# Patient Record
Sex: FEMALE | Race: WHITE | URBAN - METROPOLITAN AREA
[De-identification: names, ages, dates, MRNs, and addresses within clinical notes are randomized per-mention and may not be internally consistent; named-entity substitution may affect disease eponyms.]

---

## 2017-04-19 ENCOUNTER — OUTPATIENT (OUTPATIENT)
Dept: OUTPATIENT SERVICES | Facility: HOSPITAL | Age: 67
LOS: 1 days | Discharge: HOME | End: 2017-04-19

## 2017-06-27 DIAGNOSIS — Z12.31 ENCOUNTER FOR SCREENING MAMMOGRAM FOR MALIGNANT NEOPLASM OF BREAST: ICD-10-CM

## 2017-07-19 ENCOUNTER — OUTPATIENT (OUTPATIENT)
Dept: OUTPATIENT SERVICES | Facility: HOSPITAL | Age: 67
LOS: 1 days | Discharge: HOME | End: 2017-07-19

## 2017-07-19 DIAGNOSIS — R92.2 INCONCLUSIVE MAMMOGRAM: ICD-10-CM

## 2017-07-19 DIAGNOSIS — I48.91 UNSPECIFIED ATRIAL FIBRILLATION: ICD-10-CM

## 2017-07-20 ENCOUNTER — EMERGENCY (EMERGENCY)
Facility: HOSPITAL | Age: 67
LOS: 0 days | Discharge: HOME | End: 2017-07-20

## 2017-07-20 ENCOUNTER — INPATIENT (INPATIENT)
Facility: HOSPITAL | Age: 67
LOS: 0 days | Discharge: HOME | End: 2017-07-21
Attending: INTERNAL MEDICINE

## 2017-07-20 DIAGNOSIS — I48.91 UNSPECIFIED ATRIAL FIBRILLATION: ICD-10-CM

## 2017-07-20 DIAGNOSIS — E87.6 HYPOKALEMIA: ICD-10-CM

## 2017-07-20 DIAGNOSIS — R07.89 OTHER CHEST PAIN: ICD-10-CM

## 2017-07-20 DIAGNOSIS — K21.9 GASTRO-ESOPHAGEAL REFLUX DISEASE WITHOUT ESOPHAGITIS: ICD-10-CM

## 2017-07-20 DIAGNOSIS — I48.92 UNSPECIFIED ATRIAL FLUTTER: ICD-10-CM

## 2017-07-21 DIAGNOSIS — Z02.9 ENCOUNTER FOR ADMINISTRATIVE EXAMINATIONS, UNSPECIFIED: ICD-10-CM

## 2017-07-21 PROBLEM — Z00.00 ENCOUNTER FOR PREVENTIVE HEALTH EXAMINATION: Status: ACTIVE | Noted: 2017-07-21

## 2017-08-21 ENCOUNTER — APPOINTMENT (OUTPATIENT)
Dept: CARDIOLOGY | Facility: CLINIC | Age: 67
End: 2017-08-21

## 2017-09-01 ENCOUNTER — APPOINTMENT (OUTPATIENT)
Dept: CARDIOLOGY | Facility: CLINIC | Age: 67
End: 2017-09-01

## 2018-02-08 ENCOUNTER — APPOINTMENT (OUTPATIENT)
Dept: OBGYN | Facility: CLINIC | Age: 68
End: 2018-02-08
Payer: MEDICARE

## 2018-02-08 PROCEDURE — G0439: CPT

## 2018-02-08 PROCEDURE — G0101: CPT

## 2018-02-14 ENCOUNTER — APPOINTMENT (OUTPATIENT)
Dept: OBGYN | Facility: CLINIC | Age: 68
End: 2018-02-14
Payer: MEDICARE

## 2018-02-14 PROCEDURE — 76830 TRANSVAGINAL US NON-OB: CPT

## 2018-04-25 ENCOUNTER — OUTPATIENT (OUTPATIENT)
Dept: OUTPATIENT SERVICES | Facility: HOSPITAL | Age: 68
LOS: 1 days | Discharge: HOME | End: 2018-04-25

## 2018-04-25 DIAGNOSIS — R92.2 INCONCLUSIVE MAMMOGRAM: ICD-10-CM

## 2018-04-25 DIAGNOSIS — Z12.31 ENCOUNTER FOR SCREENING MAMMOGRAM FOR MALIGNANT NEOPLASM OF BREAST: ICD-10-CM

## 2019-06-24 ENCOUNTER — EMERGENCY (EMERGENCY)
Facility: HOSPITAL | Age: 69
LOS: 0 days | Discharge: HOME | End: 2019-06-25
Admitting: EMERGENCY MEDICINE
Payer: MEDICARE

## 2019-06-24 VITALS
OXYGEN SATURATION: 98 % | SYSTOLIC BLOOD PRESSURE: 122 MMHG | RESPIRATION RATE: 18 BRPM | HEART RATE: 83 BPM | DIASTOLIC BLOOD PRESSURE: 56 MMHG | TEMPERATURE: 97 F
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
## 2019-06-24 DIAGNOSIS — S20.461A INSECT BITE (NONVENOMOUS) OF RIGHT BACK WALL OF THORAX, INITIAL ENCOUNTER: ICD-10-CM
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
## 2019-06-24 DIAGNOSIS — Z88.8 ALLERGY STATUS TO OTHER DRUGS, MEDICAMENTS AND BIOLOGICAL SUBSTANCES STATUS: ICD-10-CM
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
## 2019-06-24 PROCEDURE — 99283 EMERGENCY DEPT VISIT LOW MDM: CPT

## 2019-06-24 NOTE — ED PROVIDER NOTE - NS ED ROS FT
Constitutional: no fever, chills, no recent weight loss, change in appetite or malaise  Cardiac: No chest pain, SOB or edema.  Respiratory: No cough or respiratory distress  GI: No nausea, vomiting, diarrhea or abdominal pain.  : No dysuria, frequency, urgency or hematuria  MS: no pain to back or extremities, no loss of ROM, no weakness  Neuro: No headache or weakness. No LOC.  Skin: + tick bite to back  Except as documented in the HPI, all other systems are negative.

## 2019-06-24 NOTE — ED PROVIDER NOTE - PHYSICAL EXAMINATION
CONSTITUTIONAL: Well-appearing; well-nourished; in no apparent distress.   NECK: Supple; non-tender; no cervical lymphadenopathy.   CARDIOVASCULAR: Normal S1, S2; no murmurs, rubs, or gallops.   RESPIRATORY: Normal chest excursion with respiration; breath sounds clear and equal bilaterally; no wheezes, rhonchi, or rales.  GI/: Normal bowel sounds; non-distended; non-tender; no palpable organomegaly.   MS: No evidence of trauma or deformity. Normal ROM in all four extremities; non-tender to palpation;   SKIN: + small tick noted to mid upper back. No rashes noted  NEURO/PSYCH: A & O x 4; grossly unremarkable. mood and manner are appropriate.

## 2019-06-24 NOTE — ED PROVIDER NOTE - OBJECTIVE STATEMENT
69 year old F c/o tick bote to back. Pt sts she noticed small tick on back today and daughter tried to pull it out but couldn't. STs was outside all weekend. Denies any other symptoms. No rashes, fever/chills, joint pain, fatigue, weakness, chest pain, sob, headache.

## 2019-06-25 RX ADMIN — Medication 200 MILLIGRAM(S): at 00:13

## 2019-06-26 ENCOUNTER — OUTPATIENT (OUTPATIENT)
Dept: OUTPATIENT SERVICES | Facility: HOSPITAL | Age: 69
LOS: 1 days | Discharge: HOME | End: 2019-06-26
Payer: MEDICARE

## 2019-06-26 DIAGNOSIS — R92.2 INCONCLUSIVE MAMMOGRAM: ICD-10-CM

## 2019-06-26 DIAGNOSIS — Z12.31 ENCOUNTER FOR SCREENING MAMMOGRAM FOR MALIGNANT NEOPLASM OF BREAST: ICD-10-CM

## 2019-06-26 PROCEDURE — 77063 BREAST TOMOSYNTHESIS BI: CPT | Mod: 26

## 2019-06-26 PROCEDURE — 76641 ULTRASOUND BREAST COMPLETE: CPT | Mod: 26,50

## 2019-06-26 PROCEDURE — 77067 SCR MAMMO BI INCL CAD: CPT | Mod: 26

## 2020-02-18 ENCOUNTER — APPOINTMENT (OUTPATIENT)
Dept: OBGYN | Facility: CLINIC | Age: 70
End: 2020-02-18
Payer: MEDICARE

## 2020-02-18 PROCEDURE — 76830 TRANSVAGINAL US NON-OB: CPT

## 2020-02-18 PROCEDURE — G0439: CPT

## 2020-02-18 PROCEDURE — G0101: CPT

## 2020-03-22 ENCOUNTER — INPATIENT (INPATIENT)
Facility: HOSPITAL | Age: 70
LOS: 24 days | Discharge: NURSING, REHAB OR LONG-TERM CARE | End: 2020-04-16
Attending: INTERNAL MEDICINE | Admitting: INTERNAL MEDICINE
Payer: MEDICARE

## 2020-03-22 ENCOUNTER — TRANSCRIPTION ENCOUNTER (OUTPATIENT)
Age: 70
End: 2020-03-22

## 2020-03-22 VITALS
WEIGHT: 145.06 LBS | HEART RATE: 83 BPM | SYSTOLIC BLOOD PRESSURE: 88 MMHG | OXYGEN SATURATION: 97 % | RESPIRATION RATE: 22 BRPM | DIASTOLIC BLOOD PRESSURE: 52 MMHG | TEMPERATURE: 98 F

## 2020-03-22 LAB
ALBUMIN SERPL ELPH-MCNC: 3.1 G/DL — LOW (ref 3.5–5.2)
ALP SERPL-CCNC: 45 U/L — SIGNIFICANT CHANGE UP (ref 30–115)
ALT FLD-CCNC: 39 U/L — SIGNIFICANT CHANGE UP (ref 0–41)
ANION GAP SERPL CALC-SCNC: 14 MMOL/L — SIGNIFICANT CHANGE UP (ref 7–14)
ANION GAP SERPL CALC-SCNC: 15 MMOL/L — HIGH (ref 7–14)
APPEARANCE UR: CLEAR — SIGNIFICANT CHANGE UP
APTT BLD: 38.2 SEC — SIGNIFICANT CHANGE UP (ref 27–39.2)
AST SERPL-CCNC: 94 U/L — HIGH (ref 0–41)
BASOPHILS # BLD AUTO: 0.01 K/UL — SIGNIFICANT CHANGE UP (ref 0–0.2)
BASOPHILS NFR BLD AUTO: 0.3 % — SIGNIFICANT CHANGE UP (ref 0–1)
BILIRUB SERPL-MCNC: 0.5 MG/DL — SIGNIFICANT CHANGE UP (ref 0.2–1.2)
BILIRUB UR-MCNC: NEGATIVE — SIGNIFICANT CHANGE UP
BUN SERPL-MCNC: 16 MG/DL — SIGNIFICANT CHANGE UP (ref 10–20)
BUN SERPL-MCNC: 16 MG/DL — SIGNIFICANT CHANGE UP (ref 10–20)
CALCIUM SERPL-MCNC: 7.1 MG/DL — LOW (ref 8.5–10.1)
CALCIUM SERPL-MCNC: 8.1 MG/DL — LOW (ref 8.5–10.1)
CHLORIDE SERPL-SCNC: 106 MMOL/L — SIGNIFICANT CHANGE UP (ref 98–110)
CHLORIDE SERPL-SCNC: 95 MMOL/L — LOW (ref 98–110)
CO2 SERPL-SCNC: 17 MMOL/L — SIGNIFICANT CHANGE UP (ref 17–32)
CO2 SERPL-SCNC: 18 MMOL/L — SIGNIFICANT CHANGE UP (ref 17–32)
COLOR SPEC: SIGNIFICANT CHANGE UP
CREAT SERPL-MCNC: 0.8 MG/DL — SIGNIFICANT CHANGE UP (ref 0.7–1.5)
CREAT SERPL-MCNC: 1 MG/DL — SIGNIFICANT CHANGE UP (ref 0.7–1.5)
D DIMER BLD IA.RAPID-MCNC: 312 NG/ML DDU — HIGH (ref 0–230)
DIFF PNL FLD: NEGATIVE — SIGNIFICANT CHANGE UP
EOSINOPHIL # BLD AUTO: 0 K/UL — SIGNIFICANT CHANGE UP (ref 0–0.7)
EOSINOPHIL NFR BLD AUTO: 0 % — SIGNIFICANT CHANGE UP (ref 0–8)
ERYTHROCYTE [SEDIMENTATION RATE] IN BLOOD: 42 MM/HR — HIGH (ref 0–20)
FIBRINOGEN PPP-MCNC: >700 MG/DL — HIGH (ref 204.4–570.6)
FLU A RESULT: NEGATIVE — SIGNIFICANT CHANGE UP
FLU A RESULT: NEGATIVE — SIGNIFICANT CHANGE UP
FLUAV AG NPH QL: NEGATIVE — SIGNIFICANT CHANGE UP
FLUBV AG NPH QL: NEGATIVE — SIGNIFICANT CHANGE UP
GLUCOSE SERPL-MCNC: 116 MG/DL — HIGH (ref 70–99)
GLUCOSE SERPL-MCNC: 143 MG/DL — HIGH (ref 70–99)
GLUCOSE UR QL: NEGATIVE — SIGNIFICANT CHANGE UP
HCT VFR BLD CALC: 40.4 % — SIGNIFICANT CHANGE UP (ref 37–47)
HGB BLD-MCNC: 14.4 G/DL — SIGNIFICANT CHANGE UP (ref 12–16)
IMM GRANULOCYTES NFR BLD AUTO: 0.3 % — SIGNIFICANT CHANGE UP (ref 0.1–0.3)
INR BLD: 1.15 RATIO — SIGNIFICANT CHANGE UP (ref 0.65–1.3)
KETONES UR-MCNC: NEGATIVE — SIGNIFICANT CHANGE UP
LACTATE SERPL-SCNC: 1.8 MMOL/L — SIGNIFICANT CHANGE UP (ref 0.7–2)
LDH SERPL L TO P-CCNC: 562 — HIGH (ref 50–242)
LEUKOCYTE ESTERASE UR-ACNC: NEGATIVE — SIGNIFICANT CHANGE UP
LYMPHOCYTES # BLD AUTO: 0.38 K/UL — LOW (ref 1.2–3.4)
LYMPHOCYTES # BLD AUTO: 13.1 % — LOW (ref 20.5–51.1)
MCHC RBC-ENTMCNC: 30.4 PG — SIGNIFICANT CHANGE UP (ref 27–31)
MCHC RBC-ENTMCNC: 35.6 G/DL — SIGNIFICANT CHANGE UP (ref 32–37)
MCV RBC AUTO: 85.2 FL — SIGNIFICANT CHANGE UP (ref 81–99)
MONOCYTES # BLD AUTO: 0.29 K/UL — SIGNIFICANT CHANGE UP (ref 0.1–0.6)
MONOCYTES NFR BLD AUTO: 10 % — HIGH (ref 1.7–9.3)
NEUTROPHILS # BLD AUTO: 2.2 K/UL — SIGNIFICANT CHANGE UP (ref 1.4–6.5)
NEUTROPHILS NFR BLD AUTO: 76.3 % — HIGH (ref 42.2–75.2)
NITRITE UR-MCNC: NEGATIVE — SIGNIFICANT CHANGE UP
NRBC # BLD: 0 /100 WBCS — SIGNIFICANT CHANGE UP (ref 0–0)
PH UR: 6.5 — SIGNIFICANT CHANGE UP (ref 5–8)
PLATELET # BLD AUTO: 170 K/UL — SIGNIFICANT CHANGE UP (ref 130–400)
POTASSIUM SERPL-MCNC: 3.7 MMOL/L — SIGNIFICANT CHANGE UP (ref 3.5–5)
POTASSIUM SERPL-MCNC: 6.5 MMOL/L — CRITICAL HIGH (ref 3.5–5)
POTASSIUM SERPL-SCNC: 3.7 MMOL/L — SIGNIFICANT CHANGE UP (ref 3.5–5)
POTASSIUM SERPL-SCNC: 6.5 MMOL/L — CRITICAL HIGH (ref 3.5–5)
PROT SERPL-MCNC: 6.5 G/DL — SIGNIFICANT CHANGE UP (ref 6–8)
PROT UR-MCNC: SIGNIFICANT CHANGE UP
PROTHROM AB SERPL-ACNC: 13.2 SEC — HIGH (ref 9.95–12.87)
RBC # BLD: 4.74 M/UL — SIGNIFICANT CHANGE UP (ref 4.2–5.4)
RBC # FLD: 13.7 % — SIGNIFICANT CHANGE UP (ref 11.5–14.5)
RSV RESULT: NEGATIVE — SIGNIFICANT CHANGE UP
RSV RNA RESP QL NAA+PROBE: NEGATIVE — SIGNIFICANT CHANGE UP
SODIUM SERPL-SCNC: 128 MMOL/L — LOW (ref 135–146)
SODIUM SERPL-SCNC: 137 MMOL/L — SIGNIFICANT CHANGE UP (ref 135–146)
SP GR SPEC: 1.01 — LOW (ref 1.01–1.02)
UROBILINOGEN FLD QL: SIGNIFICANT CHANGE UP
WBC # BLD: 2.89 K/UL — LOW (ref 4.8–10.8)
WBC # FLD AUTO: 2.89 K/UL — LOW (ref 4.8–10.8)

## 2020-03-22 PROCEDURE — 93010 ELECTROCARDIOGRAM REPORT: CPT

## 2020-03-22 PROCEDURE — 99223 1ST HOSP IP/OBS HIGH 75: CPT | Mod: AI,GC

## 2020-03-22 PROCEDURE — 71045 X-RAY EXAM CHEST 1 VIEW: CPT | Mod: 26

## 2020-03-22 PROCEDURE — 99285 EMERGENCY DEPT VISIT HI MDM: CPT

## 2020-03-22 RX ORDER — ACETAMINOPHEN 500 MG
650 TABLET ORAL ONCE
Refills: 0 | Status: DISCONTINUED | OUTPATIENT
Start: 2020-03-22 | End: 2020-03-22

## 2020-03-22 RX ORDER — APIXABAN 2.5 MG/1
1 TABLET, FILM COATED ORAL
Qty: 0 | Refills: 0 | DISCHARGE

## 2020-03-22 RX ORDER — CHLORHEXIDINE GLUCONATE 213 G/1000ML
1 SOLUTION TOPICAL
Refills: 0 | Status: DISCONTINUED | OUTPATIENT
Start: 2020-03-22 | End: 2020-04-05

## 2020-03-22 RX ORDER — METOPROLOL TARTRATE 50 MG
25 TABLET ORAL ONCE
Refills: 0 | Status: COMPLETED | OUTPATIENT
Start: 2020-03-22 | End: 2020-03-22

## 2020-03-22 RX ORDER — APIXABAN 2.5 MG/1
2.5 TABLET, FILM COATED ORAL EVERY 12 HOURS
Refills: 0 | Status: DISCONTINUED | OUTPATIENT
Start: 2020-03-22 | End: 2020-03-24

## 2020-03-22 RX ORDER — METOPROLOL TARTRATE 50 MG
1 TABLET ORAL
Qty: 0 | Refills: 0 | DISCHARGE

## 2020-03-22 RX ORDER — METOPROLOL TARTRATE 50 MG
25 TABLET ORAL
Refills: 0 | Status: DISCONTINUED | OUTPATIENT
Start: 2020-03-22 | End: 2020-03-24

## 2020-03-22 RX ORDER — APIXABAN 2.5 MG/1
2.5 TABLET, FILM COATED ORAL ONCE
Refills: 0 | Status: COMPLETED | OUTPATIENT
Start: 2020-03-22 | End: 2020-03-22

## 2020-03-22 RX ORDER — METOPROLOL TARTRATE 50 MG
50 TABLET ORAL ONCE
Refills: 0 | Status: DISCONTINUED | OUTPATIENT
Start: 2020-03-22 | End: 2020-03-22

## 2020-03-22 RX ORDER — SODIUM CHLORIDE 9 MG/ML
2000 INJECTION, SOLUTION INTRAVENOUS ONCE
Refills: 0 | Status: COMPLETED | OUTPATIENT
Start: 2020-03-22 | End: 2020-03-22

## 2020-03-22 RX ADMIN — Medication 25 MILLIGRAM(S): at 16:29

## 2020-03-22 RX ADMIN — APIXABAN 2.5 MILLIGRAM(S): 2.5 TABLET, FILM COATED ORAL at 17:22

## 2020-03-22 RX ADMIN — SODIUM CHLORIDE 2000 MILLILITER(S): 9 INJECTION, SOLUTION INTRAVENOUS at 13:11

## 2020-03-22 NOTE — ED PROVIDER NOTE - CARE PLAN
Principal Discharge DX:	Sepsis  Secondary Diagnosis:	Viral pneumonia  Secondary Diagnosis:	Atrial fibrillation with rapid ventricular response

## 2020-03-22 NOTE — H&P ADULT - ATTENDING COMMENTS
I saw and evaluated the patient. I have reviewed and agree with the findings and plan of care as documented above in the resident’s note (unless indicated differently below). Any necessary changes were made in the body of the text.    71 yo F pt p/w weakness, anorexia, coughing (mostly non-productive), mild SOB, myalgias, fever (Tmax of 101 per pt), chills and intermittent nausea (but no vomiting). Symptoms began about one week ago. Pt can’t think of any sick contacts and denies any recent travel history. Symptoms progressive since onset w/ no alleviating factors. ROS negative for vomiting, diarrhea, dysuria, arthralgias and skin changes. Says that nothing like this has happened to her before.    ROS:  Constitutional: +fever; +chills; +generalized weakness  Eyes: no conjunctivitis or itching  ENT: no dysphagia or odynophagia  CVS: no LE swelling or PND  Resp: +SOB; +coughing   GI: +nausea; no vomiting; no diarrhea  : no dysuria or hematuria  MSK: +myalgias; no arthralgias  Skin: no skin rashes  All other systems reviewed and are negative    PMHx: A-fib (on metoprolol and apixaban)  SurHx: cholecystectomy  FHx: denies  SocHx: denies tobacco, alcohol and drug use    Exam:  Vitals: BP = 102/64; P = 99; T = 98.2; RR = 16  General: appears stated age; cooperative  Eyes: anicteric sclerae; moist conjunctiva; no lid lag; PERRLA  HENT: AT/NC; clear oropharynx w/ moist mucous membranes  Neck: supple w/ FROM; trachea midline; no thyromegaly  Lungs: lungs w/ crackles over the right middle and lower lung fields; non-labored breathing  CVS: tachycardic w/ irregular rhythm; S1 and S2 w/o MRG  Abd: BS+; soft; non tender to palpation; no masses or HSM  Extremities: LE’s non-edematous; peripheral pulses 2+ b/l  Skin: skin turgor, texture normal; no rashes, nodules or ulcers  Psych: anxious; alert; oriented to person, place, time and situation    Labs: ESR = 42; WBC = 2.89; fibrinogen > 700; d-dimer = 312; PT = 13.2; Ca = 7.1; alb = 3.1  CXR: reticular interstitial opacities: b/l lower and mid lung fields  EKG: A-fib RVR    Assessment:  (1) Weakness, fever, SOB and coughing - concerning for viral PNA (r/o covid-19 infection)  (2) A-fib w/ RVR (uncontrolled likely 2/2 ongoing infection - [id number] of 2 on apixaban)    Plan:  (1) Supportive care: anti-pyretics, anti-emetics, analgesics and anti-tussives to be given PRN  (2) Isolation: airborne/contact/droplet  (3) F/u CRP, CPK, procalcitonin, lipid panel and ferritin; trend CBC and LFT’s (monitor H score)  (4) Hold off on hydroxychloroquine – pt is not hypoxic  (5) Avoid high flow and NIPPV - aerosol generating   (6) Avoid NSAIDs and systemic glucocorticoids  (7) IVF’s – gentle hydration  (8) Advance diet as tolerated  (9) C/w meds as ordered    Code status: full code

## 2020-03-22 NOTE — H&P ADULT - HISTORY OF PRESENT ILLNESS
71 y/o female with pmh of a-fib, c/o fever, non-productive cough, body aches and decreased appetite x 7 days. No known sick contacts. No diarrhea. No ABD pain. No H/A, no neck pain. No dysuria/frequency/urgency. Today with dyspnea at rest. No hemoptysis. 71 y/o female with pmh of a-fib, c/o fever, non-productive cough, body aches and decreased appetite x 7 days. No known sick contacts. No diarrhea. No ABD pain. No H/A, no neck pain. No dysuria/frequency/urgency. Today with dyspnea at rest. No hemoptysis.    # Fever and non productive cough likely viral etiology at this time cannot exclude covid 19  - flu a/b, rsv negative  - covid 19 pending  - wbc low, will send ldh, d dimer, crp, esr, ferritin  - common labs sent  - cxr shows findings bilaterally    # Afib  - metoprolol 25 bid   - apixiban 2.5mg bid    Activity: as tolerated  diet: regular   dvt ppx: heparin 5k bid  gi ppx: not indicated  full code  dispo: from home

## 2020-03-22 NOTE — ED ADULT NURSE NOTE - OBJECTIVE STATEMENT
70 Year old female complaining of weakness, fatigue, and poor appetite. 70 Year old female complaining of weakness, fatigue, and poor appetite. Patient received tylenol 3 tablets 325mg for fever reduction P[place] at [facility] [place].

## 2020-03-22 NOTE — ED PROVIDER NOTE - OBJECTIVE STATEMENT
69 y/o female with hx of a-fib, c/o fever, non-productive cough, body aches and decreased appetite x 7 days. No known sick contacts. No diarrhea. No ABD pain. No H/A, no neck pain. No dysuria/frequency/urgency. Today with dyspnea at rest. No hemoptysis.

## 2020-03-22 NOTE — ED PROVIDER NOTE - PHYSICAL EXAMINATION
The patient was referred by the dermatology office in [place] to remove cyst from the forehead and from the left cheek.    I examined the patient.  The patient has a less than 2 mm sized very superficial epidermal cyst of the forehead.  I do not see any cyst of the left cheek.  The patient has multiple blackheads, multiple whiteheads, has some active acne.    I explained to the patient that I do not recommend removal of that small very superficial epidermal cyst.  Instead, I think the patient should go back to the general dermatology department and potentially have acne treatment instituted.    As the patient was expecting a procedure which is not currently indicated there will be no charge for today's office visit. Jeyson La MD    
O/E: Constitutional: Non-toxic in appearance. Eyes: Dry mucous membranes. No pallor, no jaundice. Neck: Neck supple, no meningeal signs. Respiratory: Lungs with coarse breath sounds b/l. No accessory muscle use. Cardio: S1 S2 tachycardic, irregular, no murmur. ABD: ABD non-distended, soft, no tenderness, no guarding, no rebound. Extremities: No pedal edema, no calf tenderness. Skin: No skin rash. Neuro: No neurologic deficits.   Imp: Sepsis, Ddx includes Covid, pneumonia, flu.  A/P: IV fluids, CXR, labs. will likely require admission.

## 2020-03-22 NOTE — ED ADULT TRIAGE NOTE - CHIEF COMPLAINT QUOTE
BIBA from urgent care for fever, cough, shortness of breath, body aches and diarrhea since Monday   denies sick contacts at home, denies travel  speaking in full sentences in triage

## 2020-03-22 NOTE — ED PROVIDER NOTE - CLINICAL SUMMARY MEDICAL DECISION MAKING FREE TEXT BOX
Labs reviewed. Leukopenia with lymphopenia. CXR with lower lobe opacities consistent with Covid. BP improved after IV fluids. 120/70. Remains tachycardic with a-fib. Will re-start metoprolol PO. Requires admission. Labs reviewed. Leukopenia with lymphopenia. CXR with lower lobe opacities consistent with Covid. BP improved after IV fluids. 120/70. Lactate 1.8. Remains tachycardic with a-fib. Will re-start metoprolol PO. Requires admission.

## 2020-03-23 ENCOUNTER — TRANSCRIPTION ENCOUNTER (OUTPATIENT)
Age: 70
End: 2020-03-23

## 2020-03-23 LAB
ALBUMIN SERPL ELPH-MCNC: 3 G/DL — LOW (ref 3.5–5.2)
ALP SERPL-CCNC: 57 U/L — SIGNIFICANT CHANGE UP (ref 30–115)
ALT FLD-CCNC: 35 U/L — SIGNIFICANT CHANGE UP (ref 0–41)
ANION GAP SERPL CALC-SCNC: 14 MMOL/L — SIGNIFICANT CHANGE UP (ref 7–14)
AST SERPL-CCNC: 51 U/L — HIGH (ref 0–41)
BASOPHILS # BLD AUTO: 0 K/UL — SIGNIFICANT CHANGE UP (ref 0–0.2)
BASOPHILS NFR BLD AUTO: 0 % — SIGNIFICANT CHANGE UP (ref 0–1)
BILIRUB SERPL-MCNC: 0.4 MG/DL — SIGNIFICANT CHANGE UP (ref 0.2–1.2)
BUN SERPL-MCNC: 14 MG/DL — SIGNIFICANT CHANGE UP (ref 10–20)
CALCIUM SERPL-MCNC: 7.7 MG/DL — LOW (ref 8.4–10.5)
CALCIUM SERPL-MCNC: 7.8 MG/DL — LOW (ref 8.5–10.1)
CHLORIDE SERPL-SCNC: 104 MMOL/L — SIGNIFICANT CHANGE UP (ref 98–110)
CO2 SERPL-SCNC: 22 MMOL/L — SIGNIFICANT CHANGE UP (ref 17–32)
CREAT SERPL-MCNC: 0.7 MG/DL — SIGNIFICANT CHANGE UP (ref 0.7–1.5)
CRP SERPL-MCNC: 3.3 MG/DL — HIGH (ref 0–0.4)
EOSINOPHIL # BLD AUTO: 0 K/UL — SIGNIFICANT CHANGE UP (ref 0–0.7)
EOSINOPHIL NFR BLD AUTO: 0 % — SIGNIFICANT CHANGE UP (ref 0–8)
FERRITIN SERPL-MCNC: 762 NG/ML — HIGH (ref 15–150)
GLUCOSE SERPL-MCNC: 100 MG/DL — HIGH (ref 70–99)
HCT VFR BLD CALC: 38.4 % — SIGNIFICANT CHANGE UP (ref 37–47)
HGB BLD-MCNC: 13.1 G/DL — SIGNIFICANT CHANGE UP (ref 12–16)
IMM GRANULOCYTES NFR BLD AUTO: 0.7 % — HIGH (ref 0.1–0.3)
LYMPHOCYTES # BLD AUTO: 0.45 K/UL — LOW (ref 1.2–3.4)
LYMPHOCYTES # BLD AUTO: 16.9 % — LOW (ref 20.5–51.1)
MAGNESIUM SERPL-MCNC: 2 MG/DL — SIGNIFICANT CHANGE UP (ref 1.8–2.4)
MCHC RBC-ENTMCNC: 29.8 PG — SIGNIFICANT CHANGE UP (ref 27–31)
MCHC RBC-ENTMCNC: 34.1 G/DL — SIGNIFICANT CHANGE UP (ref 32–37)
MCV RBC AUTO: 87.5 FL — SIGNIFICANT CHANGE UP (ref 81–99)
MONOCYTES # BLD AUTO: 0.18 K/UL — SIGNIFICANT CHANGE UP (ref 0.1–0.6)
MONOCYTES NFR BLD AUTO: 6.7 % — SIGNIFICANT CHANGE UP (ref 1.7–9.3)
NEUTROPHILS # BLD AUTO: 2.02 K/UL — SIGNIFICANT CHANGE UP (ref 1.4–6.5)
NEUTROPHILS NFR BLD AUTO: 75.7 % — HIGH (ref 42.2–75.2)
NRBC # BLD: 0 /100 WBCS — SIGNIFICANT CHANGE UP (ref 0–0)
PHOSPHATE SERPL-MCNC: 2.9 MG/DL — SIGNIFICANT CHANGE UP (ref 2.1–4.9)
PLATELET # BLD AUTO: 167 K/UL — SIGNIFICANT CHANGE UP (ref 130–400)
POTASSIUM SERPL-MCNC: 3.3 MMOL/L — LOW (ref 3.5–5)
POTASSIUM SERPL-SCNC: 3.3 MMOL/L — LOW (ref 3.5–5)
PROCALCITONIN SERPL-MCNC: 0.09 NG/ML — SIGNIFICANT CHANGE UP (ref 0.02–0.1)
PROT SERPL-MCNC: 5.2 G/DL — LOW (ref 6–8)
PTH-INTACT FLD-MCNC: 38 PG/ML — SIGNIFICANT CHANGE UP (ref 15–65)
RBC # BLD: 4.39 M/UL — SIGNIFICANT CHANGE UP (ref 4.2–5.4)
RBC # FLD: 14.1 % — SIGNIFICANT CHANGE UP (ref 11.5–14.5)
SARS-COV-2 RNA SPEC QL NAA+PROBE: DETECTED
SODIUM SERPL-SCNC: 140 MMOL/L — SIGNIFICANT CHANGE UP (ref 135–146)
WBC # BLD: 2.67 K/UL — LOW (ref 4.8–10.8)
WBC # FLD AUTO: 2.67 K/UL — LOW (ref 4.8–10.8)

## 2020-03-23 PROCEDURE — 99223 1ST HOSP IP/OBS HIGH 75: CPT

## 2020-03-23 PROCEDURE — 71045 X-RAY EXAM CHEST 1 VIEW: CPT | Mod: 26

## 2020-03-23 PROCEDURE — 93010 ELECTROCARDIOGRAM REPORT: CPT

## 2020-03-23 RX ORDER — HYDROXYCHLOROQUINE SULFATE 200 MG
400 TABLET ORAL EVERY 12 HOURS
Refills: 0 | Status: COMPLETED | OUTPATIENT
Start: 2020-03-23 | End: 2020-03-24

## 2020-03-23 RX ORDER — POTASSIUM CHLORIDE 20 MEQ
40 PACKET (EA) ORAL ONCE
Refills: 0 | Status: COMPLETED | OUTPATIENT
Start: 2020-03-23 | End: 2020-03-23

## 2020-03-23 RX ORDER — CEFEPIME 1 G/1
2000 INJECTION, POWDER, FOR SOLUTION INTRAMUSCULAR; INTRAVENOUS ONCE
Refills: 0 | Status: COMPLETED | OUTPATIENT
Start: 2020-03-23 | End: 2020-03-23

## 2020-03-23 RX ORDER — ACETAMINOPHEN 500 MG
2 TABLET ORAL
Qty: 30 | Refills: 0
Start: 2020-03-23

## 2020-03-23 RX ORDER — SODIUM CHLORIDE 9 MG/ML
1000 INJECTION INTRAMUSCULAR; INTRAVENOUS; SUBCUTANEOUS
Refills: 0 | Status: DISCONTINUED | OUTPATIENT
Start: 2020-03-23 | End: 2020-03-23

## 2020-03-23 RX ORDER — HYDROXYCHLOROQUINE SULFATE 200 MG
200 TABLET ORAL EVERY 12 HOURS
Refills: 0 | Status: COMPLETED | OUTPATIENT
Start: 2020-03-24 | End: 2020-03-28

## 2020-03-23 RX ORDER — CEFEPIME 1 G/1
2000 INJECTION, POWDER, FOR SOLUTION INTRAMUSCULAR; INTRAVENOUS ONCE
Refills: 0 | Status: DISCONTINUED | OUTPATIENT
Start: 2020-03-23 | End: 2020-03-23

## 2020-03-23 RX ORDER — ONDANSETRON 8 MG/1
4 TABLET, FILM COATED ORAL ONCE
Refills: 0 | Status: COMPLETED | OUTPATIENT
Start: 2020-03-23 | End: 2020-03-23

## 2020-03-23 RX ORDER — ACETAMINOPHEN 500 MG
650 TABLET ORAL EVERY 6 HOURS
Refills: 0 | Status: DISCONTINUED | OUTPATIENT
Start: 2020-03-23 | End: 2020-03-24

## 2020-03-23 RX ORDER — HYDROXYCHLOROQUINE SULFATE 200 MG
TABLET ORAL
Refills: 0 | Status: COMPLETED | OUTPATIENT
Start: 2020-03-23 | End: 2020-03-28

## 2020-03-23 RX ADMIN — Medication 650 MILLIGRAM(S): at 07:43

## 2020-03-23 RX ADMIN — APIXABAN 2.5 MILLIGRAM(S): 2.5 TABLET, FILM COATED ORAL at 17:55

## 2020-03-23 RX ADMIN — Medication 40 MILLIEQUIVALENT(S): at 17:56

## 2020-03-23 RX ADMIN — Medication 650 MILLIGRAM(S): at 17:55

## 2020-03-23 RX ADMIN — ONDANSETRON 4 MILLIGRAM(S): 8 TABLET, FILM COATED ORAL at 17:55

## 2020-03-23 RX ADMIN — APIXABAN 2.5 MILLIGRAM(S): 2.5 TABLET, FILM COATED ORAL at 05:39

## 2020-03-23 RX ADMIN — CEFEPIME 100 MILLIGRAM(S): 1 INJECTION, POWDER, FOR SOLUTION INTRAMUSCULAR; INTRAVENOUS at 22:24

## 2020-03-23 RX ADMIN — Medication 400 MILLIGRAM(S): at 22:22

## 2020-03-23 RX ADMIN — Medication 25 MILLIGRAM(S): at 05:39

## 2020-03-23 RX ADMIN — Medication 25 MILLIGRAM(S): at 17:55

## 2020-03-23 RX ADMIN — SODIUM CHLORIDE 50 MILLILITER(S): 9 INJECTION INTRAMUSCULAR; INTRAVENOUS; SUBCUTANEOUS at 06:10

## 2020-03-23 RX ADMIN — Medication 650 MILLIGRAM(S): at 06:10

## 2020-03-23 NOTE — DISCHARGE NOTE PROVIDER - HOSPITAL COURSE
69 y/o female with pmh of a-fib, c/o fever, non-productive cough, body aches and decreased appetite x 7 days. No known sick contacts. No diarrhea. No ABD pain. +Dyspnea at rest         # Fever and non productive cough likely viral etiology at this time cannot exclude COVID-19    - Flu A/B/RSV negative, COVID-19 pending     - Lymphopenic and Leukopenic, mild elevation in AST     - CXR showed bilateral reticular infiltrates, read as viral PNA    - Treated with NS at 75mL/hr     - Inflammatory panel noted    - Given instruction to self-quarantine for 14 days or until ED calls patient back with negative result         # Chronic Afib with RVR     - Now rate controlled     - metoprolol 25 bid     - apixaban 2.5mg bid            DVT ppx: Heparin 5000U SubQ twice daily     Activity: as tolerated    Diet: Regular     full code    dispo: from home 69 y/o female with pmh of a-fib, c/o fever, non-productive cough, body aches and decreased appetite x 7 days. No known sick contacts. No diarrhea. No ABD pain. Patient was found to be COVID +. She required intubated on 3/24/2020 for hypoxic respiratory failure. Extubated 4/5.         # Acute hypoxic respiratory failure due to COVID-19     - required intubation on 3/24, extubated on 4/5. Currently on 2L NC satting 97%.    - Completed courses of Unasyn for possible aspiration PNA, Plaquenil, solumedrol, and tocilizumab    - Dispo to [facility], pt requires intensive rehabilitation      - Inflammatory markers have been stable        # Chronic A.fib with high rate A.fib, controlled     - Oral amiodarone and metoprolol 25 BID     - Eliquis 2.5 twice daily         # HTN     - continue metoprolol, Norvasc         Diet: dysphagia diet     DVT prophylaxis: Eliquis     Ambulation: bed to chair 71 y/o female with pmh of a-fib, c/o fever, non-productive cough, body aches and decreased appetite x 7 days. No known sick contacts. No diarrhea. No ABD pain. Patient was found to be COVID +. She required intubated on 3/24/2020 for hypoxic respiratory failure. Extubated 4/5.         # Acute hypoxic respiratory failure due to COVID-19     - required intubation on 3/24, extubated on 4/5. Currently on 2L NC satting 94%. Comfortable, but very anxious     - Completed courses of Unasyn for possible aspiration PNA, Plaquenil, solumedrol, and tocilizumab    - Dispo to [facility], pt requires intensive rehabilitation      - Inflammatory markers have been stable        # Chronic A.fib with high rate A.fib, controlled     - Oral amiodarone and metoprolol 25 BID     - Eliquis 2.5 twice daily         # HTN     - continue metoprolol, Norvasc         # Anxiety    - Xanax 0.25mg twice daily standing         Diet: dysphagia diet     DVT prophylaxis: Eliquis     Ambulation: bed to chair Patient is a 70y old  Female who presents with a chief complaint of covid r/o given fevers, non productive cough (15 Apr 2020 10:08)    69 y/o female with hx of a-fib, presented to the ED on 3/22 with complaints of fever, non-productive cough, body aches and decreased appetite. No known sick contacts. No diarrhea. No ABD pain. No H/A, no neck pain. No dysuria/frequency/urgency.         Patient was found to be COVID +. She required intubation on 3/24/2020 for hypoxic respiratory failure. Extubated 4/5.     Completed courses of Unasyn for possible aspiration PNA, Plaquenil, solumedrol, and tocilizumab.        Patient examined bedside and reports doing okay this morning. She was saturating at 93% on 1 NC. However, upon moving OOB to chair she desaturated to 80%. Oxygen was increased to 5L and patient was clinically stable SPO2 91%. Patient becomes anxious easily regarding her clinical state. Patient is hospital Day 25. Stable on 2.5 L SPO2 90%            # Acute hypoxic respiratory failure due to COVID-19     - required intubation on 3/24, extubated on 4/5. Comfortable, but very anxious     - Completed courses of Unasyn for possible aspiration PNA, Plaquenil, solumedrol, and tocilizumab    - Dispo to [facility], pt requires intensive rehabilitation      - Inflammatory markers have been stable

## 2020-03-23 NOTE — PROGRESS NOTE ADULT - SUBJECTIVE AND OBJECTIVE BOX
LEAH CLIFTON 70y Female      Patient is a 70y old  Female who presents with a chief complaint of covid r/o given fevers, non productive cough (22 Mar 2020 17:47)        INTERVAL HPI/OVERNIGHT EVENTS: No acute events overnight. Patient was seen and evaluated at the bedside. The patient denies pain. 2L NC oxygen, spiked temp to 101.4 overnight. Patient denies fever/chills, chest pain, shortness of breath, abdominal pain, headaches, nausea/vomiting, and diarrhea/constipation.      PHYSICAL EXAM:  GENERAL: NAD  HEAD:  Normocephalic  EYES:  conjunctiva and sclera clear  ENMT: Moist mucous membranes  NECK: Supple  NERVOUS SYSTEM:  Alert, awake  CHEST/LUNG: Good air exchange bilaterally, no wheeze  HEART: Regular rate and rhythm        Vital Signs Last 24 Hrs  T(C): 37.1 (23 Mar 2020 06:48), Max: 38.6 (23 Mar 2020 05:41)  T(F): 98.8 (23 Mar 2020 06:48), Max: 101.4 (23 Mar 2020 05:41)  HR: 86 (23 Mar 2020 05:41) (83 - 161)  BP: 109/54 (23 Mar 2020 05:41) (88/52 - 127/64)  BP(mean): --  RR: 18 (23 Mar 2020 05:41) (16 - 22)  SpO2: 95% (22 Mar 2020 19:01) (93% - 97%)      Consultant(s) Notes Reviewed:  [X] YES  [ ] NO  Care Discussed with Consultants/Other Providers [X] YES  [ ] NO  Imaging Personally Reviewed:  [X] YES  [ ] NO      LABS:                        13.1   2.67  )-----------( 167      ( 23 Mar 2020 08:12 )             38.4     03-23    140  |  104  |  14  ----------------------------<  100<H>  3.3<L>   |  22  |  0.7    Ca    7.8<L>      23 Mar 2020 08:12  Phos  2.9     03-  Mg     2.0     03-23    TPro  5.2<L>  /  Alb  3.0<L>  /  TBili  0.4  /  DBili  x   /  AST  51<H>  /  ALT  35  /  AlkPhos  57  03-23    PT/INR - ( 22 Mar 2020 13:15 )   PT: 13.20 sec;   INR: 1.15 ratio         PTT - ( 22 Mar 2020 13:15 )  PTT:38.2 sec  Urinalysis Basic - ( 22 Mar 2020 17:29 )    Color: Light Yellow / Appearance: Clear / S.007 / pH: x  Gluc: x / Ketone: Negative  / Bili: Negative / Urobili: <2 mg/dL   Blood: x / Protein: Trace / Nitrite: Negative   Leuk Esterase: Negative / RBC: x / WBC x   Sq Epi: x / Non Sq Epi: x / Bacteria: x        CAPILLARY BLOOD GLUCOSE

## 2020-03-23 NOTE — PROGRESS NOTE ADULT - ASSESSMENT
69 y/o female with pmh of a-fib, c/o fever, non-productive cough, body aches and decreased appetite x 7 days. No known sick contacts. No diarrhea. No ABD pain. +Dyspnea at rest     # Fever and non productive cough likely viral etiology at this time cannot exclude COVID-19  - Flu A/B/RSV negative, COVID-19 pending   - Lymphopenic and Leukopenic, mild elevation in AST   - CXR showed bilateral reticular infiltrates, read as viral PNA in proper setting  - May need HCQ, get daily EKGs if on this medication and check baseline EKG QTc (453 ms)  - Continue NS at 75mL/hr     # Chronic Afib with RVR   - Now rate controlled   - metoprolol 25 bid   - apixaban 2.5mg bid      DVT ppx: Heparin 5000U SubQ twice daily   Activity: as tolerated  Diet: Regular   full code  dispo: from home

## 2020-03-23 NOTE — DISCHARGE NOTE PROVIDER - NSDCCPCAREPLAN_GEN_ALL_CORE_FT
PRINCIPAL DISCHARGE DIAGNOSIS  Diagnosis: Sepsis  Assessment and Plan of Treatment: You were admitted due to fever and cough. COVID-19 was suspected, so you were swabbed and your results are PENDING. Please self-quarantine at home for 14 days or until the ER/Department of Health contacts you with further instruction. You may use ice packs and Tylenol for fevers. If your symptoms worsen or persist, please return to the ED. Follow-up with your PCP in 2-3 weeks.      SECONDARY DISCHARGE DIAGNOSES  Diagnosis: Atrial fibrillation with rapid ventricular response  Assessment and Plan of Treatment: Now rate controlled. Continue to take your Lopressor twice daily. See your cardiologist as an outpatient in 2-3 weeks. PRINCIPAL DISCHARGE DIAGNOSIS  Diagnosis: COVID-19  Assessment and Plan of Treatment: You were admitted for acute hypoxic respiratory failure secondary to infection with SARS-CoV-2 causing severe COVID-19. You were intubated and received therapy for this virus including tocilizumab. You recovered and were extubated (tube was removed) and eventually titrated to room air. Please follow-up with your PCP in 2-3 weeks.      SECONDARY DISCHARGE DIAGNOSES  Diagnosis: Atrial fibrillation with rapid ventricular response  Assessment and Plan of Treatment: Now rate controlled. Continue to take your Lopressor twice daily. See your cardiologist as an outpatient in 2-3 weeks.

## 2020-03-23 NOTE — DISCHARGE NOTE PROVIDER - CARE PROVIDER_API CALL
Steven Berry)  Internal Medicine  [address], [address]  [place] [address]  Phone: [phone]  Fax: [phone]  Follow Up Time: 2 weeks

## 2020-03-23 NOTE — DISCHARGE NOTE PROVIDER - NSDCMRMEDTOKEN_GEN_ALL_CORE_FT
acetaminophen 325 mg oral tablet: 2 tab(s) orally every 6 hours, As needed, Temp greater or equal to 38C (100.4F)  apixaban 2.5 mg oral tablet: 1 tab(s) orally 2 times a day  metoprolol tartrate 25 mg oral tablet: 1 tab(s) orally 2 times a day acetaminophen 325 mg oral tablet: 2 tab(s) orally every 6 hours, As needed, Temp greater or equal to 38C (100.4F)  amiodarone 200 mg oral tablet: 0.5 tab(s) orally once a day  amLODIPine 5 mg oral tablet: 1 tab(s) orally once a day  apixaban 2.5 mg oral tablet: 1 tab(s) orally 2 times a day  metoprolol tartrate 25 mg oral tablet: 1 tab(s) orally 2 times a day  polyethylene glycol 3350 oral powder for reconstitution: 17 gram(s) orally once a day  senna oral tablet: 2 tab(s) orally once a day (at bedtime) acetaminophen 325 mg oral tablet: 2 tab(s) orally every 6 hours, As needed, Temp greater or equal to 38C (100.4F)  ALPRAZolam 0.25 mg oral tablet: 1 tab(s) orally 2 times a day  amiodarone 200 mg oral tablet: 0.5 tab(s) orally once a day  amLODIPine 5 mg oral tablet: 1 tab(s) orally once a day  apixaban 2.5 mg oral tablet: 1 tab(s) orally 2 times a day  melatonin 5 mg oral tablet: 1 tab(s) orally once a day (at bedtime)  metoprolol tartrate 25 mg oral tablet: 1 tab(s) orally 2 times a day  polyethylene glycol 3350 oral powder for reconstitution: 17 gram(s) orally once a day  senna oral tablet: 2 tab(s) orally once a day (at bedtime)

## 2020-03-23 NOTE — CHART NOTE - NSCHARTNOTEFT_GEN_A_CORE
patient was desating to 80s on 6L, was put on nonrebreather sat 98%, she was anxious and short of breath. asked the nurse to wean down oxygen if remain stable on non rebreather after 1-2 hours.  started hydroxychloroquine for now pending ID recommendation (),  1 dose of cefepime till ID recommendation , reevaluate tomorrow

## 2020-03-23 NOTE — DISCHARGE NOTE PROVIDER - NSDCFUADDINST_GEN_ALL_CORE_FT
You have been tested for the novel coronavirus (COVID-19).  Your results are PENDING. Upon discharge, you must self-quarantine for 14 days, or until the Department of Health contacts you. Please wear a face mask if you are around other individuals. Try to avoid contact with house members, family, and friends for the duration of this quarantine. Please follow up with your primary care physician within 2-3 weeks of your discharge from [facility]. Please take all medications as prescribed. If you experience any worsening or recurrence of your symptoms, particularly worsening or high fever, shortness of breathe, extreme fatigue, or bloody cough please call [phone] immediately or report to the nearest Emergency Department. If you have any questions or concerns, please do not hesitate to call the hospital at [phone].

## 2020-03-24 LAB
ALBUMIN SERPL ELPH-MCNC: 3 G/DL — LOW (ref 3.5–5.2)
ALP SERPL-CCNC: 78 U/L — SIGNIFICANT CHANGE UP (ref 30–115)
ALT FLD-CCNC: 42 U/L — HIGH (ref 0–41)
ANION GAP SERPL CALC-SCNC: 12 MMOL/L — SIGNIFICANT CHANGE UP (ref 7–14)
APTT BLD: 35.7 SEC — SIGNIFICANT CHANGE UP (ref 27–39.2)
AST SERPL-CCNC: 78 U/L — HIGH (ref 0–41)
BASOPHILS # BLD AUTO: 0 K/UL — SIGNIFICANT CHANGE UP (ref 0–0.2)
BASOPHILS NFR BLD AUTO: 0 % — SIGNIFICANT CHANGE UP (ref 0–1)
BILIRUB SERPL-MCNC: 0.4 MG/DL — SIGNIFICANT CHANGE UP (ref 0.2–1.2)
BUN SERPL-MCNC: 8 MG/DL — LOW (ref 10–20)
CALCIUM SERPL-MCNC: 7.9 MG/DL — LOW (ref 8.5–10.1)
CHLORIDE SERPL-SCNC: 105 MMOL/L — SIGNIFICANT CHANGE UP (ref 98–110)
CK SERPL-CCNC: 146 U/L — SIGNIFICANT CHANGE UP (ref 0–225)
CO2 SERPL-SCNC: 20 MMOL/L — SIGNIFICANT CHANGE UP (ref 17–32)
CREAT SERPL-MCNC: 0.6 MG/DL — LOW (ref 0.7–1.5)
CRP SERPL-MCNC: 5.84 MG/DL — HIGH (ref 0–0.4)
D DIMER BLD IA.RAPID-MCNC: 444 NG/ML DDU — HIGH (ref 0–230)
EOSINOPHIL # BLD AUTO: 0 K/UL — SIGNIFICANT CHANGE UP (ref 0–0.7)
EOSINOPHIL NFR BLD AUTO: 0 % — SIGNIFICANT CHANGE UP (ref 0–8)
FERRITIN SERPL-MCNC: 1047 NG/ML — HIGH (ref 15–150)
GLUCOSE SERPL-MCNC: 110 MG/DL — HIGH (ref 70–99)
HCT VFR BLD CALC: 38.2 % — SIGNIFICANT CHANGE UP (ref 37–47)
HCV AB S/CO SERPL IA: 0.18 S/CO — SIGNIFICANT CHANGE UP (ref 0–0.99)
HCV AB SERPL-IMP: SIGNIFICANT CHANGE UP
HGB BLD-MCNC: 12.3 G/DL — SIGNIFICANT CHANGE UP (ref 12–16)
IMM GRANULOCYTES NFR BLD AUTO: 0.8 % — HIGH (ref 0.1–0.3)
INR BLD: 1.31 RATIO — HIGH (ref 0.65–1.3)
LDH SERPL L TO P-CCNC: 792 — HIGH (ref 50–242)
LYMPHOCYTES # BLD AUTO: 0.36 K/UL — LOW (ref 1.2–3.4)
LYMPHOCYTES # BLD AUTO: 9.4 % — LOW (ref 20.5–51.1)
MAGNESIUM SERPL-MCNC: 2 MG/DL — SIGNIFICANT CHANGE UP (ref 1.8–2.4)
MCHC RBC-ENTMCNC: 28.1 PG — SIGNIFICANT CHANGE UP (ref 27–31)
MCHC RBC-ENTMCNC: 32.2 G/DL — SIGNIFICANT CHANGE UP (ref 32–37)
MCV RBC AUTO: 87.2 FL — SIGNIFICANT CHANGE UP (ref 81–99)
MONOCYTES # BLD AUTO: 0.12 K/UL — SIGNIFICANT CHANGE UP (ref 0.1–0.6)
MONOCYTES NFR BLD AUTO: 3.1 % — SIGNIFICANT CHANGE UP (ref 1.7–9.3)
NEUTROPHILS # BLD AUTO: 3.34 K/UL — SIGNIFICANT CHANGE UP (ref 1.4–6.5)
NEUTROPHILS NFR BLD AUTO: 86.7 % — HIGH (ref 42.2–75.2)
NRBC # BLD: 0 /100 WBCS — SIGNIFICANT CHANGE UP (ref 0–0)
PLATELET # BLD AUTO: 183 K/UL — SIGNIFICANT CHANGE UP (ref 130–400)
POTASSIUM SERPL-MCNC: 4.5 MMOL/L — SIGNIFICANT CHANGE UP (ref 3.5–5)
POTASSIUM SERPL-SCNC: 4.5 MMOL/L — SIGNIFICANT CHANGE UP (ref 3.5–5)
PROCALCITONIN SERPL-MCNC: 0.06 NG/ML — SIGNIFICANT CHANGE UP (ref 0.02–0.1)
PROT SERPL-MCNC: 5.4 G/DL — LOW (ref 6–8)
PROTHROM AB SERPL-ACNC: 15.1 SEC — HIGH (ref 9.95–12.87)
RBC # BLD: 4.38 M/UL — SIGNIFICANT CHANGE UP (ref 4.2–5.4)
RBC # FLD: 14 % — SIGNIFICANT CHANGE UP (ref 11.5–14.5)
SODIUM SERPL-SCNC: 137 MMOL/L — SIGNIFICANT CHANGE UP (ref 135–146)
VIT D25+D1,25 OH+D1,25 PNL SERPL-MCNC: 91 PG/ML — HIGH (ref 19.9–79.3)
WBC # BLD: 3.85 K/UL — LOW (ref 4.8–10.8)
WBC # FLD AUTO: 3.85 K/UL — LOW (ref 4.8–10.8)

## 2020-03-24 PROCEDURE — 71045 X-RAY EXAM CHEST 1 VIEW: CPT | Mod: 26

## 2020-03-24 PROCEDURE — 71045 X-RAY EXAM CHEST 1 VIEW: CPT | Mod: 26,77

## 2020-03-24 RX ORDER — FENTANYL CITRATE 50 UG/ML
50 INJECTION INTRAVENOUS ONCE
Refills: 0 | Status: DISCONTINUED | OUTPATIENT
Start: 2020-03-24 | End: 2020-03-24

## 2020-03-24 RX ORDER — TOCILIZUMAB 20 MG/ML
400 INJECTION, SOLUTION, CONCENTRATE INTRAVENOUS ONCE
Refills: 0 | Status: COMPLETED | OUTPATIENT
Start: 2020-03-24 | End: 2020-03-24

## 2020-03-24 RX ORDER — CEFTRIAXONE 500 MG/1
1000 INJECTION, POWDER, FOR SOLUTION INTRAMUSCULAR; INTRAVENOUS ONCE
Refills: 0 | Status: DISCONTINUED | OUTPATIENT
Start: 2020-03-24 | End: 2020-03-24

## 2020-03-24 RX ORDER — AMPICILLIN SODIUM AND SULBACTAM SODIUM 250; 125 MG/ML; MG/ML
3 INJECTION, POWDER, FOR SUSPENSION INTRAMUSCULAR; INTRAVENOUS EVERY 6 HOURS
Refills: 0 | Status: DISCONTINUED | OUTPATIENT
Start: 2020-03-24 | End: 2020-03-28

## 2020-03-24 RX ORDER — ACETAMINOPHEN 500 MG
650 TABLET ORAL EVERY 6 HOURS
Refills: 0 | Status: DISCONTINUED | OUTPATIENT
Start: 2020-03-24 | End: 2020-03-24

## 2020-03-24 RX ORDER — ACETAMINOPHEN 500 MG
650 TABLET ORAL EVERY 6 HOURS
Refills: 0 | Status: DISCONTINUED | OUTPATIENT
Start: 2020-03-24 | End: 2020-03-28

## 2020-03-24 RX ORDER — FENTANYL CITRATE 50 UG/ML
1 INJECTION INTRAVENOUS
Qty: 2500 | Refills: 0 | Status: DISCONTINUED | OUTPATIENT
Start: 2020-03-24 | End: 2020-03-25

## 2020-03-24 RX ORDER — ENOXAPARIN SODIUM 100 MG/ML
65 INJECTION SUBCUTANEOUS EVERY 12 HOURS
Refills: 0 | Status: DISCONTINUED | OUTPATIENT
Start: 2020-03-24 | End: 2020-04-10

## 2020-03-24 RX ORDER — NOREPINEPHRINE BITARTRATE/D5W 8 MG/250ML
0.05 PLASTIC BAG, INJECTION (ML) INTRAVENOUS
Qty: 32 | Refills: 0 | Status: DISCONTINUED | OUTPATIENT
Start: 2020-03-24 | End: 2020-03-24

## 2020-03-24 RX ORDER — PANTOPRAZOLE SODIUM 20 MG/1
40 TABLET, DELAYED RELEASE ORAL DAILY
Refills: 0 | Status: DISCONTINUED | OUTPATIENT
Start: 2020-03-24 | End: 2020-04-16

## 2020-03-24 RX ORDER — NOREPINEPHRINE BITARTRATE/D5W 8 MG/250ML
0.05 PLASTIC BAG, INJECTION (ML) INTRAVENOUS
Qty: 8 | Refills: 0 | Status: DISCONTINUED | OUTPATIENT
Start: 2020-03-24 | End: 2020-03-31

## 2020-03-24 RX ORDER — ONDANSETRON 8 MG/1
4 TABLET, FILM COATED ORAL ONCE
Refills: 0 | Status: COMPLETED | OUTPATIENT
Start: 2020-03-24 | End: 2020-03-24

## 2020-03-24 RX ORDER — CHLORHEXIDINE GLUCONATE 213 G/1000ML
15 SOLUTION TOPICAL
Refills: 0 | Status: DISCONTINUED | OUTPATIENT
Start: 2020-03-24 | End: 2020-04-05

## 2020-03-24 RX ORDER — SODIUM CHLORIDE 9 MG/ML
1000 INJECTION, SOLUTION INTRAVENOUS
Refills: 0 | Status: DISCONTINUED | OUTPATIENT
Start: 2020-03-24 | End: 2020-03-25

## 2020-03-24 RX ORDER — MIDAZOLAM HYDROCHLORIDE 1 MG/ML
0.08 INJECTION, SOLUTION INTRAMUSCULAR; INTRAVENOUS
Qty: 100 | Refills: 0 | Status: DISCONTINUED | OUTPATIENT
Start: 2020-03-24 | End: 2020-03-29

## 2020-03-24 RX ORDER — CEFTRIAXONE 500 MG/1
INJECTION, POWDER, FOR SOLUTION INTRAMUSCULAR; INTRAVENOUS
Refills: 0 | Status: DISCONTINUED | OUTPATIENT
Start: 2020-03-24 | End: 2020-03-24

## 2020-03-24 RX ORDER — MIDAZOLAM HYDROCHLORIDE 1 MG/ML
5 INJECTION, SOLUTION INTRAMUSCULAR; INTRAVENOUS ONCE
Refills: 0 | Status: DISCONTINUED | OUTPATIENT
Start: 2020-03-24 | End: 2020-03-24

## 2020-03-24 RX ORDER — SODIUM CHLORIDE 9 MG/ML
1000 INJECTION, SOLUTION INTRAVENOUS
Refills: 0 | Status: DISCONTINUED | OUTPATIENT
Start: 2020-03-24 | End: 2020-03-24

## 2020-03-24 RX ADMIN — AMPICILLIN SODIUM AND SULBACTAM SODIUM 200 GRAM(S): 250; 125 INJECTION, POWDER, FOR SUSPENSION INTRAMUSCULAR; INTRAVENOUS at 17:41

## 2020-03-24 RX ADMIN — Medication 650 MILLIGRAM(S): at 14:13

## 2020-03-24 RX ADMIN — MIDAZOLAM HYDROCHLORIDE 5 MILLIGRAM(S): 1 INJECTION, SOLUTION INTRAMUSCULAR; INTRAVENOUS at 08:42

## 2020-03-24 RX ADMIN — ONDANSETRON 4 MILLIGRAM(S): 8 TABLET, FILM COATED ORAL at 04:17

## 2020-03-24 RX ADMIN — FENTANYL CITRATE 3.29 MICROGRAM(S)/KG/HR: 50 INJECTION INTRAVENOUS at 07:01

## 2020-03-24 RX ADMIN — Medication 25 MILLIGRAM(S): at 05:52

## 2020-03-24 RX ADMIN — AMPICILLIN SODIUM AND SULBACTAM SODIUM 200 GRAM(S): 250; 125 INJECTION, POWDER, FOR SUSPENSION INTRAMUSCULAR; INTRAVENOUS at 21:06

## 2020-03-24 RX ADMIN — CHLORHEXIDINE GLUCONATE 15 MILLILITER(S): 213 SOLUTION TOPICAL at 17:13

## 2020-03-24 RX ADMIN — Medication 650 MILLIGRAM(S): at 20:27

## 2020-03-24 RX ADMIN — Medication 650 MILLIGRAM(S): at 21:07

## 2020-03-24 RX ADMIN — Medication 200 MILLIGRAM(S): at 17:12

## 2020-03-24 RX ADMIN — MIDAZOLAM HYDROCHLORIDE 1.32 MG/KG/HR: 1 INJECTION, SOLUTION INTRAMUSCULAR; INTRAVENOUS at 07:01

## 2020-03-24 RX ADMIN — PANTOPRAZOLE SODIUM 40 MILLIGRAM(S): 20 TABLET, DELAYED RELEASE ORAL at 17:13

## 2020-03-24 RX ADMIN — TOCILIZUMAB 100 MILLIGRAM(S): 20 INJECTION, SOLUTION, CONCENTRATE INTRAVENOUS at 17:42

## 2020-03-24 RX ADMIN — APIXABAN 2.5 MILLIGRAM(S): 2.5 TABLET, FILM COATED ORAL at 05:52

## 2020-03-24 RX ADMIN — Medication 400 MILLIGRAM(S): at 05:52

## 2020-03-24 RX ADMIN — ENOXAPARIN SODIUM 65 MILLIGRAM(S): 100 INJECTION SUBCUTANEOUS at 17:11

## 2020-03-24 NOTE — CONSULT NOTE ADULT - SUBJECTIVE AND OBJECTIVE BOX
Patient is a 70y old  Female who presents with a chief complaint of covid r/o given fevers, non productive cough (23 Mar 2020 13:16)      HPI:  69 y/o female with pmh of a-fib on Eliquis , c/o fever, non-productive cough, body aches and decreased appetite x 7 days. No known sick contacts. No diarrhea. No ABD pain. No H/A, no neck pain. No dysuria/frequency/urgency. Presented with sob  No hemoptysis.  Patient admitted to internal medicine service for acute hypoxemic respiratory failure 2/2 covid19 . MICU has been consulted for worsening hypoxia .Patient intubated in CEU       PAST MEDICAL & SURGICAL HISTORY:  Afib      SOCIAL HX:   Smoking         denies                ETOH    denies                        Other    FAMILY HISTORY:  :  No known cardiovacular family hisotry     Review Of Systems:     CONSTITUTIONAL:   + fever   no chills.  no weight gain   no weight loss    EYES:   no discharge,   no pain  no redness,   no visual changes.    ENT:   Ears: no ear pain and no hearing problems.  Nose: no nasal congestion and no nasal drainage.  Mouth/Throat: no dysphagia,  no hoarseness and no throat pain.  Neck: no lumps, no pain, no stiffness and no swollen glands.     CARDIOVASCULAR:   as per HPI    RESPIRATORY:  as per HPI     GASTROINTESTINAL:   no abdominal pain,   no constipation,   no diarrhea,   no vomiting.    GENITOURINARY:  no dysuria,   no frequency,   no urgency  no hematuria.    SKIN:   no jaundice,   no lesions,   no pruritis,   no rashes.    NEURO:   as per HPI    PSYCHIATRIC:   no known mental health issues  no anxiety  no depression    ALLERGIC/IMMUNOLOGIC:   No active allergic or immunologic issues      Allergies    Originally Entered as [Unknown] reaction to [green pepper] (Unknown)  Originally Entered as [Unknown] reaction to [pepper] (Unknown)  sulfa drugs (Unknown)    Intolerances          PHYSICAL EXAM    ICU Vital Signs Last 24 Hrs  T(C): 39.2 (24 Mar 2020 12:00), Max: 39.2 (24 Mar 2020 12:00)  T(F): 102.6 (24 Mar 2020 12:00), Max: 102.6 (24 Mar 2020 12:00)  HR: 80 (24 Mar 2020 13:00) (72 - 88)  BP: 83/42 (24 Mar 2020 13:00) (83/42 - 123/56)  BP(mean): 54 (24 Mar 2020 13:00) (54 - 61)  ABP: --  ABP(mean): --  RR: 40 (24 Mar 2020 13:00) (14 - 40)  SpO2: 100% (24 Mar 2020 13:00) (84% - 100%)      CONSTITUTIONAL:   Well nourished.  NAD    ENT:   + ET   Airway patent,   Mouth with normal mucosa.   No thrush    EYES:   pupils equal,   round and reactive to light.    CARDIAC:   Normal rate,   irregular rhythm.    Heart sounds S1, S2.   No edema      Vascular:   normal systolic impulse  no bruits    RESPIRATORY:   No wheezing   b/l crackles+  Normal chest expansion  No use of accessory muscles    GASTROINTESTINAL:  Abdomen soft   Non-tender,   No guarding,   + BS    GENITOURINARY  normal genitalia for sex  no edema    MUSCULOSKELETAL:   Range of motion is not limited,  no clubbing, cyanosis    NEUROLOGICAL:   sedated   Non focal      SKIN:   Skin normal color for race,   Warm and dry  No evidence of rash.    PSYCHIATRIC:   Normal mood and affect.   No apparent risk to self or others.    HEME LYMPH:   No cervical  lymphadenopathy.  No inguinal lymphadenopathy            20 @ 07:01  -  20 @ 13:33  --------------------------------------------------------  IN:    fentaNYL Infusion.: 4 mL    midazolam Infusion: 4 mL  Total IN: 8 mL    OUT:  Total OUT: 0 mL    Total NET: 8 mL          LABS:                          12.3   3.85  )-----------( 183      ( 24 Mar 2020 07:47 )             38.2                                                              12.3   3.85  )-----------( 183      (  @ 07:47 )             38.2                13.1   2.67  )-----------( 167      (  @ 08:12 )             38.4                14.4   2.89  )-----------( 170      (  @ 13:15 )             40.4         137  |  105  |  8<L>  ----------------------------<  110<H>  4.5   |  20  |  0.6<L>    24 Mar 2020 07:47    137    |  105    |  8<L>   ----------------------------<  110<H>  4.5     |  20     |  0.6<L>  23 Mar 2020 08:12    140    |  104    |  14     ----------------------------<  100<H>  3.3<L>   |  22     |  0.7      Ca    7.9<L>      24 Mar 2020 07:47  Ca    7.8<L>      23 Mar 2020 08:12  Phos  2.9       23 Mar 2020 08:12  Mg     2.0       24 Mar 2020 07:47  Mg     2.0       23 Mar 2020 08:12    TPro  5.4<L>  /  Alb  3.0<L>  /  TBili  0.4    /  DBili  x      /  AST  78<H>  /  ALT  42<H>  /  AlkPhos  78     24 Mar 2020 07:47  TPro  5.2<L>  /  Alb  3.0<L>  /  TBili  0.4    /  DBili  x      /  AST  51<H>  /  ALT  35     /  AlkPhos  57     23 Mar 2020 08:12    Ca    7.9<L>      24 Mar 2020 07:47  Phos  2.9     03-23  Mg     2.0     03-24    TPro  5.4<L>  /  Alb  3.0<L>  /  TBili  0.4  /  DBili  x   /  AST  78<H>  /  ALT  42<H>  /  AlkPhos  78  03-24      PT/INR - ( 24 Mar 2020 07:47 )   PT: 15.10 sec;   INR: 1.31 ratio         PTT - ( 24 Mar 2020 07:47 )  PTT:35.7 sec                                       Urinalysis Basic - ( 22 Mar 2020 17:29 )    Color: Light Yellow / Appearance: Clear / S.007 / pH: x  Gluc: x / Ketone: Negative  / Bili: Negative / Urobili: <2 mg/dL   Blood: x / Protein: Trace / Nitrite: Negative   Leuk Esterase: Negative / RBC: x / WBC x   Sq Epi: x / Non Sq Epi: x / Bacteria: x        CARDIAC MARKERS ( 24 Mar 2020 07:47 )  x     / x     / 146 U/L / x     / x                                                LIVER FUNCTIONS - ( 24 Mar 2020 07:47 )  Alb: 3.0 g/dL / Pro: 5.4 g/dL / ALK PHOS: 78 U/L / ALT: 42 U/L / AST: 78 U/L / GGT: x                                                  Culture - Blood (collected 22 Mar 2020 13:15)  Source: .Blood Blood-Peripheral  Preliminary Report (23 Mar 2020 20:01):    No growth to date.    Culture - Blood (collected 22 Mar 2020 13:15)  Source: .Blood Blood-Peripheral  Preliminary Report (23 Mar 2020 20:01):    No growth to date.                                                   Mode: AC/ CMV (Assist Control/ Continuous Mandatory Ventilation)  RR (machine): 20  TV (machine): 400  FiO2: 100  PEEP: 10  MAP: 14  PIP: 30                                      ABG - ( 24 Mar 2020 11:18 )  pH, Arterial: 7.41  pH, Blood: x     /  pCO2: 37    /  pO2: 146   / HCO3: 23    / Base Excess: -1.2  /  SaO2: 99                  X-Rays reviewed:        < from: Xray Chest 1 View- PORTABLE-Urgent (20 @ 11:07) >  Stable focal right upper lung opacity and bilateral lower lobe predominant interstitial and airspace opacities. No pneumothorax.ET tube high     < end of copied text >                                                                              ECHO not done      MEDICATIONS  (STANDING):  apixaban 2.5 milliGRAM(s) Oral every 12 hours  chlorhexidine 0.12% Liquid 15 milliLiter(s) Oral Mucosa two times a day  chlorhexidine 4% Liquid 1 Application(s) Topical <User Schedule>  fentaNYL   Infusion. 1 MICROgram(s)/kG/Hr (6.58 mL/Hr) IV Continuous <Continuous>  hydroxychloroquine 200 milliGRAM(s) Oral every 12 hours  hydroxychloroquine   Oral   metoprolol tartrate 25 milliGRAM(s) Oral two times a day  midazolam Infusion 0.08 mG/kG/Hr (5.26 mL/Hr) IV Continuous <Continuous>  pantoprazole   Suspension 40 milliGRAM(s) Oral daily    MEDICATIONS  (PRN):  acetaminophen   Tablet .. 650 milliGRAM(s) Oral every 6 hours PRN Temp greater or equal to 38C (100.4F)  acetaminophen  Suppository .. 650 milliGRAM(s) Rectal every 6 hours PRN Temp greater or equal to 38C (100.4F)

## 2020-03-24 NOTE — PROCEDURE NOTE - ADDITIONAL PROCEDURE DETAILS
Patient:   ZOLTAN HOWARD            MRN: [id number]            FIN: [id number]               Age:   89 years     Sex:  FEMALE     :  19   Associated Diagnoses:   None   Author:   AMBER PEREZ      Chief complaint:  Hypotension, minimally responsive      History of present illness:  Patient is a 89-year-old female who presents to ED via EMS for evaluation of hypotension.  Patient seen and evaluated this evening in ED.  Caregiver, Mahi, at bedside.  Patient drowsy and minimally responsive, also with history of advanced dementia.  Unable to provide any meaningful history.  EMS and ED documentation reviewed.  Per EMS report, 911 called for hypotension.  BP 86/60 upon EMS arrival and patient essentially unresponsive.  Per EMS, patient enrolled in hospice and presumably a DNR/DNI until earlier today.  Unclear if patient DNR revoked or on enrolled in hospice.  Regardless, at time of call to 911 patient CODE STATUS determined to be full code.  Had received morphine and Ativan just prior to EMS arrival.  In ED, patient received 1 L IV fluid.  After discussion between ED attending and clinical ethicist Beka Miramontes followed discussion between myself and ED attending, decision was made to proceed with unilateral DNR/full LET orders.  Patient to be admitted with plan of care entirely comfort focused.      REVIEW OF SYSTEMS:  Review of systems unable to obtain secondary to: Clinical condition, comatose mental status, advanced dementia    Past medical history  CVA (cerebral vascular accident)  Dementia  HTN (hypertension)  Osteoarthritis  Chronic dysphasia    Past surgical history  Unable to obtain    Family history  Unknown    Social history   Alcohol  Details: Use: unable to obtain. Pt is nonverbal. NH paperwork has no hx. Also CG unsure.  Exercise  Details: Exercise: Never.  Substance Abuse  Details: Use: unable to obtain. No hx per NH paperwork. Caregiver unsure.  Tobacco  Details: Smoker in [place]: No.   Smoked/Smokeless Tobacco Last 30 Days: No.  Smoking Tobacco Use: Unknown if ever smoked.  Smokeless Tobacco Use Not obtained due to cognitive impairment.      Home Medications (8) Active  atropine oral 1% solution 2 drop, PRN, SL, Q4H  betamethasone-clotrimazole topical 0.05%-1% cream (Lotrisone) 1 application, Topical, BID  bisacodyl rectal 10 mg suppository 10 mg = 1 suppository, PRN, Rectal, Daily  DuoNeb inhalation 2.5-0.5 mg/3 mL solution 3 mL, PRN, Inhaled, Q6H  LORazepam oral 2 mg/mL concentrate (Ativan) 0.5 mg = 0.25 mL, PRN, Oral, Q2H  LORazepam oral 2 mg/mL concentrate (Ativan) 1 mg = 0.5 mL, Oral, Q Bedtime  morphine oral 100 mg/5 mL CONCENTRATE (Roxanol) 5 mg = 0.25 mL, PRN, Oral, Q4H  Nystop 100,000 units/g topical powder 1 application, PRN, Topical, BID    Allergies (2) Active Reaction  NKA None Documented  No Known Medication Allergies None Documented    Immunizations  Pneumovax up to date  Flu shot indicated      Objective:      Vitals between:   23-APR-2018 18:12:36   TO   24-APR-2018 18:12:36                   LAST RESULT MINIMUM MAXIMUM  Temperature 36.6 36.6 36.6  Heart Rate 71 69 79  Respiratory Rate 20 20 20  NISBP           106 64 106  NIDBP           52 52 56  NIMBP           70 56 70  SpO2                    96 95 98      PHYSICAL EXAM:  Constitutional:  Obtunded, minimally arousable, chronically ill-appearing, no acute distress  Eyes:  No conjuctivitis, no scleral injection, no ocular discharge  Ears, nose, mouth, throat:  Oral mucosa dry, no oral plaques or erythema  Cardiovascular:  Regular rate and rhythm, + systolic murmurs, no peripheral edema  Respiratory:  Non labored breathing on room air, poor inspiratory effort, clear to ascultation bilaterally, no wheezing, no rales, no ronchi  Gastrointestinal:  Flat, normal bowel sounds, soft  Skin:  Normal for ethnicity, no rashes  Neurologic:  Intermittently opens eyes to voice, does not follow commands, no verbal response, withdrawals from  pain       Labs between:  23-APR-2018 18:12 to 24-APR-2018 18:12    CBC:                 WBC  HgB  Hct  Plt  MCV  RDW   24-APR-2018 (H) 14.4  12.6  43.2  (L) 110  (H) 111.1  (H) 19.5     DIFF:                 Seg  Neutroph//ABS  Lymph//ABS  Mono//ABS  EOS/ABS   24-APR-2018 79  // (H) 11.5  // 1.9 // 0.3 // (H) 0.7     BMP:                 Na  Cl  BUN  Glu   24-APR-2018 (!) 178  (H) 145  (H) 109  (H) 128                              K  CO2  Cr  Ca                              4.8  24  (H) 2.65  9.4     CMP:                 AST  ALT  AlkPhos  Bili  Albumin   24-APR-2018 25  21  (H) 128  0.8  (L) 3.0     Other Chem:             Mg  Phos  Triglycerides  GGTP  DirectBili                           (H) 4.0                            Cardiovascular Labs    Troponin 0.63 ng/mL (04/24/18 16:00)     Lactic acid 2.6    Radiology  Result title:  XR CHEST PORTABLE 1V  Result status:  Final  Verified by:  SUNNY SARMIENTO on 04/24/2018 6:53  FINDINGS: Heart size is normal.  Superior mediastinum is not widened.  No densities in the left base noted.  Lungs are otherwise clear  IMPRESSION: New left basilar density suggesting infiltrates or atelectasis.       Medications (6) Active  Scheduled: (1)  Scopolamine 1.5 mg (delivers 1 mg) 3-day ER patch  1 patch, Topical, Q72H  Continuous: (0)  PRN: (5)  Acetaminophen 650 mg suppos  650 mg 1 suppository, Rectal, Q4H  Albuterol-ipratropium 2.5-0.5 mg/3 mL nebulizer soln  3 mL, Nebulizer, Q4H  Haloperidol 5 mg/1 mL inj SDV  1 mg 0.2 mL, IM, Q6H  LORazepam 2 mg/1 mL inj SDV  0.5 mg 0.25 mL, Slow IV Push, Q4H  Morphine PF 4 mg/1 mL inj SDV  2 mg 0.5 mL, IV Push, Q1H      Based on the patient's presentation on admission, I expect the patient to require at least 2 midnights of medically necessary Hospital services for the following reasons: Hypernatremia, metabolic encephalopathy, minimally responsive      Assessment and Plan:  #1.  Minimally responsive mental status secondary to metabolic  encephalopathy from severe hypernatremia -> comfort focused plan of care, no IV fluids, no repeat labs, IV hydromorphone for pain or discomfort, IV lorazepam for anxiety or agitation, IM Haldol for agitation, place scopolamine patch to decrease secretions  #2.  Hypotension likely hypovolemic plus medication/opiate related -> no further workup or medical intervention, no IV fluids  #3.  Advanced multi-infarct dementia -> comfort focused goals of care as outlined above  DVT prophylaxis: No SCDs or chemoprophylaxis given comfort focused goals of care    Code status: Full LET.  Decision for unilateral DNR/full LET discussed over telephone with Guardian on call, Elena.  Both myself and ED attending Dr. JORDON Lund in in agreement with regards to plan for full LET code status and overal goals of care.  Pateint dismal clinical condition and futility of any intervention apart from purely comfort focused approach discussed in detail.  Documentation of palliative care, clinincal ethics and medicine service also reviewed and explained pateint code status was DNR/full LET during previus recent hospitalization.  State Guardian expresses objection to full LET orders.  Policy regarding unilateral DNR explained and all questions answered.  Patient CODE STATUS remains full LET.  Guardian:  Bhupendra Vazquez, office of public guardianship ([employer]), [phone]  Primary care physician, Dr. Rhina Rose    Case amd plan for comfort focused goals of care discussed with ED attending Dr. JORDON Lnud, both Dr. Lund and myself are in full agreement with futility of either aggressive or selective interventions given clinical contidion and unilateral LET orders placed  Disposition: Short-term prognosis poor, explained to on call state Guardian Elena who understands    Emma Pedro DO  [facility] [place] [facility]            Electronically Signed On 04/25/2018 00:53  __________________________________________________   EMMA PEREZ     pre induction VS HR 80s RR 30 o2 sat 84 on NRBM /64 etomidate 10 mg propofol 50mg succinylcholine 100 mg IV given glidescope mac 3 used view grade 1 easy quick intubation. post VS HR 88 o2 sat 96 bp 90/55 on vent.

## 2020-03-24 NOTE — CONSULT NOTE ADULT - ASSESSMENT
IMPRESSION:    Acute Hypoxemic Respiratory Failure 2/2 COVID 19  ARDS  H/O Afib on Eliquis    PLAN:    CNS: Sedation with fentanyl and versed    HEENT: ET care ,oral care    PULMONARY:  HOB @ 45 degrees. Aspiration precautions . Advance ET 2cm . Vent changes as follows: ARDS network protocol. Dec fio2     CARDIOVASCULAR: Avoid volume overload , ECHO . d/c metoprolol.     GI: GI prophylaxis. f/u LFTs Feeding : OG feeding     RENAL:  Follow up lytes.  Correct as needed    INFECTIOUS DISEASE: Follow up cultures; Hydroxychloroquine . ABX Rocephin and doxycyline . Nasal MRSA, Procalcitonin    HEMATOLOGICAL:  DVT prophylaxis. d/c eliquis. Can start LMWH     ENDOCRINE:  Follow up FS.  Insulin protocol if needed.    MUSCULOSKELETAL: Bedrest    Lines: TLC     Disposition: MICU monitoring IMPRESSION:    Acute Hypoxemic Respiratory Failure 2/2 COVID 19  ARDS  H/O Afib on Eliquis    PLAN:    CNS: Sedation with fentanyl and versed    HEENT: ET care ,oral care    PULMONARY:  HOB @ 45 degrees. Aspiration precautions. Vent changes as follows: ARDS network protocol. Dec fio2.  PEEP 12.5     CARDIOVASCULAR: Avoid volume overload , ECHO. DC  metoprolol.     GI: GI prophylaxis. f/u LFTs Feeding : OG feeding     RENAL:  Follow up lytes.  Correct as needed    INFECTIOUS DISEASE: Follow up cultures; Hydroxychloroquine.  Unasyn for now.  Nasal MRSA, Procalcitonin. DW ID approved for Tocilizumab        HEMATOLOGICAL:  DVT prophylaxis. d/c eliquis. Can start LMWH     ENDOCRINE:  Follow up FS.  Insulin protocol if needed.    MUSCULOSKELETAL: Bedrest    Lines: TLC     Disposition: MICU monitoring

## 2020-03-24 NOTE — CHART NOTE - NSCHARTNOTEFT_GEN_A_CORE
I was called to bedside by resident and RN. Pt noted to be tachypneic and hypoxic (high 80s) on non-rebreather. Using accessory muscles. She is alert but confused. Pt is full code. Decision made to intubate pt for acute hypoxic respiratory failure and airway protection. Anesthesia called. Pt intubated. B/L chest rise and air entry. Post-procedure Chest X-Ray and ABG ordered. Started pt on Fentanyl and Versed. MICU accepted pt for transfer.     Family updated     CCT < 1 hour

## 2020-03-24 NOTE — CONSULT NOTE ADULT - COMMENTS
intubated, FiO2 60%/PEEP 12  on levo  sedated, non responsive  left subclavian catheter with no erythema

## 2020-03-24 NOTE — CONSULT NOTE ADULT - ASSESSMENT
69 y/o female with pmh of a-fib on Eliquis , c/o fever, non-productive cough, body aches and decreased appetite x 7 days. No known sick contacts. No diarrhea. No ABD pain. No H/A, no neck pain. No dysuria/frequency/urgency. Presented with sob  No hemoptysis.  Patient admitted to internal medicine service for acute hypoxemic respiratory failure 2/2 covid19 .Patient intubated in CEU     IMPRESSION:  #Severe septic shock ( ( T 102.6, WBC 3.8, on levo, BP 89/45 ) secondary to COVID19 with no end organ damage  -CXR progressively worse since admission with bibasilar opacities  -no ARF  -ALT mild elevation    RECOMMENDATIONS:  -Tocilizumab as per protocol  -inflammatory markers ( along with trops )  -cytokine markers IL 6 prior to  Toci  -BCX  -HIV test  -prognosis is extremely poor given the imminent possibility of CRS

## 2020-03-24 NOTE — CHART NOTE - NSCHARTNOTEFT_GEN_A_CORE
71 y/o female with pmh of a-fib, c/o fever, non-productive cough, body aches and decreased appetite x 7 days. No known sick contacts.  COVID positive.  During her time here she was steadily needing higher O2 requirements.  She began desaturating on nonrebreather to mid 80s to low 90s and tachypneic with AMS and necessitated intubation. 71 y/o female with pmh of a-fib, c/o fever, non-productive cough, body aches and decreased appetite x 7 days. No known sick contacts.  COVID positive.  During her time here she was steadily needing higher O2 requirements.  She began desaturating on nonrebreather to mid 80s to low 90s, tachypneic with AMS, and necessitated intubation.    Vital Signs Last 24 Hrs  T(C): 37.5 (03-23-20 @ 21:32)  T(F): 99.5 (03-23-20 @ 21:32), Max: 100.9 (03-23-20 @ 18:00)  HR: 86 (03-23-20 @ 21:32) (74 - 86)  BP: 116/57 (03-23-20 @ 21:32)  BP(mean): 68 (03-23-20 @ 12:30) (68 - 68)  RR: 19 (03-23-20 @ 21:32) (16 - 22)  SpO2: 87% (03-24-20 @ 06:14) (84% - 98%)  Wt(kg): --      # Acute hypoxemic respiratory failure secondary to COVID 19 infection  - confirmed COVID 19 positive  - continue with plaquenil  - f/u daily EKGs  - f/u chest xray post intubation and ABGs    # Chronic afib with rvr  - lopressor 25 bid / apixaban 2.5 bid 71 y/o female with pmh of a-fib, c/o fever, non-productive cough, body aches and decreased appetite x 7 days. No known sick contacts.  COVID positive.  During her time here she was steadily needing higher O2 requirements.  She began desaturating on nonrebreather to mid 80s to low 90s, tachypneic with AMS, and necessitated intubation.    Vital Signs Last 24 Hrs  T(C): 37.5 (03-23-20 @ 21:32)  T(F): 99.5 (03-23-20 @ 21:32), Max: 100.9 (03-23-20 @ 18:00)  HR: 86 (03-23-20 @ 21:32) (74 - 86)  BP: 116/57 (03-23-20 @ 21:32)  BP(mean): 68 (03-23-20 @ 12:30) (68 - 68)  RR: 19 (03-23-20 @ 21:32) (16 - 22)  SpO2: 87% (03-24-20 @ 06:14) (84% - 98%)  Wt(kg): --      # Acute hypoxemic respiratory failure secondary to COVID 19 infection  - confirmed COVID 19 positive  - continue with plaquenil  - f/u daily EKGs  - f/u chest xray post intubation and ABGs    # Chronic afib with rvr  - lopressor 25 bid / apixaban 2.5 bid    # DVT PPX: eliquis 2.5 bid  # GI PPX: protonix once intubated  FULL CODE

## 2020-03-24 NOTE — CONSULT NOTE ADULT - SUBJECTIVE AND OBJECTIVE BOX
LEAH CLIFTON  70y, Female  Allergy: Originally Entered as [Unknown] reaction to [green pepper] (Unknown)  Originally Entered as [Unknown] reaction to [pepper] (Unknown)  sulfa drugs (Unknown)      All historical available data reviewed.    HPI:  71 y/o female with pmh of a-fib on Eliquis , c/o fever, non-productive cough, body aches and decreased appetite x 7 days. No known sick contacts. No diarrhea. No ABD pain. No H/A, no neck pain. No dysuria/frequency/urgency. Presented with sob  No hemoptysis.  Patient admitted to internal medicine service for acute hypoxemic respiratory failure  covid19  .Patient intubated in CEU         FAMILY HISTORY:    PAST MEDICAL & SURGICAL HISTORY:  Afib        VITALS:  T(F): 101, Max: 102.6 (-20 @ 12:00)  HR: 74  BP: 89/45  RR: 22Vital Signs Last 24 Hrs  T(C): 38.3 (24 Mar 2020 14:00), Max: 39.2 (24 Mar 2020 12:00)  T(F): 101 (24 Mar 2020 14:00), Max: 102.6 (24 Mar 2020 12:00)  HR: 74 (24 Mar 2020 15:00) (72 - 88)  BP: 89/45 (24 Mar 2020 14:30) (80/43 - 123/56)  BP(mean): 56 (24 Mar 2020 14:30) (54 - 66)  RR: 22 (24 Mar 2020 15:00) (14 - 40)  SpO2: 99% (24 Mar 2020 15:00) (84% - 100%)    TESTS & MEASUREMENTS:                        12.3   3.85  )-----------( 183      ( 24 Mar 2020 07:47 )             38.2     03-24    137  |  105  |  8<L>  ----------------------------<  110<H>  4.5   |  20  |  0.6<L>    Ca    7.9<L>      24 Mar 2020 07:47  Phos  2.9     03-23  Mg     2.0     -24    TPro  5.4<L>  /  Alb  3.0<L>  /  TBili  0.4  /  DBili  x   /  AST  78<H>  /  ALT  42<H>  /  AlkPhos  78  0324    LIVER FUNCTIONS - ( 24 Mar 2020 07:47 )  Alb: 3.0 g/dL / Pro: 5.4 g/dL / ALK PHOS: 78 U/L / ALT: 42 U/L / AST: 78 U/L / GGT: x             Culture - Blood (collected 20 @ 13:15)  Source: .Blood Blood-Peripheral  Preliminary Report (20 @ 20:01):    No growth to date.    Culture - Blood (collected 20 @ 13:15)  Source: .Blood Blood-Peripheral  Preliminary Report (20 @ 20:01):    No growth to date.      Urinalysis Basic - ( 22 Mar 2020 17:29 )    Color: Light Yellow / Appearance: Clear / S.007 / pH: x  Gluc: x / Ketone: Negative  / Bili: Negative / Urobili: <2 mg/dL   Blood: x / Protein: Trace / Nitrite: Negative   Leuk Esterase: Negative / RBC: x / WBC x   Sq Epi: x / Non Sq Epi: x / Bacteria: x          RADIOLOGY & ADDITIONAL TESTS:  Personal review of radiological diagnostics performed  Echo and EKG results noted when applicable.     MEDICATIONS:  acetaminophen  Suppository .. 650 milliGRAM(s) Rectal every 6 hours PRN  ampicillin/sulbactam  IVPB 3 Gram(s) IV Intermittent every 6 hours  chlorhexidine 0.12% Liquid 15 milliLiter(s) Oral Mucosa two times a day  chlorhexidine 4% Liquid 1 Application(s) Topical <User Schedule>  enoxaparin Injectable 65 milliGRAM(s) SubCutaneous every 12 hours  fentaNYL   Infusion. 1 MICROgram(s)/kG/Hr IV Continuous <Continuous>  hydroxychloroquine 200 milliGRAM(s) Oral every 12 hours  hydroxychloroquine   Oral   lactated ringers. 1000 milliLiter(s) IV Continuous <Continuous>  midazolam Infusion 0.08 mG/kG/Hr IV Continuous <Continuous>  norepinephrine Infusion 0.05 MICROgram(s)/kG/Min IV Continuous <Continuous>  pantoprazole   Suspension 40 milliGRAM(s) Oral daily  tocilizumab IVPB 400 milliGRAM(s) IV Intermittent once      ANTIBIOTICS:  ampicillin/sulbactam  IVPB 3 Gram(s) IV Intermittent every 6 hours  hydroxychloroquine 200 milliGRAM(s) Oral every 12 hours  hydroxychloroquine   Oral

## 2020-03-25 LAB
ALBUMIN SERPL ELPH-MCNC: 2.5 G/DL — LOW (ref 3.5–5.2)
ALP SERPL-CCNC: 82 U/L — SIGNIFICANT CHANGE UP (ref 30–115)
ALT FLD-CCNC: 36 U/L — SIGNIFICANT CHANGE UP (ref 0–41)
ANION GAP SERPL CALC-SCNC: 13 MMOL/L — SIGNIFICANT CHANGE UP (ref 7–14)
APTT BLD: 38.2 SEC — SIGNIFICANT CHANGE UP (ref 27–39.2)
AST SERPL-CCNC: 61 U/L — HIGH (ref 0–41)
BASE EXCESS BLDA CALC-SCNC: -3.4 MMOL/L — LOW (ref -2–2)
BILIRUB SERPL-MCNC: 0.3 MG/DL — SIGNIFICANT CHANGE UP (ref 0.2–1.2)
BLD GP AB SCN SERPL QL: SIGNIFICANT CHANGE UP
BUN SERPL-MCNC: 6 MG/DL — LOW (ref 10–20)
CALCIUM SERPL-MCNC: 7.7 MG/DL — LOW (ref 8.5–10.1)
CHLORIDE SERPL-SCNC: 108 MMOL/L — SIGNIFICANT CHANGE UP (ref 98–110)
CO2 SERPL-SCNC: 21 MMOL/L — SIGNIFICANT CHANGE UP (ref 17–32)
CREAT SERPL-MCNC: 0.5 MG/DL — LOW (ref 0.7–1.5)
CRP SERPL-MCNC: 9.13 MG/DL — HIGH (ref 0–0.4)
D DIMER BLD IA.RAPID-MCNC: 701 NG/ML DDU — HIGH (ref 0–230)
ERYTHROCYTE [SEDIMENTATION RATE] IN BLOOD: 60 MM/HR — HIGH (ref 0–20)
FIBRINOGEN PPP-MCNC: >700 MG/DL — HIGH (ref 204.4–570.6)
FSP PPP-MCNC: < 5 UG/ML — SIGNIFICANT CHANGE UP
GLUCOSE SERPL-MCNC: 73 MG/DL — SIGNIFICANT CHANGE UP (ref 70–99)
HCO3 BLDA-SCNC: 23 MMOL/L — SIGNIFICANT CHANGE UP (ref 21–29)
HCT VFR BLD CALC: 37.9 % — SIGNIFICANT CHANGE UP (ref 37–47)
HGB BLD-MCNC: 12.8 G/DL — SIGNIFICANT CHANGE UP (ref 12–16)
INR BLD: 1.23 RATIO — SIGNIFICANT CHANGE UP (ref 0.65–1.3)
MCHC RBC-ENTMCNC: 29.8 PG — SIGNIFICANT CHANGE UP (ref 27–31)
MCHC RBC-ENTMCNC: 33.8 G/DL — SIGNIFICANT CHANGE UP (ref 32–37)
MCV RBC AUTO: 88.3 FL — SIGNIFICANT CHANGE UP (ref 81–99)
MRSA PCR RESULT.: NEGATIVE — SIGNIFICANT CHANGE UP
NRBC # BLD: 0 /100 WBCS — SIGNIFICANT CHANGE UP (ref 0–0)
PCO2 BLDA: 44 MMHG — HIGH (ref 38–42)
PH BLDA: 7.32 — LOW (ref 7.38–7.42)
PLATELET # BLD AUTO: 200 K/UL — SIGNIFICANT CHANGE UP (ref 130–400)
PO2 BLDA: 75 MMHG — LOW (ref 78–95)
POTASSIUM SERPL-MCNC: 3.5 MMOL/L — SIGNIFICANT CHANGE UP (ref 3.5–5)
POTASSIUM SERPL-SCNC: 3.5 MMOL/L — SIGNIFICANT CHANGE UP (ref 3.5–5)
PROT SERPL-MCNC: 4.6 G/DL — LOW (ref 6–8)
PROTHROM AB SERPL-ACNC: 14.2 SEC — HIGH (ref 9.95–12.87)
RBC # BLD: 4.29 M/UL — SIGNIFICANT CHANGE UP (ref 4.2–5.4)
RBC # FLD: 14.1 % — SIGNIFICANT CHANGE UP (ref 11.5–14.5)
SAO2 % BLDA: 95 % — SIGNIFICANT CHANGE UP (ref 92–96)
SODIUM SERPL-SCNC: 142 MMOL/L — SIGNIFICANT CHANGE UP (ref 135–146)
WBC # BLD: 3.15 K/UL — LOW (ref 4.8–10.8)
WBC # FLD AUTO: 3.15 K/UL — LOW (ref 4.8–10.8)

## 2020-03-25 PROCEDURE — 71045 X-RAY EXAM CHEST 1 VIEW: CPT | Mod: 26

## 2020-03-25 PROCEDURE — 99291 CRITICAL CARE FIRST HOUR: CPT

## 2020-03-25 PROCEDURE — 93010 ELECTROCARDIOGRAM REPORT: CPT

## 2020-03-25 RX ORDER — POTASSIUM CHLORIDE 20 MEQ
40 PACKET (EA) ORAL ONCE
Refills: 0 | Status: DISCONTINUED | OUTPATIENT
Start: 2020-03-25 | End: 2020-03-25

## 2020-03-25 RX ORDER — MORPHINE SULFATE 50 MG/1
2 CAPSULE, EXTENDED RELEASE ORAL
Qty: 100 | Refills: 0 | Status: DISCONTINUED | OUTPATIENT
Start: 2020-03-25 | End: 2020-03-25

## 2020-03-25 RX ORDER — FENTANYL CITRATE 50 UG/ML
1 INJECTION INTRAVENOUS
Qty: 2500 | Refills: 0 | Status: DISCONTINUED | OUTPATIENT
Start: 2020-03-25 | End: 2020-03-26

## 2020-03-25 RX ORDER — POTASSIUM CHLORIDE 20 MEQ
40 PACKET (EA) ORAL ONCE
Refills: 0 | Status: COMPLETED | OUTPATIENT
Start: 2020-03-25 | End: 2020-03-25

## 2020-03-25 RX ADMIN — Medication 200 MILLIGRAM(S): at 17:39

## 2020-03-25 RX ADMIN — Medication 650 MILLIGRAM(S): at 15:20

## 2020-03-25 RX ADMIN — CHLORHEXIDINE GLUCONATE 1 APPLICATION(S): 213 SOLUTION TOPICAL at 04:46

## 2020-03-25 RX ADMIN — ENOXAPARIN SODIUM 65 MILLIGRAM(S): 100 INJECTION SUBCUTANEOUS at 17:38

## 2020-03-25 RX ADMIN — AMPICILLIN SODIUM AND SULBACTAM SODIUM 200 GRAM(S): 250; 125 INJECTION, POWDER, FOR SUSPENSION INTRAMUSCULAR; INTRAVENOUS at 13:19

## 2020-03-25 RX ADMIN — CHLORHEXIDINE GLUCONATE 15 MILLILITER(S): 213 SOLUTION TOPICAL at 17:39

## 2020-03-25 RX ADMIN — AMPICILLIN SODIUM AND SULBACTAM SODIUM 200 GRAM(S): 250; 125 INJECTION, POWDER, FOR SUSPENSION INTRAMUSCULAR; INTRAVENOUS at 17:38

## 2020-03-25 RX ADMIN — Medication 40 MILLIEQUIVALENT(S): at 13:18

## 2020-03-25 RX ADMIN — AMPICILLIN SODIUM AND SULBACTAM SODIUM 200 GRAM(S): 250; 125 INJECTION, POWDER, FOR SUSPENSION INTRAMUSCULAR; INTRAVENOUS at 04:45

## 2020-03-25 RX ADMIN — Medication 200 MILLIGRAM(S): at 04:46

## 2020-03-25 RX ADMIN — ENOXAPARIN SODIUM 65 MILLIGRAM(S): 100 INJECTION SUBCUTANEOUS at 04:45

## 2020-03-25 RX ADMIN — PANTOPRAZOLE SODIUM 40 MILLIGRAM(S): 20 TABLET, DELAYED RELEASE ORAL at 13:18

## 2020-03-25 RX ADMIN — CHLORHEXIDINE GLUCONATE 15 MILLILITER(S): 213 SOLUTION TOPICAL at 04:45

## 2020-03-25 NOTE — PROGRESS NOTE ADULT - ASSESSMENT
· Assessment		  71 y/o female with pmh of a-fib on Eliquis , c/o fever, non-productive cough, body aches and decreased appetite x 7 days. No known sick contacts. No diarrhea. No ABD pain. No H/A, no neck pain. No dysuria/frequency/urgency. Presented with sob  No hemoptysis.  Patient admitted to internal medicine service for acute hypoxemic respiratory failure 2/2 covid19 .Patient intubated in CEU     IMPRESSION:  #Severe septic shock ( ( T 102.6, WBC 3.1, on levo ) secondary to COVID19 with no end organ damage  -s/p Toci 3/24  -CXR progressively worse since admission with bibasilar opacities  -procal 0.06 which goes against a bacterial process  -no ARF  -ALT mild elevation  -BC 3/22 NG    RECOMMENDATIONS:  -inflammatory markers ( along with trops )  -HIV test  -HCQ for 5 days. monitor QTc  -d/c unasyn  -prognosis is extremely poor given the imminent possibility of CRS

## 2020-03-25 NOTE — PROGRESS NOTE ADULT - SUBJECTIVE AND OBJECTIVE BOX
CLIFTON LYNN 70y Female  MRN#: [id number]       SUBJECTIVE  Patient is a 70y old Female who presents with a chief complaint of covid r/o given fevers, non productive cough (25 Mar 2020 08:58)  Currently admitted to medicine with the primary diagnosis of Sepsis  Today is hospital day 3d, and this morning she is intubated, sedated, withdraws from pain   OBJECTIVE  PAST MEDICAL & SURGICAL HISTORY  Afib    ALLERGIES:  Originally Entered as [Unknown] reaction to [green pepper] (Unknown)  Originally Entered as [Unknown] reaction to [pepper] (Unknown)  sulfa drugs (Unknown)    MEDICATIONS:  STANDING MEDICATIONS  ampicillin/sulbactam  IVPB 3 Gram(s) IV Intermittent every 6 hours  chlorhexidine 0.12% Liquid 15 milliLiter(s) Oral Mucosa two times a day  chlorhexidine 4% Liquid 1 Application(s) Topical <User Schedule>  enoxaparin Injectable 65 milliGRAM(s) SubCutaneous every 12 hours  fentaNYL   Infusion. 1 MICROgram(s)/kG/Hr IV Continuous <Continuous>  hydroxychloroquine 200 milliGRAM(s) Oral every 12 hours  hydroxychloroquine   Oral   midazolam Infusion 0.08 mG/kG/Hr IV Continuous <Continuous>  norepinephrine Infusion 0.05 MICROgram(s)/kG/Min IV Continuous <Continuous>  pantoprazole   Suspension 40 milliGRAM(s) Oral daily    PRN MEDICATIONS  acetaminophen   Tablet .. 650 milliGRAM(s) Oral every 6 hours PRN      VITAL SIGNS: Last 24 Hours  T(C): 36.8 (25 Mar 2020 08:00), Max: 39.2 (24 Mar 2020 12:00)  T(F): 98.2 (25 Mar 2020 08:00), Max: 102.6 (24 Mar 2020 12:00)  HR: 78 (25 Mar 2020 10:15) (56 - 98)  BP: 103/47 (25 Mar 2020 10:00) (80/43 - 136/53)  BP(mean): 66 (25 Mar 2020 10:00) (53 - 88)  RR: 24 (25 Mar 2020 10:15) (19 - 46)  SpO2: 97% (25 Mar 2020 10:15) (93% - 100%)    LABS:                        12.8   3.15  )-----------( 200      ( 25 Mar 2020 04:45 )             37.9     03-25    142  |  108  |  6<L>  ----------------------------<  73  3.5   |  21  |  0.5<L>    Ca    7.7<L>      25 Mar 2020 04:45  Mg     2.0     03-24    TPro  4.6<L>  /  Alb  2.5<L>  /  TBili  0.3  /  DBili  x   /  AST  61<H>  /  ALT  36  /  AlkPhos  82  03-25    PT/INR - ( 25 Mar 2020 04:45 )   PT: 14.20 sec;   INR: 1.23 ratio         PTT - ( 25 Mar 2020 04:45 )  PTT:38.2 sec    ABG - ( 25 Mar 2020 04:11 )  pH, Arterial: 7.32  pH, Blood: x     /  pCO2: 44    /  pO2: 75    / HCO3: 23    / Base Excess: -3.4  /  SaO2: 95        Sedimentation Rate, Erythrocyte: 60 mm/Hr <H> (03-25-20 @ 04:45)  Culture - Blood (collected 22 Mar 2020 13:15)  Source: .Blood Blood-Peripheral  Preliminary Report (23 Mar 2020 20:01):    No growth to date.    Culture - Blood (collected 22 Mar 2020 13:15)  Source: .Blood Blood-Peripheral  Preliminary Report (23 Mar 2020 20:01):    No growth to date.      CARDIAC MARKERS ( 24 Mar 2020 07:47 )  x     / x     / 146 U/L / x     / x          RADIOLOGY:    PHYSICAL EXAM:    GENERAL: intubated/sedated  HEENT:  Atraumatic, Normocephalic. EOMI, PERRLA, conjunctiva and sclera clear, No JVD  PULMONARY: Clear to auscultation bilaterally; No wheeze  CARDIOVASCULAR: Regular rate and rhythm; No murmurs, rubs, or gallops  GASTROINTESTINAL: Soft, Nontender, Nondistended; Bowel sounds present  MUSCULOSKELETAL:  2+ Peripheral Pulses, No clubbing, cyanosis, or edema  NEUROLOGY: non-focal  SKIN: No rash    ASSESSMENT & PLAN  71 y/o female with pmh of a-fib, c/o fever, non-productive cough, body aches and decreased appetite x 7 days. No known sick contacts. No diarrhea. No ABD pain.   Patient was found to be covid +  She required intubated on 3/24/2020 for hypoxic respiratory failure,     # hypoxic respiratory failure due to COVID -19 +, requiring intubation  CXR: bilateral reticular infiltrates, read as viral PNA in proper setting  Flu A/B/RSV negative,  - on Unasyn for possible aspiration PNA  - s/p 1 dose of toculizumab  - on plaqunil (start 3/23)  - monitor QTs. Qt [id number]. Keep Mg>2 and k>4  - on low dose Levophed    # Chronic Afib  - now in NSR  - BB stopped as patient is requiring pressors   - rate is still well controlled  - on Lovenox therapeutic dose    Diet: tube feeds  Lines: left IJ  Garcia present  DVT prophylaxis: Lovenox theraputic  Ambulation: bedrest

## 2020-03-25 NOTE — PROGRESS NOTE ADULT - SUBJECTIVE AND OBJECTIVE BOX
Patient is a 70y old  Female who presents with a chief complaint of covid r/o given fevers, non productive cough (24 Mar 2020 16:13)        Over Night Events:  Remains critically ill on MV.  ON Levophed.          ROS:     CONSTITUTIONAL:   fever   no chills.  no weight gain   no weight loss    EYES:   no discharge,   no pain  no redness,   no visual changes.    ENT:   Ears: no ear pain and no hearing problems.  Nose: no nasal congestion and no nasal drainage.  Mouth/Throat: no dysphagia,  no hoarseness and no throat pain.  Neck: no lumps, no pain, no stiffness and no swollen glands.     CARDIOVASCULAR:   Per HPI    RESPIRATORY:  Per HPI    GASTROINTESTINAL:   no abdominal pain,   no constipation,   no diarrhea,   no vomiting.    GENITOURINARY:  no dysuria,   no frequency,   no urgency  no hematuria.    MUSCULOSKELETAL:   no back pain,   no musculoskeletal pain,  no weakness.    SKIN:   no jaundice,   no lesions,   no pruritis,   no rashes.    NEURO:   no loss of consciousness,   no gait abnormality,   no headache,   no sensory deficits,   no weakness.    PSYCHIATRIC:   no known mental health issues  no anxiety  no depression    ALLERGIC/IMMUNOLOGIC:   No active allergic or immunologic issues        PHYSICAL EXAM    ICU Vital Signs Last 24 Hrs  T(C): 36.8 (25 Mar 2020 08:00), Max: 39.2 (24 Mar 2020 12:00)  T(F): 98.2 (25 Mar 2020 08:00), Max: 102.6 (24 Mar 2020 12:00)  HR: 70 (25 Mar 2020 08:00) (56 - 98)  BP: 124/56 (25 Mar 2020 08:00) (80/43 - 136/53)  BP(mean): 75 (25 Mar 2020 08:00) (53 - 88)  ABP: --  ABP(mean): --  RR: 19 (25 Mar 2020 08:00) (14 - 46)  SpO2: 100% (25 Mar 2020 08:00) (93% - 100%)      CONSTITUTIONAL:   Ill appearing.  Well nourished.  NAD    ENT:   Airway patent,   Mouth with normal mucosa.   No thrush    EYES:   Pupils equal,   Round and reactive to light.    CARDIAC:   Normal rate,   Regular rhythm.    No edema      Vascular:  Normal systolic impulse  No Carotid bruits    RESPIRATORY:   No wheezing  Bilateral BS  Normal chest expansion  Not tachypneic,  No use of accessory muscles    GASTROINTESTINAL:  Abdomen soft,   Non-tender,   No guarding,   + BS    GENITOURINARY  normal genitalia for sex  no edema    MUSCULOSKELETAL:   Range of motion is not limited,  No muscle or joint tenderness  No clubbing, cyanosis    NEUROLOGICAL:   Sedated.  Follows commands   No motor  deficits.      SKIN:   Skin normal color for race,   Warm and dry and intact.   No evidence of rash.    PSYCHIATRIC:   Normal mood and affect.   No apparent risk to self or others.    HEME LYMPH:   No cervical  lymphadenopathy.  no inguinal lymphadenopathy      03-24-20 @ 07:01  -  03-25-20 @ 07:00  --------------------------------------------------------  IN:    fentaNYL Infusion.: 153.6 mL    IV PiggyBack: 300 mL    lactated ringers.: 1000 mL    midazolam Infusion: 58.2 mL    norepinephrine Infusion: 15 mL    norepinephrine Infusion: 112.7 mL  Total IN: 1639.5 mL    OUT:    Indwelling Catheter - Urethral: 2200 mL  Total OUT: 2200 mL    Total NET: -560.5 mL      03-25-20 @ 07:01  -  03-25-20 @ 08:54  --------------------------------------------------------  IN:    fentaNYL Infusion.: 9.8 mL    IV PiggyBack: 3 mL    midazolam Infusion: 3.2 mL    norepinephrine Infusion: 9.8 mL  Total IN: 25.8 mL    OUT:    Indwelling Catheter - Urethral: 50 mL  Total OUT: 50 mL    Total NET: -24.2 mL          LABS:                            12.8   3.15  )-----------( 200      ( 25 Mar 2020 04:45 )             37.9                                               03-25    142  |  108  |  6<L>  ----------------------------<  73  3.5   |  21  |  0.5<L>    Ca    7.7<L>      25 Mar 2020 04:45  Mg     2.0     03-24    TPro  4.6<L>  /  Alb  2.5<L>  /  TBili  0.3  /  DBili  x   /  AST  61<H>  /  ALT  36  /  AlkPhos  82  03-25      PT/INR - ( 25 Mar 2020 04:45 )   PT: 14.20 sec;   INR: 1.23 ratio         PTT - ( 25 Mar 2020 04:45 )  PTT:38.2 sec                                           CARDIAC MARKERS ( 24 Mar 2020 07:47 )  x     / x     / 146 U/L / x     / x                                                LIVER FUNCTIONS - ( 25 Mar 2020 04:45 )  Alb: 2.5 g/dL / Pro: 4.6 g/dL / ALK PHOS: 82 U/L / ALT: 36 U/L / AST: 61 U/L / GGT: x                                                  Culture - Blood (collected 22 Mar 2020 13:15)  Source: .Blood Blood-Peripheral  Preliminary Report (23 Mar 2020 20:01):    No growth to date.    Culture - Blood (collected 22 Mar 2020 13:15)  Source: .Blood Blood-Peripheral  Preliminary Report (23 Mar 2020 20:01):    No growth to date.                                                   Mode: AC/ CMV (Assist Control/ Continuous Mandatory Ventilation)  RR (machine): 20  TV (machine): 400  FiO2: 60  PEEP: 12.5  ITime: 1  MAP: 14  PIP: 24                                      ABG - ( 25 Mar 2020 04:11 )  pH, Arterial: 7.32  pH, Blood: x     /  pCO2: 44    /  pO2: 75    / HCO3: 23    / Base Excess: -3.4  /  SaO2: 95                  MEDICATIONS  (STANDING):  ampicillin/sulbactam  IVPB 3 Gram(s) IV Intermittent every 6 hours  chlorhexidine 0.12% Liquid 15 milliLiter(s) Oral Mucosa two times a day  chlorhexidine 4% Liquid 1 Application(s) Topical <User Schedule>  enoxaparin Injectable 65 milliGRAM(s) SubCutaneous every 12 hours  fentaNYL   Infusion. 1 MICROgram(s)/kG/Hr (6.58 mL/Hr) IV Continuous <Continuous>  hydroxychloroquine 200 milliGRAM(s) Oral every 12 hours  hydroxychloroquine   Oral   lactated ringers. 1000 milliLiter(s) (1000 mL/Hr) IV Continuous <Continuous>  midazolam Infusion 0.08 mG/kG/Hr (5.26 mL/Hr) IV Continuous <Continuous>  norepinephrine Infusion 0.05 MICROgram(s)/kG/Min (6.14 mL/Hr) IV Continuous <Continuous>  pantoprazole   Suspension 40 milliGRAM(s) Oral daily    MEDICATIONS  (PRN):  acetaminophen   Tablet .. 650 milliGRAM(s) Oral every 6 hours PRN Temp greater or equal to 38C (100.4F)      New X-rays reviewed:                                                                                  ECHO    CXR interpreted by me:  ET OG OK>  Bibasilar infiltrates

## 2020-03-25 NOTE — PROGRESS NOTE ADULT - SUBJECTIVE AND OBJECTIVE BOX
CLIFTON LYNN  70y, Female    All available historical data reviewed    OVERNIGHT EVENTS:    none  ROS:  fevers    VITALS:  FiO2 60%  sedated  no diarrhea  no pressors    TESTS & MEASUREMENTS:                        12.8   3.15  )-----------( 200      ( 25 Mar 2020 04:45 )             37.9     03-25    142  |  108  |  6<L>  ----------------------------<  73  3.5   |  21  |  0.5<L>    Ca    7.7<L>      25 Mar 2020 04:45  Mg     2.0     03-24    TPro  4.6<L>  /  Alb  2.5<L>  /  TBili  0.3  /  DBili  x   /  AST  61<H>  /  ALT  36  /  AlkPhos  82  03-25    LIVER FUNCTIONS - ( 25 Mar 2020 04:45 )  Alb: 2.5 g/dL / Pro: 4.6 g/dL / ALK PHOS: 82 U/L / ALT: 36 U/L / AST: 61 U/L / GGT: x             Culture - Blood (collected 03-22-20 @ 13:15)  Source: .Blood Blood-Peripheral  Preliminary Report (03-23-20 @ 20:01):    No growth to date.    Culture - Blood (collected 03-22-20 @ 13:15)  Source: .Blood Blood-Peripheral  Preliminary Report (03-23-20 @ 20:01):    No growth to date.            RADIOLOGY & ADDITIONAL TESTS:  Personal review of radiological diagnostics performed  Echo and EKG results noted when applicable.     MEDICATIONS:  acetaminophen   Tablet .. 650 milliGRAM(s) Oral every 6 hours PRN  ampicillin/sulbactam  IVPB 3 Gram(s) IV Intermittent every 6 hours  chlorhexidine 0.12% Liquid 15 milliLiter(s) Oral Mucosa two times a day  chlorhexidine 4% Liquid 1 Application(s) Topical <User Schedule>  enoxaparin Injectable 65 milliGRAM(s) SubCutaneous every 12 hours  fentaNYL   Infusion. 1 MICROgram(s)/kG/Hr IV Continuous <Continuous>  hydroxychloroquine 200 milliGRAM(s) Oral every 12 hours  hydroxychloroquine   Oral   midazolam Infusion 0.08 mG/kG/Hr IV Continuous <Continuous>  norepinephrine Infusion 0.05 MICROgram(s)/kG/Min IV Continuous <Continuous>  pantoprazole   Suspension 40 milliGRAM(s) Oral daily      ANTIBIOTICS:  ampicillin/sulbactam  IVPB 3 Gram(s) IV Intermittent every 6 hours  hydroxychloroquine 200 milliGRAM(s) Oral every 12 hours  hydroxychloroquine   Oral

## 2020-03-25 NOTE — PROGRESS NOTE ADULT - ASSESSMENT
IMPRESSION:    Acute Hypoxemic Respiratory Failure 2/2 COVID 19  ARDS  H/O Afib on Eliquis    PLAN:    CNS: SAT.      HEENT: ET care ,oral care    PULMONARY:  HOB @ 45 degrees. Aspiration precautions. Vent changes as follows: ARDS network protocol. Dec fio2 50%.  RR 24.  PEEP 12.5     CARDIOVASCULAR: Avoid volume overload , ECHO. DC Wean Levophed     GI: GI prophylaxis. f/u LFTs Feeding : OG feeding     RENAL:  Follow up lytes.  Correct as needed    INFECTIOUS DISEASE: Follow up cultures; Hydroxychloroquine.  Unasyn for now.  Nasal MRSA, Procalcitonin.       HEMATOLOGICAL:  DVT prophylaxis. Can start LMWH     ENDOCRINE:  Follow up FS.  Insulin protocol if needed.    MUSCULOSKELETAL: Bedrest    Lines: TLC     Disposition: MICU monitoring

## 2020-03-26 LAB
ANION GAP SERPL CALC-SCNC: 15 MMOL/L — HIGH (ref 7–14)
ANISOCYTOSIS BLD QL: SLIGHT — SIGNIFICANT CHANGE UP
BASE EXCESS BLDA CALC-SCNC: -6.1 MMOL/L — LOW (ref -2–2)
BASOPHILS # BLD AUTO: 0 K/UL — SIGNIFICANT CHANGE UP (ref 0–0.2)
BASOPHILS NFR BLD AUTO: 0 % — SIGNIFICANT CHANGE UP (ref 0–1)
BUN SERPL-MCNC: 10 MG/DL — SIGNIFICANT CHANGE UP (ref 10–20)
CALCIUM SERPL-MCNC: 8 MG/DL — LOW (ref 8.5–10.1)
CHLORIDE SERPL-SCNC: 111 MMOL/L — HIGH (ref 98–110)
CO2 SERPL-SCNC: 19 MMOL/L — SIGNIFICANT CHANGE UP (ref 17–32)
CREAT SERPL-MCNC: 0.8 MG/DL — SIGNIFICANT CHANGE UP (ref 0.7–1.5)
EOSINOPHIL # BLD AUTO: 0 K/UL — SIGNIFICANT CHANGE UP (ref 0–0.7)
EOSINOPHIL NFR BLD AUTO: 0 % — SIGNIFICANT CHANGE UP (ref 0–8)
GIANT PLATELETS BLD QL SMEAR: PRESENT — SIGNIFICANT CHANGE UP
GLUCOSE SERPL-MCNC: 192 MG/DL — HIGH (ref 70–99)
HCO3 BLDA-SCNC: 20 MMOL/L — LOW (ref 21–29)
HCT VFR BLD CALC: 39.8 % — SIGNIFICANT CHANGE UP (ref 37–47)
HGB BLD-MCNC: 12.8 G/DL — SIGNIFICANT CHANGE UP (ref 12–16)
HIV 1+2 AB+HIV1 P24 AG SERPL QL IA: SIGNIFICANT CHANGE UP
LYMPHOCYTES # BLD AUTO: 0.15 K/UL — LOW (ref 1.2–3.4)
LYMPHOCYTES # BLD AUTO: 2.6 % — LOW (ref 20.5–51.1)
MAGNESIUM SERPL-MCNC: 2.1 MG/DL — SIGNIFICANT CHANGE UP (ref 1.8–2.4)
MANUAL SMEAR VERIFICATION: SIGNIFICANT CHANGE UP
MCHC RBC-ENTMCNC: 29 PG — SIGNIFICANT CHANGE UP (ref 27–31)
MCHC RBC-ENTMCNC: 32.2 G/DL — SIGNIFICANT CHANGE UP (ref 32–37)
MCV RBC AUTO: 90.2 FL — SIGNIFICANT CHANGE UP (ref 81–99)
MICROCYTES BLD QL: SLIGHT — SIGNIFICANT CHANGE UP
MONOCYTES # BLD AUTO: 0.15 K/UL — SIGNIFICANT CHANGE UP (ref 0.1–0.6)
MONOCYTES NFR BLD AUTO: 2.6 % — SIGNIFICANT CHANGE UP (ref 1.7–9.3)
NEUTROPHILS # BLD AUTO: 5.61 K/UL — SIGNIFICANT CHANGE UP (ref 1.4–6.5)
NEUTROPHILS NFR BLD AUTO: 89.5 % — HIGH (ref 42.2–75.2)
NEUTS BAND # BLD: 5.3 % — SIGNIFICANT CHANGE UP (ref 0–6)
PCO2 BLDA: 43 MMHG — HIGH (ref 38–42)
PH BLDA: 7.28 — LOW (ref 7.38–7.42)
PLAT MORPH BLD: NORMAL — SIGNIFICANT CHANGE UP
PLATELET # BLD AUTO: 246 K/UL — SIGNIFICANT CHANGE UP (ref 130–400)
PO2 BLDA: 68 MMHG — LOW (ref 78–95)
POIKILOCYTOSIS BLD QL AUTO: SIGNIFICANT CHANGE UP
POLYCHROMASIA BLD QL SMEAR: SIGNIFICANT CHANGE UP
POTASSIUM SERPL-MCNC: 4.3 MMOL/L — SIGNIFICANT CHANGE UP (ref 3.5–5)
POTASSIUM SERPL-SCNC: 4.3 MMOL/L — SIGNIFICANT CHANGE UP (ref 3.5–5)
PROCALCITONIN SERPL-MCNC: 0.11 NG/ML — HIGH (ref 0.02–0.1)
RBC # BLD: 4.41 M/UL — SIGNIFICANT CHANGE UP (ref 4.2–5.4)
RBC # FLD: 14.5 % — SIGNIFICANT CHANGE UP (ref 11.5–14.5)
RBC BLD AUTO: ABNORMAL
SAO2 % BLDA: 93 % — SIGNIFICANT CHANGE UP (ref 92–96)
SMUDGE CELLS # BLD: PRESENT — SIGNIFICANT CHANGE UP
SODIUM SERPL-SCNC: 145 MMOL/L — SIGNIFICANT CHANGE UP (ref 135–146)
WBC # BLD: 5.92 K/UL — SIGNIFICANT CHANGE UP (ref 4.8–10.8)
WBC # FLD AUTO: 5.92 K/UL — SIGNIFICANT CHANGE UP (ref 4.8–10.8)

## 2020-03-26 PROCEDURE — 93010 ELECTROCARDIOGRAM REPORT: CPT

## 2020-03-26 PROCEDURE — 71045 X-RAY EXAM CHEST 1 VIEW: CPT | Mod: 26

## 2020-03-26 RX ORDER — MORPHINE SULFATE 50 MG/1
2 CAPSULE, EXTENDED RELEASE ORAL
Qty: 100 | Refills: 0 | Status: DISCONTINUED | OUTPATIENT
Start: 2020-03-26 | End: 2020-03-26

## 2020-03-26 RX ORDER — MORPHINE SULFATE 50 MG/1
2 CAPSULE, EXTENDED RELEASE ORAL
Qty: 100 | Refills: 0 | Status: DISCONTINUED | OUTPATIENT
Start: 2020-03-26 | End: 2020-03-29

## 2020-03-26 RX ADMIN — PANTOPRAZOLE SODIUM 40 MILLIGRAM(S): 20 TABLET, DELAYED RELEASE ORAL at 11:23

## 2020-03-26 RX ADMIN — ENOXAPARIN SODIUM 65 MILLIGRAM(S): 100 INJECTION SUBCUTANEOUS at 19:19

## 2020-03-26 RX ADMIN — AMPICILLIN SODIUM AND SULBACTAM SODIUM 200 GRAM(S): 250; 125 INJECTION, POWDER, FOR SUSPENSION INTRAMUSCULAR; INTRAVENOUS at 05:33

## 2020-03-26 RX ADMIN — CHLORHEXIDINE GLUCONATE 15 MILLILITER(S): 213 SOLUTION TOPICAL at 18:55

## 2020-03-26 RX ADMIN — CHLORHEXIDINE GLUCONATE 15 MILLILITER(S): 213 SOLUTION TOPICAL at 05:34

## 2020-03-26 RX ADMIN — Medication 200 MILLIGRAM(S): at 05:36

## 2020-03-26 RX ADMIN — AMPICILLIN SODIUM AND SULBACTAM SODIUM 200 GRAM(S): 250; 125 INJECTION, POWDER, FOR SUSPENSION INTRAMUSCULAR; INTRAVENOUS at 00:30

## 2020-03-26 RX ADMIN — Medication 650 MILLIGRAM(S): at 07:13

## 2020-03-26 RX ADMIN — AMPICILLIN SODIUM AND SULBACTAM SODIUM 200 GRAM(S): 250; 125 INJECTION, POWDER, FOR SUSPENSION INTRAMUSCULAR; INTRAVENOUS at 19:19

## 2020-03-26 RX ADMIN — Medication 650 MILLIGRAM(S): at 12:40

## 2020-03-26 RX ADMIN — CHLORHEXIDINE GLUCONATE 1 APPLICATION(S): 213 SOLUTION TOPICAL at 05:33

## 2020-03-26 RX ADMIN — ENOXAPARIN SODIUM 65 MILLIGRAM(S): 100 INJECTION SUBCUTANEOUS at 05:34

## 2020-03-26 RX ADMIN — Medication 200 MILLIGRAM(S): at 19:00

## 2020-03-26 RX ADMIN — Medication 650 MILLIGRAM(S): at 05:34

## 2020-03-26 RX ADMIN — MORPHINE SULFATE 2 MG/HR: 50 CAPSULE, EXTENDED RELEASE ORAL at 16:28

## 2020-03-26 RX ADMIN — AMPICILLIN SODIUM AND SULBACTAM SODIUM 200 GRAM(S): 250; 125 INJECTION, POWDER, FOR SUSPENSION INTRAMUSCULAR; INTRAVENOUS at 11:23

## 2020-03-26 RX ADMIN — Medication 6.14 MICROGRAM(S)/KG/MIN: at 20:26

## 2020-03-26 RX ADMIN — Medication 650 MILLIGRAM(S): at 12:58

## 2020-03-26 NOTE — PROGRESS NOTE ADULT - SUBJECTIVE AND OBJECTIVE BOX
CLIFTON LYNN  70y, Female    All available historical data reviewed    OVERNIGHT EVENTS:    none  ROS:  on vent, FiO2 60%  sedated    VITALS:  T(F): 101, Max: 101.4 (03-26-20 @ 04:00)  HR: 98  BP: 116/57  RR: 23Vital Signs Last 24 Hrs  T(C): 38.3 (26 Mar 2020 08:00), Max: 38.6 (26 Mar 2020 04:00)  T(F): 101 (26 Mar 2020 08:00), Max: 101.4 (26 Mar 2020 04:00)  HR: 98 (26 Mar 2020 10:30) (76 - 116)  BP: 116/57 (26 Mar 2020 10:30) (72/49 - 166/75)  BP(mean): 80 (26 Mar 2020 10:30) (47 - 107)  RR: 23 (26 Mar 2020 10:30) (23 - 65)  SpO2: 99% (26 Mar 2020 10:30) (82% - 100%)    TESTS & MEASUREMENTS:                        12.8   5.92  )-----------( 246      ( 26 Mar 2020 05:00 )             39.8     03-26    145  |  111<H>  |  10  ----------------------------<  192<H>  4.3   |  19  |  0.8    Ca    8.0<L>      26 Mar 2020 05:00  Mg     2.1     03-26    TPro  4.6<L>  /  Alb  2.5<L>  /  TBili  0.3  /  DBili  x   /  AST  61<H>  /  ALT  36  /  AlkPhos  82  03-25    LIVER FUNCTIONS - ( 25 Mar 2020 04:45 )  Alb: 2.5 g/dL / Pro: 4.6 g/dL / ALK PHOS: 82 U/L / ALT: 36 U/L / AST: 61 U/L / GGT: x             Culture - Blood (collected 03-22-20 @ 13:15)  Source: .Blood Blood-Peripheral  Preliminary Report (03-23-20 @ 20:01):    No growth to date.    Culture - Blood (collected 03-22-20 @ 13:15)  Source: .Blood Blood-Peripheral  Preliminary Report (03-23-20 @ 20:01):    No growth to date.            RADIOLOGY & ADDITIONAL TESTS:  Personal review of radiological diagnostics performed  Echo and EKG results noted when applicable.     MEDICATIONS:  acetaminophen   Tablet .. 650 milliGRAM(s) Oral every 6 hours PRN  ampicillin/sulbactam  IVPB 3 Gram(s) IV Intermittent every 6 hours  chlorhexidine 0.12% Liquid 15 milliLiter(s) Oral Mucosa two times a day  chlorhexidine 4% Liquid 1 Application(s) Topical <User Schedule>  enoxaparin Injectable 65 milliGRAM(s) SubCutaneous every 12 hours  fentaNYL   Infusion. 1 MICROgram(s)/kG/Hr IV Continuous <Continuous>  hydroxychloroquine 200 milliGRAM(s) Oral every 12 hours  hydroxychloroquine   Oral   midazolam Infusion 0.08 mG/kG/Hr IV Continuous <Continuous>  norepinephrine Infusion 0.05 MICROgram(s)/kG/Min IV Continuous <Continuous>  pantoprazole   Suspension 40 milliGRAM(s) Oral daily      ANTIBIOTICS:  ampicillin/sulbactam  IVPB 3 Gram(s) IV Intermittent every 6 hours  hydroxychloroquine 200 milliGRAM(s) Oral every 12 hours  hydroxychloroquine   Oral

## 2020-03-26 NOTE — PROGRESS NOTE ADULT - SUBJECTIVE AND OBJECTIVE BOX
CLIFTON LYNN 70y Female  MRN#: [id number]       SUBJECTIVE  Patient is a 70y old Female who presents with a chief complaint of covid r/o given fevers, non productive cough (26 Mar 2020 10:51)  Currently admitted to medicine with the primary diagnosis of Sepsis  Today is hospital day 4d, and this morning she is intubated, sedated.   No events over night    OBJECTIVE  PAST MEDICAL & SURGICAL HISTORY  Afib    ALLERGIES:  Originally Entered as [Unknown] reaction to [green pepper] (Unknown)  Originally Entered as [Unknown] reaction to [pepper] (Unknown)  sulfa drugs (Unknown)    MEDICATIONS:  STANDING MEDICATIONS  ampicillin/sulbactam  IVPB 3 Gram(s) IV Intermittent every 6 hours  chlorhexidine 0.12% Liquid 15 milliLiter(s) Oral Mucosa two times a day  chlorhexidine 4% Liquid 1 Application(s) Topical <User Schedule>  enoxaparin Injectable 65 milliGRAM(s) SubCutaneous every 12 hours  fentaNYL   Infusion. 1 MICROgram(s)/kG/Hr IV Continuous <Continuous>  hydroxychloroquine 200 milliGRAM(s) Oral every 12 hours  hydroxychloroquine   Oral   midazolam Infusion 0.08 mG/kG/Hr IV Continuous <Continuous>  morphine  Infusion 2 mG/Hr IV Continuous <Continuous>  norepinephrine Infusion 0.05 MICROgram(s)/kG/Min IV Continuous <Continuous>  pantoprazole   Suspension 40 milliGRAM(s) Oral daily    PRN MEDICATIONS  acetaminophen   Tablet .. 650 milliGRAM(s) Oral every 6 hours PRN      VITAL SIGNS: Last 24 Hours  T(C): 38.3 (26 Mar 2020 08:00), Max: 38.6 (26 Mar 2020 04:00)  T(F): 101 (26 Mar 2020 08:00), Max: 101.4 (26 Mar 2020 04:00)  HR: 98 (26 Mar 2020 10:30) (80 - 116)  BP: 116/57 (26 Mar 2020 10:30) (72/49 - 166/75)  BP(mean): 80 (26 Mar 2020 10:30) (47 - 107)  RR: 23 (26 Mar 2020 10:30) (23 - 65)  SpO2: 99% (26 Mar 2020 10:30) (82% - 100%)    LABS:                        12.8   5.92  )-----------( 246      ( 26 Mar 2020 05:00 )             39.8     03-26    145  |  111<H>  |  10  ----------------------------<  192<H>  4.3   |  19  |  0.8    Ca    8.0<L>      26 Mar 2020 05:00  Mg     2.1     03-26    TPro  4.6<L>  /  Alb  2.5<L>  /  TBili  0.3  /  DBili  x   /  AST  61<H>  /  ALT  36  /  AlkPhos  82  03-25    PT/INR - ( 25 Mar 2020 04:45 )   PT: 14.20 sec;   INR: 1.23 ratio         PTT - ( 25 Mar 2020 04:45 )  PTT:38.2 sec    ABG - ( 26 Mar 2020 03:43 )  pH, Arterial: 7.28  pH, Blood: x     /  pCO2: 43    /  pO2: 68    / HCO3: 20    / Base Excess: -6.1  /  SaO2: 93        PHYSICAL EXAM:    GENERAL: intubated/sedated  HEENT:  Atraumatic, Normocephalic. EOMI, PERRLA, conjunctiva and sclera clear, No JVD  PULMONARY: Clear to auscultation bilaterally; No wheeze  CARDIOVASCULAR: Regular rate and rhythm; No murmurs, rubs, or gallops  GASTROINTESTINAL: Soft, Nontender, Nondistended; Bowel sounds present  MUSCULOSKELETAL:  2+ Peripheral Pulses, No clubbing, cyanosis, or edema  NEUROLOGY: non-focal  SKIN: No rash      ASSESSMENT & PLAN    71 y/o female with pmh of a-fib, c/o fever, non-productive cough, body aches and decreased appetite x 7 days. No known sick contacts. No diarrhea. No ABD pain.   Patient was found to be covid +  She required intubated on 3/24/2020 for hypoxic respiratory failure.     # hypoxic respiratory failure due to COVID -19 +, requiring intubation  CXR: bilateral reticular infiltrates, read as viral PNA in proper setting  Flu A/B/RSV negative,  - on Unasyn for possible aspiration PNA (start on 3/24) for 5 days  - s/p 1 dose of toculizumab  - on plaqunil (start 3/23)  - monitor QTs. Qt [id number]. Keep Mg>2 and k>4  - on low dose Levophed    # Chronic Afib  - now in NSR  - BB stopped as patient is requiring pressors   - rate is still well controlled  - on Lovenox therapeutic dose    # mild hypernatremia  - will start free water 300mg q 6hrs     Diet: tube feeds  Lines: left IJ  Garcia present  DVT prophylaxis: Lovenox theraputic  Ambulation: bedrest

## 2020-03-26 NOTE — PROGRESS NOTE ADULT - ASSESSMENT
69 y/o female with pmh of a-fib on Eliquis , c/o fever, non-productive cough, body aches and decreased appetite x 7 days. No known sick contacts. No diarrhea. No ABD pain. No H/A, no neck pain. No dysuria/frequency/urgency. Presented with sob  No hemoptysis.  Patient admitted to internal medicine service for acute hypoxemic respiratory failure 2/2 covid19 .Patient intubated in CEU     IMPRESSION:  #Severe septic shock ( ( T 101.9 , on levo ) secondary to COVID19 with no end organ damage  -s/p Toci 3/24  -CXR progressively worse since admission with bibasilar opacities  -procal 0.06 which goes against a bacterial process  -no ARF  -ALT mild elevation  -BC 3/22 NG  -inflammatory markers increasing    RECOMMENDATIONS:  -f/u inflammatory markers ( along with trops )  -HIV test  -HCQ for 5 days. monitor QTc  -prognosis is extremely poor given the imminent possibility of CRS

## 2020-03-26 NOTE — DIETITIAN INITIAL EVALUATION ADULT. - ENERGY INTAKE
currently patient is exceeding estimated kcal but meeting estimated protein at this time Fair (>50%)

## 2020-03-26 NOTE — PROGRESS NOTE ADULT - ASSESSMENT
IMPRESSION:    Acute Hypoxemic Respiratory Failure 2/2 COVID 19  ARDS  H/O Afib on Eliquis    PLAN:    CNS: SAT.      HEENT: ET care ,oral care    PULMONARY:  HOB @ 45 degrees. Aspiration precautions. Vent changes as follows: ARDS network protocol. Dec fio2 50%.  RR 24.  PEEP 15     CARDIOVASCULAR: Avoid volume overload , ECHO. DC Wean Levophed    GI: GI prophylaxis.  OG feeding,  Free H2O    RENAL:  Follow up lytes.  Correct as needed    INFECTIOUS DISEASE: Follow up cultures; Finish Hydroxychloroquine.  Finish Unasyn.  Nasal MRSA,     HEMATOLOGICAL:  DVT prophylaxis. Can start LMWH     ENDOCRINE:  Follow up FS.  Insulin protocol if needed.    MUSCULOSKELETAL: Bedrest    Lines: TLC     Disposition: MICU monitoring

## 2020-03-26 NOTE — DIETITIAN INITIAL EVALUATION ADULT. - OTHER INFO
p/w COVID r/o given fever, non productive cough, body ache, and decreased po for 7 days. Found with COVID+. Hypoxia resp failure requiring intubation. CXR b/l reticular infiltration. on abx. chronic a fib. TF.

## 2020-03-26 NOTE — PROGRESS NOTE ADULT - SUBJECTIVE AND OBJECTIVE BOX
Patient is a 70y old  Female who presents with a chief complaint of covid r/o given fevers, non productive cough (25 Mar 2020 11:46)        Over Night Events:  Remains critically ill on MV.  Sedated.  Remains on Levophed.          ROS:     CONSTITUTIONAL:   fever   no chills.  no weight gain   no weight loss    EYES:   no discharge,   no pain  no redness,   no visual changes.    ENT:   Ears: no ear pain and no hearing problems.  Nose: no nasal congestion and no nasal drainage.  Mouth/Throat: no dysphagia,  no hoarseness and no throat pain.  Neck: no lumps, no pain, no stiffness and no swollen glands.     CARDIOVASCULAR:   no chest pain,   no swelling  no palpitaions  no syncope    RESPIRATORY:  Per HPI     GASTROINTESTINAL:   no abdominal pain,   no constipation,   no diarrhea,   no vomiting.    GENITOURINARY:  no dysuria,   no frequency,   no urgency  no hematuria.    MUSCULOSKELETAL:   no back pain,   no musculoskeletal pain,  no weakness.    SKIN:   no jaundice,   no lesions,   no pruritis,   no rashes.    NEURO:   Sedated    PSYCHIATRIC:   Sedated     ALLERGIC/IMMUNOLOGIC:   No active allergic or immunologic issues        PHYSICAL EXAM    ICU Vital Signs Last 24 Hrs  T(C): 38.3 (26 Mar 2020 08:00), Max: 38.6 (26 Mar 2020 04:00)  T(F): 101 (26 Mar 2020 08:00), Max: 101.4 (26 Mar 2020 04:00)  HR: 102 (26 Mar 2020 08:00) (72 - 116)  BP: 102/53 (26 Mar 2020 08:00) (72/49 - 166/75)  BP(mean): 73 (26 Mar 2020 08:00) (47 - 107)  ABP: --  ABP(mean): --  RR: 23 (26 Mar 2020 08:00) (19 - 65)  SpO2: 96% (26 Mar 2020 08:00) (82% - 100%)      CONSTITUTIONAL:   Ill appearing.  Well nourished.  NAD    ENT:   Airway patent,   Mouth with normal mucosa.   No thrush    EYES:   Pupils equal,   Round and reactive to light.    CARDIAC:   Tachycardic  Regular rhythm.    No edema      Vascular:  Normal systolic impulse  No Carotid bruits    RESPIRATORY:   No wheezing  Bilateral BS  Normal chest expansion  Not tachypneic,  No use of accessory muscles    GASTROINTESTINAL:  Abdomen soft,   Non-tender,   No guarding,   + BS    GENITOURINARY  normal genitalia for sex  no edema    MUSCULOSKELETAL:   Range of motion is not limited,  No muscle or joint tenderness  No clubbing, cyanosis    NEUROLOGICAL:   Sedated    SKIN:   Skin normal color for race,   Warm and dry and intact.   No evidence of rash.    PSYCHIATRIC:   Sedated     HEME LYMPH:   No cervical  lymphadenopathy.  no inguinal lymphadenopathy      03-25-20 @ 07:01  -  03-26-20 @ 07:00  --------------------------------------------------------  IN:    fentaNYL Infusion.: 65.5 mL    fentaNYL Infusion.: 117.5 mL    IV PiggyBack: 203 mL    midazolam Infusion: 69.4 mL    norepinephrine Infusion: 294.3 mL    Osmolite: 450 mL  Total IN: 1199.7 mL    OUT:    Indwelling Catheter - Urethral: 1320 mL  Total OUT: 1320 mL    Total NET: -120.3 mL      03-26-20 @ 07:01  -  03-26-20 @ 08:13  --------------------------------------------------------  IN:    fentaNYL Infusion.: 6.6 mL    midazolam Infusion: 6 mL    norepinephrine Infusion: 36 mL  Total IN: 48.6 mL    OUT:    Indwelling Catheter - Urethral: 50 mL  Total OUT: 50 mL    Total NET: -1.4 mL          LABS:                            12.8   5.92  )-----------( 246      ( 26 Mar 2020 05:00 )             39.8                                               03-26    145  |  111<H>  |  10  ----------------------------<  192<H>  4.3   |  19  |  0.8    Ca    8.0<L>      26 Mar 2020 05:00  Mg     2.1     03-26    TPro  4.6<L>  /  Alb  2.5<L>  /  TBili  0.3  /  DBili  x   /  AST  61<H>  /  ALT  36  /  AlkPhos  82  03-25      PT/INR - ( 25 Mar 2020 04:45 )   PT: 14.20 sec;   INR: 1.23 ratio         PTT - ( 25 Mar 2020 04:45 )  PTT:38.2 sec                                                                                     LIVER FUNCTIONS - ( 25 Mar 2020 04:45 )  Alb: 2.5 g/dL / Pro: 4.6 g/dL / ALK PHOS: 82 U/L / ALT: 36 U/L / AST: 61 U/L / GGT: x                                                                                               Mode: AC/ CMV (Assist Control/ Continuous Mandatory Ventilation)  RR (machine): 24  TV (machine): 400  FiO2: 60  PEEP: 12.5  ITime: 1  MAP: 19  PIP: 30                                      ABG - ( 26 Mar 2020 03:43 )  pH, Arterial: 7.28  pH, Blood: x     /  pCO2: 43    /  pO2: 68    / HCO3: 20    / Base Excess: -6.1  /  SaO2: 93    PPL 25              MEDICATIONS  (STANDING):  ampicillin/sulbactam  IVPB 3 Gram(s) IV Intermittent every 6 hours  chlorhexidine 0.12% Liquid 15 milliLiter(s) Oral Mucosa two times a day  chlorhexidine 4% Liquid 1 Application(s) Topical <User Schedule>  enoxaparin Injectable 65 milliGRAM(s) SubCutaneous every 12 hours  fentaNYL   Infusion. 1 MICROgram(s)/kG/Hr (6.58 mL/Hr) IV Continuous <Continuous>  hydroxychloroquine 200 milliGRAM(s) Oral every 12 hours  hydroxychloroquine   Oral   midazolam Infusion 0.08 mG/kG/Hr (5.26 mL/Hr) IV Continuous <Continuous>  norepinephrine Infusion 0.05 MICROgram(s)/kG/Min (6.14 mL/Hr) IV Continuous <Continuous>  pantoprazole   Suspension 40 milliGRAM(s) Oral daily    MEDICATIONS  (PRN):  acetaminophen   Tablet .. 650 milliGRAM(s) Oral every 6 hours PRN Temp greater or equal to 38C (100.4F)      New X-rays reviewed:                                                                                  ECHO    CXR interpreted by me:  ET OG OK.  Bibasilar infiltrates

## 2020-03-26 NOTE — DIETITIAN INITIAL EVALUATION ADULT. - CONTINUE CURRENT NUTRITION CARE PLAN
pt to meet and tolerate % of estimated kcal/protein via TF upon f/u in 4 days. Enteral nutrition. RD to monitor diet order, energy intake, Body composition, NFPF (EN tolerance, glucose profile)

## 2020-03-26 NOTE — DIETITIAN INITIAL EVALUATION ADULT. - REASON INDICATOR FOR ASSESSMENT
OGT initiated, intubated to ventilator,  Tried contacting MARLEE at [phone] at 10:15am but no one picked up. Pt is currently with Ve 9.63, /58, MAP 81, on IV fentanyl, versed, pressor. Temp 37.1c. Per RN, pt received Osmolite 1.5 at 300cc q6hr (OGT) and tolerated it. OGT initiated, intubated to ventilator, COVID19+, Tried contacting MARLEE at [phone] at 10:15am but no one picked up. Pt is currently with Ve 9.63, /58, MAP 81, on IV fentanyl, versed, pressor. Temp 37.1c. Per RN, pt received Osmolite 1.5 at 300cc q6hr (OGT) and tolerated it.

## 2020-03-27 LAB
ALBUMIN SERPL ELPH-MCNC: 2.5 G/DL — LOW (ref 3.5–5.2)
ALBUMIN SERPL ELPH-MCNC: 2.5 G/DL — LOW (ref 3.5–5.2)
ALP SERPL-CCNC: 124 U/L — HIGH (ref 30–115)
ALP SERPL-CCNC: 142 U/L — HIGH (ref 30–115)
ALT FLD-CCNC: 115 U/L — HIGH (ref 0–41)
ALT FLD-CCNC: 63 U/L — HIGH (ref 0–41)
ANION GAP SERPL CALC-SCNC: 8 MMOL/L — SIGNIFICANT CHANGE UP (ref 7–14)
ANION GAP SERPL CALC-SCNC: 9 MMOL/L — SIGNIFICANT CHANGE UP (ref 7–14)
AST SERPL-CCNC: 146 U/L — HIGH (ref 0–41)
AST SERPL-CCNC: 64 U/L — HIGH (ref 0–41)
BASE EXCESS BLDA CALC-SCNC: 1.5 MMOL/L — SIGNIFICANT CHANGE UP (ref -2–2)
BILIRUB SERPL-MCNC: 0.2 MG/DL — SIGNIFICANT CHANGE UP (ref 0.2–1.2)
BILIRUB SERPL-MCNC: <0.2 MG/DL — SIGNIFICANT CHANGE UP (ref 0.2–1.2)
BUN SERPL-MCNC: 10 MG/DL — SIGNIFICANT CHANGE UP (ref 10–20)
BUN SERPL-MCNC: 11 MG/DL — SIGNIFICANT CHANGE UP (ref 10–20)
CALCIUM SERPL-MCNC: 7.4 MG/DL — LOW (ref 8.5–10.1)
CALCIUM SERPL-MCNC: 7.6 MG/DL — LOW (ref 8.5–10.1)
CHLORIDE SERPL-SCNC: 105 MMOL/L — SIGNIFICANT CHANGE UP (ref 98–110)
CHLORIDE SERPL-SCNC: 110 MMOL/L — SIGNIFICANT CHANGE UP (ref 98–110)
CO2 SERPL-SCNC: 28 MMOL/L — SIGNIFICANT CHANGE UP (ref 17–32)
CO2 SERPL-SCNC: 31 MMOL/L — SIGNIFICANT CHANGE UP (ref 17–32)
CREAT SERPL-MCNC: 0.6 MG/DL — LOW (ref 0.7–1.5)
CREAT SERPL-MCNC: 0.7 MG/DL — SIGNIFICANT CHANGE UP (ref 0.7–1.5)
CULTURE RESULTS: SIGNIFICANT CHANGE UP
CULTURE RESULTS: SIGNIFICANT CHANGE UP
GLUCOSE SERPL-MCNC: 183 MG/DL — HIGH (ref 70–99)
GLUCOSE SERPL-MCNC: 193 MG/DL — HIGH (ref 70–99)
HCO3 BLDA-SCNC: 29 MMOL/L — SIGNIFICANT CHANGE UP (ref 21–29)
HCT VFR BLD CALC: 39.7 % — SIGNIFICANT CHANGE UP (ref 37–47)
HGB BLD-MCNC: 12.8 G/DL — SIGNIFICANT CHANGE UP (ref 12–16)
MAGNESIUM SERPL-MCNC: 2.1 MG/DL — SIGNIFICANT CHANGE UP (ref 1.8–2.4)
MAGNESIUM SERPL-MCNC: 2.4 MG/DL — SIGNIFICANT CHANGE UP (ref 1.8–2.4)
MCHC RBC-ENTMCNC: 28.5 PG — SIGNIFICANT CHANGE UP (ref 27–31)
MCHC RBC-ENTMCNC: 32.2 G/DL — SIGNIFICANT CHANGE UP (ref 32–37)
MCV RBC AUTO: 88.4 FL — SIGNIFICANT CHANGE UP (ref 81–99)
NRBC # BLD: 0 /100 WBCS — SIGNIFICANT CHANGE UP (ref 0–0)
NT-PROBNP SERPL-SCNC: 3267 PG/ML — HIGH (ref 0–300)
PCO2 BLDA: 57 MMHG — HIGH (ref 38–42)
PH BLDA: 7.31 — LOW (ref 7.38–7.42)
PLATELET # BLD AUTO: 309 K/UL — SIGNIFICANT CHANGE UP (ref 130–400)
PO2 BLDA: 97 MMHG — HIGH (ref 78–95)
POTASSIUM SERPL-MCNC: 3.9 MMOL/L — SIGNIFICANT CHANGE UP (ref 3.5–5)
POTASSIUM SERPL-MCNC: 4.1 MMOL/L — SIGNIFICANT CHANGE UP (ref 3.5–5)
POTASSIUM SERPL-SCNC: 3.9 MMOL/L — SIGNIFICANT CHANGE UP (ref 3.5–5)
POTASSIUM SERPL-SCNC: 4.1 MMOL/L — SIGNIFICANT CHANGE UP (ref 3.5–5)
PROT SERPL-MCNC: 4.7 G/DL — LOW (ref 6–8)
PROT SERPL-MCNC: 4.7 G/DL — LOW (ref 6–8)
RBC # BLD: 4.49 M/UL — SIGNIFICANT CHANGE UP (ref 4.2–5.4)
RBC # FLD: 14.6 % — HIGH (ref 11.5–14.5)
SAO2 % BLDA: 98 % — HIGH (ref 92–96)
SODIUM SERPL-SCNC: 145 MMOL/L — SIGNIFICANT CHANGE UP (ref 135–146)
SODIUM SERPL-SCNC: 146 MMOL/L — SIGNIFICANT CHANGE UP (ref 135–146)
SPECIMEN SOURCE: SIGNIFICANT CHANGE UP
SPECIMEN SOURCE: SIGNIFICANT CHANGE UP
WBC # BLD: 6.02 K/UL — SIGNIFICANT CHANGE UP (ref 4.8–10.8)
WBC # FLD AUTO: 6.02 K/UL — SIGNIFICANT CHANGE UP (ref 4.8–10.8)

## 2020-03-27 PROCEDURE — 93010 ELECTROCARDIOGRAM REPORT: CPT

## 2020-03-27 PROCEDURE — 71045 X-RAY EXAM CHEST 1 VIEW: CPT | Mod: 26

## 2020-03-27 RX ORDER — INSULIN HUMAN 100 [IU]/ML
8 INJECTION, SOLUTION SUBCUTANEOUS ONCE
Refills: 0 | Status: DISCONTINUED | OUTPATIENT
Start: 2020-03-27 | End: 2020-03-27

## 2020-03-27 RX ORDER — VASOPRESSIN 20 [USP'U]/ML
0.04 INJECTION INTRAVENOUS
Qty: 50 | Refills: 0 | Status: DISCONTINUED | OUTPATIENT
Start: 2020-03-27 | End: 2020-03-31

## 2020-03-27 RX ORDER — SODIUM CHLORIDE 9 MG/ML
1000 INJECTION, SOLUTION INTRAVENOUS
Refills: 0 | Status: DISCONTINUED | OUTPATIENT
Start: 2020-03-27 | End: 2020-03-29

## 2020-03-27 RX ORDER — POTASSIUM CHLORIDE 20 MEQ
40 PACKET (EA) ORAL ONCE
Refills: 0 | Status: COMPLETED | OUTPATIENT
Start: 2020-03-27 | End: 2020-03-27

## 2020-03-27 RX ORDER — FUROSEMIDE 40 MG
60 TABLET ORAL ONCE
Refills: 0 | Status: COMPLETED | OUTPATIENT
Start: 2020-03-27 | End: 2020-03-27

## 2020-03-27 RX ORDER — METOPROLOL TARTRATE 50 MG
5 TABLET ORAL ONCE
Refills: 0 | Status: COMPLETED | OUTPATIENT
Start: 2020-03-27 | End: 2020-03-27

## 2020-03-27 RX ADMIN — Medication 650 MILLIGRAM(S): at 21:34

## 2020-03-27 RX ADMIN — Medication 5 MILLIGRAM(S): at 22:15

## 2020-03-27 RX ADMIN — Medication 650 MILLIGRAM(S): at 10:00

## 2020-03-27 RX ADMIN — AMPICILLIN SODIUM AND SULBACTAM SODIUM 200 GRAM(S): 250; 125 INJECTION, POWDER, FOR SUSPENSION INTRAMUSCULAR; INTRAVENOUS at 18:12

## 2020-03-27 RX ADMIN — SODIUM CHLORIDE 30 MILLILITER(S): 9 INJECTION, SOLUTION INTRAVENOUS at 11:01

## 2020-03-27 RX ADMIN — PANTOPRAZOLE SODIUM 40 MILLIGRAM(S): 20 TABLET, DELAYED RELEASE ORAL at 11:54

## 2020-03-27 RX ADMIN — Medication 200 MILLIGRAM(S): at 05:08

## 2020-03-27 RX ADMIN — MORPHINE SULFATE 2 MG/HR: 50 CAPSULE, EXTENDED RELEASE ORAL at 06:04

## 2020-03-27 RX ADMIN — CHLORHEXIDINE GLUCONATE 15 MILLILITER(S): 213 SOLUTION TOPICAL at 18:12

## 2020-03-27 RX ADMIN — ENOXAPARIN SODIUM 65 MILLIGRAM(S): 100 INJECTION SUBCUTANEOUS at 18:13

## 2020-03-27 RX ADMIN — AMPICILLIN SODIUM AND SULBACTAM SODIUM 200 GRAM(S): 250; 125 INJECTION, POWDER, FOR SUSPENSION INTRAMUSCULAR; INTRAVENOUS at 05:10

## 2020-03-27 RX ADMIN — Medication 60 MILLIGRAM(S): at 10:37

## 2020-03-27 RX ADMIN — ENOXAPARIN SODIUM 65 MILLIGRAM(S): 100 INJECTION SUBCUTANEOUS at 05:09

## 2020-03-27 RX ADMIN — CHLORHEXIDINE GLUCONATE 15 MILLILITER(S): 213 SOLUTION TOPICAL at 05:10

## 2020-03-27 RX ADMIN — AMPICILLIN SODIUM AND SULBACTAM SODIUM 200 GRAM(S): 250; 125 INJECTION, POWDER, FOR SUSPENSION INTRAMUSCULAR; INTRAVENOUS at 01:01

## 2020-03-27 RX ADMIN — AMPICILLIN SODIUM AND SULBACTAM SODIUM 200 GRAM(S): 250; 125 INJECTION, POWDER, FOR SUSPENSION INTRAMUSCULAR; INTRAVENOUS at 11:01

## 2020-03-27 RX ADMIN — Medication 200 MILLIGRAM(S): at 18:12

## 2020-03-27 RX ADMIN — Medication 650 MILLIGRAM(S): at 09:07

## 2020-03-27 RX ADMIN — CHLORHEXIDINE GLUCONATE 1 APPLICATION(S): 213 SOLUTION TOPICAL at 05:11

## 2020-03-27 NOTE — PROGRESS NOTE ADULT - SUBJECTIVE AND OBJECTIVE BOX
Patient is a 70y old  Female who presents with a chief complaint of covid r/o given fevers, non productive cough (26 Mar 2020 12:30)        Over Night Events: Remains critically ill and on MV, On levophed 0.07, sedated        ROS:     CONSTITUTIONAL:   fever   no chills.  no weight gain   no weight loss    EYES:   no discharge,   no pain  no redness,   no visual changes.    ENT:   Ears: no ear pain and no hearing problems.  Nose: no nasal congestion and no nasal drainage.  Mouth/Throat: no dysphagia,  no hoarseness and no throat pain.  Neck: no lumps, no pain, no stiffness and no swollen glands.     CARDIOVASCULAR:   as per HPI    RESPIRATORY:  as per HPI    GASTROINTESTINAL:   no abdominal pain,   no constipation,   no diarrhea,   no vomiting.    GENITOURINARY:  no dysuria,   no frequency,   no urgency  no hematuria.    MUSCULOSKELETAL:   no back pain,   no musculoskeletal pain,  no weakness.    SKIN:   no jaundice,   no lesions,   no pruritis,   no rashes.    NEURO:   sedated    PSYCHIATRIC:   no known mental health issues  no anxiety  no depression    ALLERGIC/IMMUNOLOGIC:   No active allergic or immunologic issues        PHYSICAL EXAM    ICU Vital Signs Last 24 Hrs  T(C): 38.5 (27 Mar 2020 08:00), Max: 38.5 (27 Mar 2020 08:00)  T(F): 101.3 (27 Mar 2020 08:00), Max: 101.3 (27 Mar 2020 08:00)  HR: 106 (27 Mar 2020 09:45) (86 - 118)  BP: 103/54 (27 Mar 2020 09:45) (73/42 - 138/67)  BP(mean): 67 (27 Mar 2020 09:45) (52 - 107)  ABP: --  ABP(mean): --  RR: 24 (27 Mar 2020 09:45) (23 - 54)  SpO2: 97% (27 Mar 2020 09:45) (92% - 100%)      CONSTITUTIONAL:   Ill appearing.  Well nourished.  NAD    ENT:   ET+  Airway patent,   Mouth with normal mucosa.   No thrush    EYES:   Pupils equal,   Round and reactive to light.    CARDIAC:   tahcycardic    Regular rhythm.    No edema      Vascular:  Normal systolic impulse  No Carotid bruits    RESPIRATORY:   No wheezing  Bilateral BS  Normal chest expansion  Not tachypneic,  No use of accessory muscles    GASTROINTESTINAL:  Abdomen soft,   Non-tender,   No guarding,   + BS    GENITOURINARY  normal genitalia for sex  no edema    MUSCULOSKELETAL:   Range of motion is not limited,  No muscle or joint tenderness  No clubbing, cyanosis    NEUROLOGICAL:   Sedated     SKIN:   Skin normal color for race,   Warm and dry and intact.   No evidence of rash.    PSYCHIATRIC:   Sedated     HEME LYMPH:   No cervical  lymphadenopathy.  no inguinal lymphadenopathy      03-26-20 @ 07:01  -  03-27-20 @ 07:00  --------------------------------------------------------  IN:    fentaNYL Infusion.: 19.8 mL    Free Water: 900 mL    IV PiggyBack: 300 mL    midazolam Infusion: 89.6 mL    morphine  Infusion: 93 mL    norepinephrine Infusion: 321.2 mL    Osmolite: 1200 mL  Total IN: 2923.6 mL    OUT:    Indwelling Catheter - Urethral: 830 mL  Total OUT: 830 mL    Total NET: 2093.6 mL      03-27-20 @ 07:01  -  03-27-20 @ 10:14  --------------------------------------------------------  IN:    midazolam Infusion: 9.9 mL    morphine  Infusion: 15 mL    norepinephrine Infusion: 26.8 mL  Total IN: 51.7 mL    OUT:    Indwelling Catheter - Urethral: 60 mL  Total OUT: 60 mL    Total NET: -8.3 mL          LABS:                            12.8   6.02  )-----------( 309      ( 27 Mar 2020 05:25 )             39.7                                               03-27    146  |  110  |  10  ----------------------------<  193<H>  4.1   |  28  |  0.7    Ca    7.6<L>      27 Mar 2020 05:25  Mg     2.4     03-27    TPro  4.7<L>  /  Alb  2.5<L>  /  TBili  <0.2  /  DBili  x   /  AST  64<H>  /  ALT  63<H>  /  AlkPhos  124<H>  03-27                                                                                           LIVER FUNCTIONS - ( 27 Mar 2020 05:25 )  Alb: 2.5 g/dL / Pro: 4.7 g/dL / ALK PHOS: 124 U/L / ALT: 63 U/L / AST: 64 U/L / GGT: x                                                                                               Mode: AC/ CMV (Assist Control/ Continuous Mandatory Ventilation)  RR (machine): 24  TV (machine): 400  FiO2: 60  PEEP: 15  ITime: 1  MAP: 21  PIP: 30                                      ABG - ( 27 Mar 2020 03:26 )  pH, Arterial: 7.31  pH, Blood: x     /  pCO2: 57    /  pO2: 97    / HCO3: 29    / Base Excess: 1.5   /  SaO2: 98    PPL 29              MEDICATIONS  (STANDING):  ampicillin/sulbactam  IVPB 3 Gram(s) IV Intermittent every 6 hours  chlorhexidine 0.12% Liquid 15 milliLiter(s) Oral Mucosa two times a day  chlorhexidine 4% Liquid 1 Application(s) Topical <User Schedule>  enoxaparin Injectable 65 milliGRAM(s) SubCutaneous every 12 hours  hydroxychloroquine 200 milliGRAM(s) Oral every 12 hours  hydroxychloroquine   Oral   midazolam Infusion 0.08 mG/kG/Hr (5.26 mL/Hr) IV Continuous <Continuous>  morphine  Infusion 2 mG/Hr (2 mL/Hr) IV Continuous <Continuous>  norepinephrine Infusion 0.05 MICROgram(s)/kG/Min (6.14 mL/Hr) IV Continuous <Continuous>  pantoprazole   Suspension 40 milliGRAM(s) Oral daily    MEDICATIONS  (PRN):  acetaminophen   Tablet .. 650 milliGRAM(s) Oral every 6 hours PRN Temp greater or equal to 38C (100.4F)      New X-rays reviewed:                                                                                  ECHO    CXR interpreted by me:  ET OG OK.  Improving right sided infiltrate.  Left side unchanged

## 2020-03-27 NOTE — PROGRESS NOTE ADULT - SUBJECTIVE AND OBJECTIVE BOX
CLIFTON LYNN 70y Female  MRN#: [id number]     SUBJECTIVE  Patient is a 70y old Female who presents with a chief complaint of covid r/o given fevers, non productive cough (27 Mar 2020 10:13)  Currently admitted to medicine with the primary diagnosis of Sepsis  Today is hospital day 5d, and this morning she is intubated and sedated.  NO events overnight     OBJECTIVE  PAST MEDICAL & SURGICAL HISTORY  Afib    ALLERGIES:  Originally Entered as [Unknown] reaction to [green pepper] (Unknown)  Originally Entered as [Unknown] reaction to [pepper] (Unknown)  sulfa drugs (Unknown)    MEDICATIONS:  STANDING MEDICATIONS  ampicillin/sulbactam  IVPB 3 Gram(s) IV Intermittent every 6 hours  chlorhexidine 0.12% Liquid 15 milliLiter(s) Oral Mucosa two times a day  chlorhexidine 4% Liquid 1 Application(s) Topical <User Schedule>  dextrose 5%. 1000 milliLiter(s) IV Continuous <Continuous>  enoxaparin Injectable 65 milliGRAM(s) SubCutaneous every 12 hours  furosemide   Injectable 60 milliGRAM(s) IV Push once  hydroxychloroquine 200 milliGRAM(s) Oral every 12 hours  hydroxychloroquine   Oral   midazolam Infusion 0.08 mG/kG/Hr IV Continuous <Continuous>  morphine  Infusion 2 mG/Hr IV Continuous <Continuous>  norepinephrine Infusion 0.05 MICROgram(s)/kG/Min IV Continuous <Continuous>  pantoprazole   Suspension 40 milliGRAM(s) Oral daily    PRN MEDICATIONS  acetaminophen   Tablet .. 650 milliGRAM(s) Oral every 6 hours PRN      VITAL SIGNS: Last 24 Hours  T(C): 38.5 (27 Mar 2020 08:00), Max: 38.5 (27 Mar 2020 08:00)  T(F): 101.3 (27 Mar 2020 08:00), Max: 101.3 (27 Mar 2020 08:00)  HR: 106 (27 Mar 2020 09:45) (86 - 118)  BP: 103/54 (27 Mar 2020 09:45) (73/42 - 138/67)  BP(mean): 67 (27 Mar 2020 09:45) (52 - 107)  RR: 24 (27 Mar 2020 09:45) (23 - 54)  SpO2: 97% (27 Mar 2020 09:45) (92% - 100%)    LABS:                        12.8   6.02  )-----------( 309      ( 27 Mar 2020 05:25 )             39.7     03-27    146  |  110  |  10  ----------------------------<  193<H>  4.1   |  28  |  0.7    Ca    7.6<L>      27 Mar 2020 05:25  Mg     2.4     03-27    TPro  4.7<L>  /  Alb  2.5<L>  /  TBili  <0.2  /  DBili  x   /  AST  64<H>  /  ALT  63<H>  /  AlkPhos  124<H>  03-27        ABG - ( 27 Mar 2020 03:26 )  pH, Arterial: 7.31  pH, Blood: x     /  pCO2: 57    /  pO2: 97    / HCO3: 29    / Base Excess: 1.5   /  SaO2: 98          PHYSICAL EXAM:  GENERAL: intubated/sedated, does not withdraw from pain  HEENT:  Atraumatic, Normocephalic. EOMI, PERRLA, conjunctiva and sclera clear, No JVD  PULMONARY: Clear to auscultation bilaterally; No wheeze  CARDIOVASCULAR: Regular rate and rhythm; No murmurs, rubs, or gallops  GASTROINTESTINAL: Soft, Nontender, Nondistended; Bowel sounds present  MUSCULOSKELETAL:  2+ Peripheral Pulses, No clubbing, cyanosis, or edema  NEUROLOGY: non-focal  SKIN: No rash      ASSESSMENT & PLAN    69 y/o female with pmh of a-fib, c/o fever, non-productive cough, body aches and decreased appetite x 7 days. No known sick contacts. No diarrhea. No ABD pain.   Patient was found to be covid +  She required intubated on 3/24/2020 for hypoxic respiratory failure.     # hypoxic respiratory failure due to COVID -19 +, requiring intubation  CXR: bilateral reticular infiltrates - improving on the right side  Still requiring same o2 on ventilator  On levophed  - on Unasyn for possible aspiration PNA (start on 3/24) for 5 days  - s/p 1 dose of toculizumab  - on plaqunil (start 3/23), for 1 more dose then stop  - monitor QTs. Qt ktdop=804. Keep Mg>2 and k>4  - on low dose Levophed, tapering down     # volume overload +2L  - will give 1 dose of lasix and determine if o2 requirements decrease    # Chronic Afib  - now in NSR  - BB stopped as patient is requiring pressors   - rate is still well controlled  - on Lovenox therapeutic dose    # mild hypernatremia  - free water 300mg q 6hrs   - will start d5w and montior     Diet: tube feeds  Lines: left IJ  Garcia present  DVT prophylaxis: Lovenox theraputic  Ambulation: bedrest

## 2020-03-27 NOTE — PROGRESS NOTE ADULT - ASSESSMENT
IMPRESSION:    Acute Hypoxemic Respiratory Failure 2/2 COVID 19  ARDS  H/O Afib on Eliquis    PLAN:    CNS: SAT.  ANd assure adequate sedation     HEENT: ET care ,oral care    PULMONARY:  HOB @ 45 degrees. Aspiration precautions. Vent changes as follows: ARDS network protocol.     CARDIOVASCULAR: Avoid volume overload.  Lasix.  D5W 30 cc per hour.  FU QTC     GI: GI prophylaxis.  OG feeding,  Free H2O    RENAL:  Follow up lytes.  Correct as needed    INFECTIOUS DISEASE: Follow up cultures; Finish Hydroxychloroquine.  Finish Unasyn.     HEMATOLOGICAL:  DVT prophylaxis. Can start LMWH     ENDOCRINE:  Follow up FS.  Insulin protocol if needed.    MUSCULOSKELETAL: Bedrest    Lines: TLC     Disposition: MICU monitoring

## 2020-03-27 NOTE — PROGRESS NOTE ADULT - SUBJECTIVE AND OBJECTIVE BOX
LEAHCLIFTON  70y, Female    All available historical data reviewed    OVERNIGHT EVENTS:  none    ROS:  Fio2 60%    VITALS:  T(F): 101.3, Max: 101.3 (03-27-20 @ 08:00)  HR: 106  BP: 103/54  RR: 24Vital Signs Last 24 Hrs  T(C): 38.5 (27 Mar 2020 08:00), Max: 38.5 (27 Mar 2020 08:00)  T(F): 101.3 (27 Mar 2020 08:00), Max: 101.3 (27 Mar 2020 08:00)  HR: 106 (27 Mar 2020 09:45) (86 - 118)  BP: 103/54 (27 Mar 2020 09:45) (73/42 - 138/67)  BP(mean): 67 (27 Mar 2020 09:45) (52 - 107)  RR: 24 (27 Mar 2020 09:45) (23 - 54)  SpO2: 97% (27 Mar 2020 09:45) (92% - 100%)    TESTS & MEASUREMENTS:                        12.8   6.02  )-----------( 309      ( 27 Mar 2020 05:25 )             39.7     03-27    146  |  110  |  10  ----------------------------<  193<H>  4.1   |  28  |  0.7    Ca    7.6<L>      27 Mar 2020 05:25  Mg     2.4     03-27    TPro  4.7<L>  /  Alb  2.5<L>  /  TBili  <0.2  /  DBili  x   /  AST  64<H>  /  ALT  63<H>  /  AlkPhos  124<H>  03-27    LIVER FUNCTIONS - ( 27 Mar 2020 05:25 )  Alb: 2.5 g/dL / Pro: 4.7 g/dL / ALK PHOS: 124 U/L / ALT: 63 U/L / AST: 64 U/L / GGT: x             Culture - Blood (collected 03-22-20 @ 13:15)  Source: .Blood Blood-Peripheral  Preliminary Report (03-23-20 @ 20:01):    No growth to date.    Culture - Blood (collected 03-22-20 @ 13:15)  Source: .Blood Blood-Peripheral  Preliminary Report (03-23-20 @ 20:01):    No growth to date.            RADIOLOGY & ADDITIONAL TESTS:  Personal review of radiological diagnostics performed  Echo and EKG results noted when applicable.     MEDICATIONS:  acetaminophen   Tablet .. 650 milliGRAM(s) Oral every 6 hours PRN  ampicillin/sulbactam  IVPB 3 Gram(s) IV Intermittent every 6 hours  chlorhexidine 0.12% Liquid 15 milliLiter(s) Oral Mucosa two times a day  chlorhexidine 4% Liquid 1 Application(s) Topical <User Schedule>  dextrose 5%. 1000 milliLiter(s) IV Continuous <Continuous>  enoxaparin Injectable 65 milliGRAM(s) SubCutaneous every 12 hours  hydroxychloroquine 200 milliGRAM(s) Oral every 12 hours  hydroxychloroquine   Oral   midazolam Infusion 0.08 mG/kG/Hr IV Continuous <Continuous>  morphine  Infusion 2 mG/Hr IV Continuous <Continuous>  norepinephrine Infusion 0.05 MICROgram(s)/kG/Min IV Continuous <Continuous>  pantoprazole   Suspension 40 milliGRAM(s) Oral daily      ANTIBIOTICS:  ampicillin/sulbactam  IVPB 3 Gram(s) IV Intermittent every 6 hours  hydroxychloroquine 200 milliGRAM(s) Oral every 12 hours  hydroxychloroquine   Oral

## 2020-03-27 NOTE — PROGRESS NOTE ADULT - ASSESSMENT
69 y/o female with pmh of a-fib on Eliquis , c/o fever, non-productive cough, body aches and decreased appetite x 7 days. No known sick contacts. No diarrhea. No ABD pain. No H/A, no neck pain. No dysuria/frequency/urgency. Presented with sob  No hemoptysis.  Patient admitted to internal medicine service for acute hypoxemic respiratory failure 2/2 covid19 .Patient intubated in CEU     IMPRESSION:  #Severe septic shock ( ( T 101.3 , /m  on levo with lactate acidemia 1.8  ) secondary to COVID19 with no end organ damage  -s/p Toci 3/24  -CXR progressively worse since admission with bibasilar opacities  -procal 0.06 > 0.11which goes against a bacterial process  -no ARF  -ALT mild elevation  -BC 3/22 NG  -inflammatory markers increasing. Ferritin 1047    RECOMMENDATIONS:  -track Ferritin  -HIV test  -HCQ for 5 days. monitor QTc  -prognosis is extremely poor given the imminent possibility of cytokine storm

## 2020-03-28 LAB
ALBUMIN SERPL ELPH-MCNC: 2.3 G/DL — LOW (ref 3.5–5.2)
ALP SERPL-CCNC: 148 U/L — HIGH (ref 30–115)
ALT FLD-CCNC: 150 U/L — HIGH (ref 0–41)
ANION GAP SERPL CALC-SCNC: 7 MMOL/L — SIGNIFICANT CHANGE UP (ref 7–14)
ANION GAP SERPL CALC-SCNC: 9 MMOL/L — SIGNIFICANT CHANGE UP (ref 7–14)
APPEARANCE UR: CLEAR — SIGNIFICANT CHANGE UP
AST SERPL-CCNC: 199 U/L — HIGH (ref 0–41)
BACTERIA # UR AUTO: NEGATIVE — SIGNIFICANT CHANGE UP
BASE EXCESS BLDA CALC-SCNC: 5.5 MMOL/L — HIGH (ref -2–2)
BILIRUB SERPL-MCNC: 0.2 MG/DL — SIGNIFICANT CHANGE UP (ref 0.2–1.2)
BILIRUB UR-MCNC: NEGATIVE — SIGNIFICANT CHANGE UP
BLD GP AB SCN SERPL QL: SIGNIFICANT CHANGE UP
BUN SERPL-MCNC: 13 MG/DL — SIGNIFICANT CHANGE UP (ref 10–20)
BUN SERPL-MCNC: 16 MG/DL — SIGNIFICANT CHANGE UP (ref 10–20)
CALCIUM SERPL-MCNC: 7.3 MG/DL — LOW (ref 8.5–10.1)
CALCIUM SERPL-MCNC: 7.4 MG/DL — LOW (ref 8.5–10.1)
CHLORIDE SERPL-SCNC: 102 MMOL/L — SIGNIFICANT CHANGE UP (ref 98–110)
CHLORIDE SERPL-SCNC: 105 MMOL/L — SIGNIFICANT CHANGE UP (ref 98–110)
CK SERPL-CCNC: 261 U/L — HIGH (ref 0–225)
CO2 SERPL-SCNC: 31 MMOL/L — SIGNIFICANT CHANGE UP (ref 17–32)
CO2 SERPL-SCNC: 32 MMOL/L — SIGNIFICANT CHANGE UP (ref 17–32)
COLOR SPEC: YELLOW — SIGNIFICANT CHANGE UP
CREAT SERPL-MCNC: 0.7 MG/DL — SIGNIFICANT CHANGE UP (ref 0.7–1.5)
CREAT SERPL-MCNC: 0.7 MG/DL — SIGNIFICANT CHANGE UP (ref 0.7–1.5)
CRP SERPL-MCNC: 1.09 MG/DL — HIGH (ref 0–0.4)
D DIMER BLD IA.RAPID-MCNC: 1563 NG/ML DDU — HIGH (ref 0–230)
DIFF PNL FLD: NEGATIVE — SIGNIFICANT CHANGE UP
EPI CELLS # UR: 6 /HPF — HIGH (ref 0–5)
ERYTHROCYTE [SEDIMENTATION RATE] IN BLOOD: 59 MM/HR — HIGH (ref 0–20)
FIBRINOGEN PPP-MCNC: >700 MG/DL — HIGH (ref 204.4–570.6)
GLUCOSE SERPL-MCNC: 172 MG/DL — HIGH (ref 70–99)
GLUCOSE SERPL-MCNC: 246 MG/DL — HIGH (ref 70–99)
GLUCOSE UR QL: ABNORMAL
HCO3 BLDA-SCNC: 32 MMOL/L — HIGH (ref 23–27)
HCT VFR BLD CALC: 37.1 % — SIGNIFICANT CHANGE UP (ref 37–47)
HGB BLD-MCNC: 12.1 G/DL — SIGNIFICANT CHANGE UP (ref 12–16)
HYALINE CASTS # UR AUTO: 15 /LPF — HIGH (ref 0–7)
KETONES UR-MCNC: NEGATIVE — SIGNIFICANT CHANGE UP
LEUKOCYTE ESTERASE UR-ACNC: NEGATIVE — SIGNIFICANT CHANGE UP
MAGNESIUM SERPL-MCNC: 2 MG/DL — SIGNIFICANT CHANGE UP (ref 1.8–2.4)
MAGNESIUM SERPL-MCNC: 2.2 MG/DL — SIGNIFICANT CHANGE UP (ref 1.8–2.4)
MCHC RBC-ENTMCNC: 29.7 PG — SIGNIFICANT CHANGE UP (ref 27–31)
MCHC RBC-ENTMCNC: 32.6 G/DL — SIGNIFICANT CHANGE UP (ref 32–37)
MCV RBC AUTO: 90.9 FL — SIGNIFICANT CHANGE UP (ref 81–99)
NITRITE UR-MCNC: NEGATIVE — SIGNIFICANT CHANGE UP
NRBC # BLD: 0 /100 WBCS — SIGNIFICANT CHANGE UP (ref 0–0)
PCO2 BLDA: 56 MMHG — HIGH (ref 38–42)
PH BLDA: 7.37 — LOW (ref 7.38–7.42)
PH UR: 6.5 — SIGNIFICANT CHANGE UP (ref 5–8)
PLATELET # BLD AUTO: 289 K/UL — SIGNIFICANT CHANGE UP (ref 130–400)
PO2 BLDA: 96 MMHG — HIGH (ref 78–95)
POTASSIUM SERPL-MCNC: 4.1 MMOL/L — SIGNIFICANT CHANGE UP (ref 3.5–5)
POTASSIUM SERPL-MCNC: 4.9 MMOL/L — SIGNIFICANT CHANGE UP (ref 3.5–5)
POTASSIUM SERPL-SCNC: 4.1 MMOL/L — SIGNIFICANT CHANGE UP (ref 3.5–5)
POTASSIUM SERPL-SCNC: 4.9 MMOL/L — SIGNIFICANT CHANGE UP (ref 3.5–5)
PROCALCITONIN SERPL-MCNC: 0.14 NG/ML — HIGH (ref 0.02–0.1)
PROT SERPL-MCNC: 4.5 G/DL — LOW (ref 6–8)
PROT UR-MCNC: ABNORMAL
RBC # BLD: 4.08 M/UL — LOW (ref 4.2–5.4)
RBC # FLD: 14.6 % — HIGH (ref 11.5–14.5)
RBC CASTS # UR COMP ASSIST: 2 /HPF — SIGNIFICANT CHANGE UP (ref 0–4)
SAO2 % BLDA: 98 % — HIGH (ref 94–98)
SODIUM SERPL-SCNC: 142 MMOL/L — SIGNIFICANT CHANGE UP (ref 135–146)
SODIUM SERPL-SCNC: 144 MMOL/L — SIGNIFICANT CHANGE UP (ref 135–146)
SP GR SPEC: 1.04 — HIGH (ref 1.01–1.02)
UROBILINOGEN FLD QL: ABNORMAL
WBC # BLD: 6.57 K/UL — SIGNIFICANT CHANGE UP (ref 4.8–10.8)
WBC # FLD AUTO: 6.57 K/UL — SIGNIFICANT CHANGE UP (ref 4.8–10.8)
WBC UR QL: 5 /HPF — SIGNIFICANT CHANGE UP (ref 0–5)

## 2020-03-28 PROCEDURE — 93010 ELECTROCARDIOGRAM REPORT: CPT

## 2020-03-28 PROCEDURE — 71045 X-RAY EXAM CHEST 1 VIEW: CPT | Mod: 26

## 2020-03-28 RX ORDER — ACETAMINOPHEN 500 MG
1000 TABLET ORAL ONCE
Refills: 0 | Status: COMPLETED | OUTPATIENT
Start: 2020-03-28 | End: 2020-03-28

## 2020-03-28 RX ORDER — AMIODARONE HYDROCHLORIDE 400 MG/1
1 TABLET ORAL
Qty: 900 | Refills: 0 | Status: DISCONTINUED | OUTPATIENT
Start: 2020-03-28 | End: 2020-03-30

## 2020-03-28 RX ORDER — AMIODARONE HYDROCHLORIDE 400 MG/1
TABLET ORAL
Refills: 0 | Status: DISCONTINUED | OUTPATIENT
Start: 2020-03-28 | End: 2020-03-28

## 2020-03-28 RX ORDER — CISATRACURIUM BESYLATE 2 MG/ML
6 INJECTION INTRAVENOUS ONCE
Refills: 0 | Status: DISCONTINUED | OUTPATIENT
Start: 2020-03-28 | End: 2020-03-28

## 2020-03-28 RX ORDER — FUROSEMIDE 40 MG
40 TABLET ORAL
Refills: 0 | Status: DISCONTINUED | OUTPATIENT
Start: 2020-03-28 | End: 2020-03-29

## 2020-03-28 RX ORDER — AMPICILLIN SODIUM AND SULBACTAM SODIUM 250; 125 MG/ML; MG/ML
3 INJECTION, POWDER, FOR SUSPENSION INTRAMUSCULAR; INTRAVENOUS EVERY 6 HOURS
Refills: 0 | Status: COMPLETED | OUTPATIENT
Start: 2020-03-28 | End: 2020-03-29

## 2020-03-28 RX ORDER — CISATRACURIUM BESYLATE 2 MG/ML
3 INJECTION INTRAVENOUS
Qty: 200 | Refills: 0 | Status: DISCONTINUED | OUTPATIENT
Start: 2020-03-28 | End: 2020-03-28

## 2020-03-28 RX ORDER — AMIODARONE HYDROCHLORIDE 400 MG/1
0.5 TABLET ORAL
Qty: 900 | Refills: 0 | Status: DISCONTINUED | OUTPATIENT
Start: 2020-03-28 | End: 2020-03-30

## 2020-03-28 RX ORDER — AMIODARONE HYDROCHLORIDE 400 MG/1
200 TABLET ORAL
Refills: 0 | Status: DISCONTINUED | OUTPATIENT
Start: 2020-03-28 | End: 2020-03-28

## 2020-03-28 RX ADMIN — Medication 40 MILLIEQUIVALENT(S): at 00:17

## 2020-03-28 RX ADMIN — Medication 40 MILLIGRAM(S): at 18:04

## 2020-03-28 RX ADMIN — AMPICILLIN SODIUM AND SULBACTAM SODIUM 200 GRAM(S): 250; 125 INJECTION, POWDER, FOR SUSPENSION INTRAMUSCULAR; INTRAVENOUS at 05:03

## 2020-03-28 RX ADMIN — ENOXAPARIN SODIUM 65 MILLIGRAM(S): 100 INJECTION SUBCUTANEOUS at 18:03

## 2020-03-28 RX ADMIN — AMPICILLIN SODIUM AND SULBACTAM SODIUM 200 GRAM(S): 250; 125 INJECTION, POWDER, FOR SUSPENSION INTRAMUSCULAR; INTRAVENOUS at 00:21

## 2020-03-28 RX ADMIN — AMIODARONE HYDROCHLORIDE 33.3 MG/MIN: 400 TABLET ORAL at 20:25

## 2020-03-28 RX ADMIN — CHLORHEXIDINE GLUCONATE 1 APPLICATION(S): 213 SOLUTION TOPICAL at 05:01

## 2020-03-28 RX ADMIN — Medication 400 MILLIGRAM(S): at 05:04

## 2020-03-28 RX ADMIN — AMPICILLIN SODIUM AND SULBACTAM SODIUM 200 GRAM(S): 250; 125 INJECTION, POWDER, FOR SUSPENSION INTRAMUSCULAR; INTRAVENOUS at 19:33

## 2020-03-28 RX ADMIN — Medication 200 MILLIGRAM(S): at 05:06

## 2020-03-28 RX ADMIN — AMIODARONE HYDROCHLORIDE 200 MILLIGRAM(S): 400 TABLET ORAL at 18:04

## 2020-03-28 RX ADMIN — CHLORHEXIDINE GLUCONATE 15 MILLILITER(S): 213 SOLUTION TOPICAL at 18:03

## 2020-03-28 RX ADMIN — Medication 40 MILLIGRAM(S): at 09:20

## 2020-03-28 RX ADMIN — Medication 1000 MILLIGRAM(S): at 06:40

## 2020-03-28 RX ADMIN — AMIODARONE HYDROCHLORIDE 200 MILLIGRAM(S): 400 TABLET ORAL at 09:20

## 2020-03-28 RX ADMIN — AMPICILLIN SODIUM AND SULBACTAM SODIUM 200 GRAM(S): 250; 125 INJECTION, POWDER, FOR SUSPENSION INTRAMUSCULAR; INTRAVENOUS at 12:38

## 2020-03-28 RX ADMIN — PANTOPRAZOLE SODIUM 40 MILLIGRAM(S): 20 TABLET, DELAYED RELEASE ORAL at 12:38

## 2020-03-28 RX ADMIN — ENOXAPARIN SODIUM 65 MILLIGRAM(S): 100 INJECTION SUBCUTANEOUS at 05:02

## 2020-03-28 RX ADMIN — CHLORHEXIDINE GLUCONATE 15 MILLILITER(S): 213 SOLUTION TOPICAL at 05:02

## 2020-03-28 RX ADMIN — Medication 650 MILLIGRAM(S): at 01:39

## 2020-03-28 NOTE — PROGRESS NOTE ADULT - ASSESSMENT
IMPRESSION:    Acute Hypoxemic Respiratory Failure 2/2 COVID 19  ARDS  H/O Afib on Eliquis    PLAN:    CNS: SAT.  And assure adequate sedation     HEENT: ET care ,oral care    PULMONARY:  HOB @ 45 degrees. Aspiration precautions. Vent changes as follows: ARDS network protocol. Wean O2.      CARDIOVASCULAR: Avoid volume overload.  Lasix 40 mg BID.  D5W 30 cc per hour.  FU QTC.  Amiodarone and keep monitoring QTC. (445 this am)     GI: GI prophylaxis.  OG feeding,  Free H2O    RENAL:  Follow up lytes.  Correct as needed    INFECTIOUS DISEASE: Follow up cultures; Finish Unasyn. Repeat Procalcitonin.  Repeat Cultures.      HEMATOLOGICAL:  DVT prophylaxis / Therapy.  On LMWH     ENDOCRINE:  Follow up FS.  Insulin protocol if needed.    MUSCULOSKELETAL: Bedrest    Lines: TLC     Disposition: MICU monitoring

## 2020-03-28 NOTE — PROGRESS NOTE ADULT - SUBJECTIVE AND OBJECTIVE BOX
LEAHCLIFTON  70y, Female  Allergy: Originally Entered as [Unknown] reaction to [green pepper] (Unknown)  Originally Entered as [Unknown] reaction to [pepper] (Unknown)  sulfa drugs (Unknown)      CHIEF COMPLAINT: covid r/o given fevers, non productive cough (28 Mar 2020 08:09)      INTERVAL EVENTS/HPI  - No acute events overnight  - T(F): , Max: 102.6 (20 @ 02:00)  - Denies any worsening symptoms  - Tolerating medication  - WBC Count: 6.57 K/uL (20 @ 04:30)      ROS  General: Denies fevers, chills, nightsweats, weight loss  HEENT: Denies headache, rhinorrhea, sore throat, eye pain  CV: Denies CP, palpitations  PULM: Denies SOB, cough  GI: Denies abdominal pain, diarrhea  : Denies dysuria, hematuria  MSK: Denies arthralgias  SKIN: Denies rash   NEURO: Denies paresthesias, weakness  PSYCH: Denies depression    FH non-contributory   Social Hx non-contributory    VITALS:  T(F): 99.3, Max: 102.6 (20 @ 02:00)  HR: 108  BP: 110/72  RR: 23Vital Signs Last 24 Hrs  T(C): 37.4 (28 Mar 2020 08:00), Max: 39.2 (28 Mar 2020 02:00)  T(F): 99.3 (28 Mar 2020 08:00), Max: 102.6 (28 Mar 2020 02:00)  HR: 108 (28 Mar 2020 10:15) (92 - 166)  BP: 110/72 (28 Mar 2020 10:15) (63/43 - 158/61)  BP(mean): 78 (28 Mar 2020 10:15) (42 - 94)  RR: 23 (28 Mar 2020 10:15) (13 - 32)  SpO2: 95% (28 Mar 2020 10:15) (87% - 100%)    PHYSICAL EXAM:  Gen: NAD, resting in bed  HEENT: Normocephalic, atraumatic  Neck: supple, no lymphadenopathy  CV: Regular rate & regular rhythm  Lungs: decreased BS at bases, no fremitus  Abdomen: Soft, BS present  Ext: Warm, well perfused  Neuro: non focal, awake  Skin: no rash, no erythema      TESTS & MEASUREMENTS:                        12.1   6.57  )-----------( 289      ( 28 Mar 2020 04:30 )             37.1         144  |  105  |  13  ----------------------------<  172<H>  4.9   |  32  |  0.7    Ca    7.4<L>      28 Mar 2020 04:30  Mg     2.2         TPro  4.5<L>  /  Alb  2.3<L>  /  TBili  0.2  /  DBili  x   /  AST  199<H>  /  ALT  150<H>  /  AlkPhos  148<H>      eGFR if Non African American: 87 mL/min/1.73M2 (20 @ 04:30)  eGFR if African American: 100 mL/min/1.73M2 (20 @ 04:30)  eGFR if Non African American: 91 mL/min/1.73M2 (20 @ 21:30)  eGFR if : 106 mL/min/1.73M2 (20 @ 21:30)    LIVER FUNCTIONS - ( 28 Mar 2020 04:30 )  Alb: 2.3 g/dL / Pro: 4.5 g/dL / ALK PHOS: 148 U/L / ALT: 150 U/L / AST: 199 U/L / GGT: x           Urinalysis Basic - ( 28 Mar 2020 08:19 )    Color: Yellow / Appearance: Clear / S.039 / pH: x  Gluc: x / Ketone: Negative  / Bili: Negative / Urobili: 6 mg/dL   Blood: x / Protein: 100 mg/dL / Nitrite: Negative   Leuk Esterase: Negative / RBC: 2 /HPF / WBC 5 /HPF   Sq Epi: x / Non Sq Epi: 6 /HPF / Bacteria: Negative        Culture - Blood (collected 20 @ 13:15)  Source: .Blood Blood-Peripheral  Final Report (20 @ 20:01):    No growth at 5 days.    Culture - Blood (collected 20 @ 13:15)  Source: .Blood Blood-Peripheral  Final Report (20 @ 20:01):    No growth at 5 days.            INFECTIOUS DISEASES TESTING  HIV-1/2 Combo Result: Nonreact (20 @ 05:00)  MRSA PCR Result.: Negative (20 @ 16:00)  Hepatitis C Virus Interpretation: Nonreact (20 @ 08:12)      RADIOLOGY & ADDITIONAL TESTS:  I have personally reviewed the last Chest xray  CXR      CT      CARDIOLOGY TESTING  12 Lead ECG:   Ventricular Rate 106 BPM    Atrial Rate 106 BPM    P-R Interval 179 ms    QRS Duration 96 ms    Q-T Interval 396 ms    QTC Calculation(Bezet) 526 ms    P Axis 72 degrees    R Axis 78 degrees    T Axis 262 degrees    Diagnosis Line Sinus tachycardia  Left atrial enlargement  ST & T wave abnormality, consider inferior ischemia  ST & T wave abnormality, consider anterolateral ischemia  Abnormal ECG    Confirmed by Mateo Rock ([id number]) on 3/27/2020 5:48:15 AM (20 @ 04:47)  12 Lead ECG:   Ventricular Rate 114 BPM    Atrial Rate 114 BPM    P-R Interval 125 ms    QRS Duration 83 ms    Q-T Interval 296 ms    QTC Calculation(Bezet) 408 ms    P Axis 75 degrees    R Axis 74 degrees    T Axis 87 degrees    Diagnosis Line Sinus tachycardia  Left atrial enlargement  Nonspecific ST and T wave abnormality  Abnormal ECG    Confirmed by Tomas Frey ([id number]) on 3/26/2020 6:27:00 AM (20 @ 04:20)      MEDICATIONS  aMIOdarone    Tablet 200  ampicillin/sulbactam  IVPB 3  chlorhexidine 0.12% Liquid 15  chlorhexidine 4% Liquid 1  dextrose 5%. 1000  enoxaparin Injectable 65  furosemide   Injectable 40  midazolam Infusion 0.08  morphine  Infusion 2  norepinephrine Infusion 0.05  pantoprazole   Suspension 40  vasopressin Infusion 0.04      ANTIBIOTICS:  ampicillin/sulbactam  IVPB 3 Gram(s) IV Intermittent every 6 hours      All available historical data has been reviewed LEAH CLIFTON  70y, Female  Allergy: Originally Entered as [Unknown] reaction to [green pepper] (Unknown)  Originally Entered as [Unknown] reaction to [pepper] (Unknown)  sulfa drugs (Unknown)      CHIEF COMPLAINT: covid r/o given fevers, non productive cough (28 Mar 2020 08:09)      INTERVAL EVENTS/HPI  - No acute events overnight  - T(F): , Max: 102.6 (20 @ 02:00)  - Tolerating medication  - WBC Count: 6.57 K/uL (20 @ 04:30)      ROS  Unable  FH non-contributory   Social Hx non-contributory    VITALS:  T(F): 99.3, Max: 102.6 (20 @ 02:00)  HR: 108  BP: 110/72  RR: 23Vital Signs Last 24 Hrs  T(C): 37.4 (28 Mar 2020 08:00), Max: 39.2 (28 Mar 2020 02:00)  T(F): 99.3 (28 Mar 2020 08:00), Max: 102.6 (28 Mar 2020 02:00)  HR: 108 (28 Mar 2020 10:15) (92 - 166)  BP: 110/72 (28 Mar 2020 10:15) (63/43 - 158/61)  BP(mean): 78 (28 Mar 2020 10:15) (42 - 94)  RR: 23 (28 Mar 2020 10:15) (13 - 32)  SpO2: 95% (28 Mar 2020 10:15) (87% - 100%)    PHYSICAL EXAM:  Gen: NAD, resting in bed  HEENT: Normocephalic, atraumatic  Neck: supple, no lymphadenopathy  CV: Regular rate & regular rhythm  Lungs: decreased BS at bases, no fremitus  Abdomen: Soft, BS present  Ext: Warm, well perfused  Neuro: non focal, awake  Skin: no rash, no erythema      TESTS & MEASUREMENTS:                        12.1   6.57  )-----------( 289      ( 28 Mar 2020 04:30 )             37.1         144  |  105  |  13  ----------------------------<  172<H>  4.9   |  32  |  0.7    Ca    7.4<L>      28 Mar 2020 04:30  Mg     2.2     03-28    TPro  4.5<L>  /  Alb  2.3<L>  /  TBili  0.2  /  DBili  x   /  AST  199<H>  /  ALT  150<H>  /  AlkPhos  148<H>      eGFR if Non African American: 87 mL/min/1.73M2 (20 @ 04:30)  eGFR if African American: 100 mL/min/1.73M2 (20 @ 04:30)  eGFR if Non African American: 91 mL/min/1.73M2 (20 @ 21:30)  eGFR if : 106 mL/min/1.73M2 (20 @ 21:30)    LIVER FUNCTIONS - ( 28 Mar 2020 04:30 )  Alb: 2.3 g/dL / Pro: 4.5 g/dL / ALK PHOS: 148 U/L / ALT: 150 U/L / AST: 199 U/L / GGT: x           Urinalysis Basic - ( 28 Mar 2020 08:19 )    Color: Yellow / Appearance: Clear / S.039 / pH: x  Gluc: x / Ketone: Negative  / Bili: Negative / Urobili: 6 mg/dL   Blood: x / Protein: 100 mg/dL / Nitrite: Negative   Leuk Esterase: Negative / RBC: 2 /HPF / WBC 5 /HPF   Sq Epi: x / Non Sq Epi: 6 /HPF / Bacteria: Negative        Culture - Blood (collected 20 @ 13:15)  Source: .Blood Blood-Peripheral  Final Report (20 @ 20:01):    No growth at 5 days.    Culture - Blood (collected 20 @ 13:15)  Source: .Blood Blood-Peripheral  Final Report (20 @ 20:01):    No growth at 5 days.            INFECTIOUS DISEASES TESTING  HIV-1/2 Combo Result: Nonreact (20 @ 05:00)  MRSA PCR Result.: Negative (20 @ 16:00)  Hepatitis C Virus Interpretation: Nonreact (20 @ 08:12)      RADIOLOGY & ADDITIONAL TESTS:  I have personally reviewed the last Chest xray  CXR      CT      CARDIOLOGY TESTING  12 Lead ECG:   Ventricular Rate 106 BPM    Atrial Rate 106 BPM    P-R Interval 179 ms    QRS Duration 96 ms    Q-T Interval 396 ms    QTC Calculation(Bezet) 526 ms    P Axis 72 degrees    R Axis 78 degrees    T Axis 262 degrees    Diagnosis Line Sinus tachycardia  Left atrial enlargement  ST & T wave abnormality, consider inferior ischemia  ST & T wave abnormality, consider anterolateral ischemia  Abnormal ECG    Confirmed by Mateo Rock ([id number]) on 3/27/2020 5:48:15 AM (20 @ 04:47)  12 Lead ECG:   Ventricular Rate 114 BPM    Atrial Rate 114 BPM    P-R Interval 125 ms    QRS Duration 83 ms    Q-T Interval 296 ms    QTC Calculation(Bezet) 408 ms    P Axis 75 degrees    R Axis 74 degrees    T Axis 87 degrees    Diagnosis Line Sinus tachycardia  Left atrial enlargement  Nonspecific ST and T wave abnormality  Abnormal ECG    Confirmed by Tomas Frey ([id number]) on 3/26/2020 6:27:00 AM (20 @ 04:20)      MEDICATIONS  aMIOdarone    Tablet 200  ampicillin/sulbactam  IVPB 3  chlorhexidine 0.12% Liquid 15  chlorhexidine 4% Liquid 1  dextrose 5%. 1000  enoxaparin Injectable 65  furosemide   Injectable 40  midazolam Infusion 0.08  morphine  Infusion 2  norepinephrine Infusion 0.05  pantoprazole   Suspension 40  vasopressin Infusion 0.04      ANTIBIOTICS:  ampicillin/sulbactam  IVPB 3 Gram(s) IV Intermittent every 6 hours      All available historical data has been reviewed

## 2020-03-28 NOTE — PROGRESS NOTE ADULT - ASSESSMENT
71 y/o female with pmh of a-fib on Eliquis , c/o fever, non-productive cough, body aches and decreased appetite x 7 days. No known sick contacts. No diarrhea. No ABD pain. No H/A, no neck pain. No dysuria/frequency/urgency. Presented with sob  No hemoptysis.  Patient admitted to internal medicine service for acute hypoxemic respiratory failure 2/2 covid19 .Patient intubated in CEU     IMPRESSION:  #Severe septic shock ( ( T 101.3 , /m  on levo with lactate acidemia 1.8  ) secondary to COVID19 with no end organ damage  -s/p Toci 3/24  -CXR progressively worse since admission with bibasilar opacities  On ampicillin/sulbactam  IVPB 3 Gram(s) IV Intermittent every 6 hours    -procal 0.06 > 0.11 which goes against a bacterial process  -no ARF  -ALT mild elevation  -BC 3/22 NG  -inflammatory markers increasing. Ferritin 1047  Pt completed hydroxychloroquine    RECOMMENDATIONS:  -track Ferritin  -HIV test  -prognosis is extremely poor given the imminent possibility of cytokine storm

## 2020-03-28 NOTE — CHART NOTE - NSCHARTNOTEFT_GEN_A_CORE
Patient has known afib but had been in sinus rhythm; is also on levophed for hypotension. At about 10 PM she went into Afib with RVR to 140 and her MAP was 56; she was febrile to 101.7 rectally and recently received Tylenol. I added vasopressin to wean down levophed and ordered a cooling blanket and her HR decreased, but cooling blanket was not available so it was not placed. She was noted to be in RVR to 140's again at midnight, febrile to 102.1 rectally-more Tylenol given, ice packs placed on patient. Nurse then called me just before 2 AM that HR was in 190's; 12-lead EKG showed tachycardia to 160's, irregular rhythm without p waves, consistent with Afib with RVR (confirmed with senior Dr Apple at bedside); MAP in 50's, still on low dose levophed and vasopressin. She was febrile to 102.6 rectally. Her Afib broke spontaneously at bedside and she converted to NSR with tachycardia to 101. MAP remains in 50's.     Will continue to place ice packs on patient and attempt to obtain cooling blanket; administer Tylenol again when next dose is due; uptitrate levophed very cautiously to maintain MAP; continue to monitor closely for uncontrolled Afib. Patient has known afib but had been in sinus rhythm; is also on levophed for hypotension. At about 10 PM she went into Afib with RVR to 140 and her MAP was 56; she was febrile to 101.7 rectally and recently received Tylenol. I added vasopressin to wean down levophed and ordered a cooling blanket and her HR decreased, but cooling blanket was not available so it was not placed. She was noted to be in RVR to 140's again at midnight, febrile to 102.1 rectally-more Tylenol given, ice packs placed on patient. Nurse then called me just before 2 AM that HR was in 160's/170's and spiking to 190; 12-lead EKG showed tachycardia to 160's, irregular rhythm without p waves, consistent with Afib with RVR (confirmed with senior Dr Apple at bedside); MAP in 50's, still on low dose levophed and vasopressin. She was febrile to 102.6 rectally. Her Afib broke spontaneously at bedside and she converted to NSR with tachycardia to 101. MAP remains in 50's.     Will continue to place ice packs on patient and attempt to obtain cooling blanket; administer Tylenol again when next dose is due; uptitrate levophed very cautiously to maintain MAP; continue to monitor closely for uncontrolled Afib. Patient has known afib but had been in sinus rhythm; is also on levophed for hypotension. At about 10 PM she went into Afib with RVR to 140 and her MAP was 56; she was febrile to 101.7 rectally and recently received Tylenol. I added vasopressin to wean down levophed and ordered a cooling blanket and her HR decreased, but cooling blanket was not available so it was not placed. She was noted to be in RVR to 140's again at midnight, febrile to 102.1 rectally-more Tylenol given, ice packs placed on patient. Nurse then called me just before 2 AM that HR was in 160's/170's and spiking to 190; 12-lead EKG showed tachycardia to 160's, irregular rhythm without p waves, consistent with Afib with RVR (confirmed with senior Dr Apple at bedside); MAP in 50's, still on low dose levophed and vasopressin. She was febrile to 102.6 rectally. Her Afib broke spontaneously at bedside and she converted to NSR with tachycardia to 101. MAP remains in 50's.     Will continue to place ice packs on patient and attempt to obtain cooling blanket; administering 1 g IV Tylenol STAT; uptitrate levophed very cautiously to maintain MAP; continue to monitor closely for uncontrolled Afib. Patient has known afib but had been in sinus rhythm; is also on levophed for hypotension. At about 10 PM she went into Afib with RVR to 140 and her MAP was 56; she was febrile to 101.7 rectally and recently received PO Tylenol. I added vasopressin to wean down levophed and ordered a cooling blanket and her HR decreased, but cooling blanket was not available so it was not placed. She was noted to be in RVR to 140's again at midnight, febrile to 102.1 rectally-more PO Tylenol given, ice packs placed on patient. Nurse then called me just before 2 AM that HR was in 160's/170's and spiking to 190; 12-lead EKG showed tachycardia to 160's, irregular rhythm without p waves, consistent with Afib with RVR (confirmed with senior Dr Apple at bedside); MAP in 50's, still on low dose levophed and vasopressin. She was febrile to 102.6 rectally. Her Afib broke spontaneously at bedside and she converted to NSR with tachycardia to 101. MAP remains in 50's.     Will continue to place ice packs on patient and attempt to obtain cooling blanket; administering 1 g IV Tylenol STAT; uptitrate levophed very cautiously to maintain MAP; continue to monitor closely for uncontrolled Afib. No NSAIDs administered secondary to COVID19. Patient has known afib but had been in sinus rhythm; is also on levophed for hypotension. At about 10 PM she went into Afib with RVR to 140 and her MAP was 56; she was febrile to 101.7 rectally and recently received PO Tylenol. I added vasopressin to wean down levophed, gave 5 IV metoprolol, and ordered a cooling blanket and her HR decreased, but cooling blanket was not available so it was not placed. She was noted to be in RVR to 140's again at midnight, febrile to 102.1 rectally-more PO Tylenol given, ice packs placed on patient. Nurse then called me just before 2 AM that HR was in 160's/170's and spiking to 190; 12-lead EKG showed tachycardia to 160's, irregular rhythm without p waves, consistent with Afib with RVR (confirmed with senior Dr Apple at bedside); MAP in 50's, still on low dose levophed and vasopressin. She was febrile to 102.6 rectally. Her Afib broke spontaneously at bedside and she converted to NSR with tachycardia to 101. MAP remains in 50's.     Will continue to place ice packs on patient and attempt to obtain cooling blanket; administering 1 g IV Tylenol STAT; uptitrate levophed very cautiously to maintain MAP; continue to monitor closely for uncontrolled Afib. No NSAIDs administered secondary to COVID19.

## 2020-03-28 NOTE — CHART NOTE - NSCHARTNOTEFT_GEN_A_CORE
Patient hypoxic on fi02=40%  ([id number]%)  A.fib with HR in 100-130 --> Likely due to hypoxia and high grade fevers    Plan:  increase fio2 to 60%  Tylenol as needed  If high rate A.fib consider amiodarone drip vs 1 dose of digoxin  Try to down titrate levophed

## 2020-03-28 NOTE — PROGRESS NOTE ADULT - SUBJECTIVE AND OBJECTIVE BOX
Patient is a 70y old  Female who presents with a chief complaint of covid r/o given fevers, non productive cough (28 Mar 2020 07:37)        Over Night Events:  Remains critically ill on MV.  On Levophed and Vaso.  Sedated.  Rapid Afib last night.  ow better rate control         ROS:     CONSTITUTIONAL:    fever   no chills.  no weight gain   no weight loss    EYES:   no discharge,   no pain  no redness,   no visual changes.    ENT:   Ears: no ear pain and no hearing problems.  Nose: no nasal congestion and no nasal drainage.  Mouth/Throat: no dysphagia,  no hoarseness and no throat pain.  Neck: no lumps, no pain, no stiffness and no swollen glands.     CARDIOVASCULAR:   Per HPI    RESPIRATORY:  Per HPI    GASTROINTESTINAL:    no diarrhea,   no vomiting.    GENITOURINARY:  no dysuria,   no frequency,   no urgency  no hematuria.    MUSCULOSKELETAL:   no back pain,   no musculoskeletal pain,  no weakness.    SKIN:   no jaundice,   no lesions,   no pruritis,   no rashes.    NEURO:   Sedated     PSYCHIATRIC:   Sedated     ALLERGIC/IMMUNOLOGIC:   No active allergic or immunologic issues        PHYSICAL EXAM    ICU Vital Signs Last 24 Hrs  T(C): 38.8 (28 Mar 2020 04:00), Max: 39.2 (28 Mar 2020 02:00)  T(F): 101.9 (28 Mar 2020 04:00), Max: 102.6 (28 Mar 2020 02:00)  HR: 102 (28 Mar 2020 07:00) (94 - 166)  BP: 93/56 (28 Mar 2020 07:00) (63/43 - 134/68)  BP(mean): 73 (28 Mar 2020 07:00) (51 - 94)  ABP: --  ABP(mean): --  RR: 16 (28 Mar 2020 07:00) (14 - 32)  SpO2: 97% (28 Mar 2020 07:00) (87% - 100%)      CONSTITUTIONAL:   Ill appearing.  Well nourished.  NAD    ENT:   Airway patent,   Mouth with normal mucosa.   No thrush    EYES:   Pupils equal,   Round and reactive to light.    CARDIAC:   Normal rate,   Irregular rhythm.    No edema      Vascular:  Normal systolic impulse  No Carotid bruits    RESPIRATORY:   No wheezing  Bilateral BS  Normal chest expansion  Not tachypneic,  No use of accessory muscles    GASTROINTESTINAL:  Abdomen soft,   Non-tender,   No guarding,   + BS    GENITOURINARY  normal genitalia for sex  no edema    MUSCULOSKELETAL:   Range of motion is not limited,  No muscle or joint tenderness  No clubbing, cyanosis    NEUROLOGICAL:   Sedated     SKIN:   Skin normal color for race,   Warm and dry and intact.   No evidence of rash.    PSYCHIATRIC:   Normal mood and affect.   No apparent risk to self or others.    HEME LYMPH:   No cervical  lymphadenopathy.  no inguinal lymphadenopathy      03-27-20 @ 07:01  -  03-28-20 @ 07:00  --------------------------------------------------------  IN:    dextrose 5%.: 630 mL    Free Water: 1200 mL    IV PiggyBack: 400 mL    midazolam Infusion: 75.9 mL    morphine  Infusion: 115 mL    norepinephrine Infusion: 280.6 mL    Osmolite: 900 mL    vasopressin Infusion: 19.2 mL  Total IN: 3620.7 mL    OUT:    Indwelling Catheter - Urethral: 1828 mL  Total OUT: 1828 mL    Total NET: 1792.7 mL          LABS:                            12.1   6.57  )-----------( 289      ( 28 Mar 2020 04:30 )             37.1                                               03-28    144  |  105  |  13  ----------------------------<  172<H>  4.9   |  32  |  0.7    Ca    7.4<L>      28 Mar 2020 04:30  Mg     2.2     03-28    TPro  4.5<L>  /  Alb  2.3<L>  /  TBili  0.2  /  DBili  x   /  AST  199<H>  /  ALT  150<H>  /  AlkPhos  148<H>  03-28                                                                                           LIVER FUNCTIONS - ( 28 Mar 2020 04:30 )  Alb: 2.3 g/dL / Pro: 4.5 g/dL / ALK PHOS: 148 U/L / ALT: 150 U/L / AST: 199 U/L / GGT: x                                                                                               Mode: AC/ CMV (Assist Control/ Continuous Mandatory Ventilation)  RR (machine): 24  TV (machine): 400  FiO2: 60  PEEP: 15  ITime: 1  MAP: 23  PIP: 33                                      ABG - ( 28 Mar 2020 03:43 )  pH, Arterial: 7.37  pH, Blood: x     /  pCO2: 56    /  pO2: 96    / HCO3: 32    / Base Excess: 5.5   /  SaO2: 98    PPL 29 Lac 2.8              MEDICATIONS  (STANDING):  ampicillin/sulbactam  IVPB 3 Gram(s) IV Intermittent every 6 hours  chlorhexidine 0.12% Liquid 15 milliLiter(s) Oral Mucosa two times a day  chlorhexidine 4% Liquid 1 Application(s) Topical <User Schedule>  dextrose 5%. 1000 milliLiter(s) (30 mL/Hr) IV Continuous <Continuous>  enoxaparin Injectable 65 milliGRAM(s) SubCutaneous every 12 hours  midazolam Infusion 0.08 mG/kG/Hr (5.26 mL/Hr) IV Continuous <Continuous>  morphine  Infusion 2 mG/Hr (2 mL/Hr) IV Continuous <Continuous>  norepinephrine Infusion 0.05 MICROgram(s)/kG/Min (6.14 mL/Hr) IV Continuous <Continuous>  pantoprazole   Suspension 40 milliGRAM(s) Oral daily  vasopressin Infusion 0.04 Unit(s)/Min (2.4 mL/Hr) IV Continuous <Continuous>    MEDICATIONS  (PRN):      New X-rays reviewed:                                                                                  ECHO    CXR interpreted by me:

## 2020-03-28 NOTE — PROGRESS NOTE ADULT - SUBJECTIVE AND OBJECTIVE BOX
CLIFTON LYNN 70y Female  MRN#: [id number]       SUBJECTIVE  Patient is a 70y old Female who presents with a chief complaint of covid r/o given fevers, non productive cough (27 Mar 2020 11:36)  Currently admitted to medicine with the primary diagnosis of Sepsis  Today is hospital day 6d, and this morning she is intubated, sedated    OBJECTIVE  PAST MEDICAL & SURGICAL HISTORY  Afib    ALLERGIES:  Originally Entered as [Unknown] reaction to [green pepper] (Unknown)  Originally Entered as [Unknown] reaction to [pepper] (Unknown)  sulfa drugs (Unknown)    MEDICATIONS:  STANDING MEDICATIONS  ampicillin/sulbactam  IVPB 3 Gram(s) IV Intermittent every 6 hours  chlorhexidine 0.12% Liquid 15 milliLiter(s) Oral Mucosa two times a day  chlorhexidine 4% Liquid 1 Application(s) Topical <User Schedule>  dextrose 5%. 1000 milliLiter(s) IV Continuous <Continuous>  enoxaparin Injectable 65 milliGRAM(s) SubCutaneous every 12 hours  midazolam Infusion 0.08 mG/kG/Hr IV Continuous <Continuous>  morphine  Infusion 2 mG/Hr IV Continuous <Continuous>  norepinephrine Infusion 0.05 MICROgram(s)/kG/Min IV Continuous <Continuous>  pantoprazole   Suspension 40 milliGRAM(s) Oral daily  vasopressin Infusion 0.04 Unit(s)/Min IV Continuous <Continuous>    PRN MEDICATIONS      VITAL SIGNS: Last 24 Hours  T(C): 38.8 (28 Mar 2020 04:00), Max: 39.2 (28 Mar 2020 02:00)  T(F): 101.9 (28 Mar 2020 04:00), Max: 102.6 (28 Mar 2020 02:00)  HR: 102 (28 Mar 2020 07:00) (89 - 166)  BP: 89/53 (28 Mar 2020 06:45) (63/43 - 134/68)  BP(mean): 64 (28 Mar 2020 06:45) (51 - 94)  RR: 16 (28 Mar 2020 07:00) (14 - 32)  SpO2: 97% (28 Mar 2020 07:00) (87% - 100%)    LABS:                        12.1   6.57  )-----------( 289      ( 28 Mar 2020 04:30 )             37.1     03-28    144  |  105  |  13  ----------------------------<  172<H>  4.9   |  32  |  0.7    Ca    7.4<L>      28 Mar 2020 04:30  Mg     2.2     03-28    TPro  4.5<L>  /  Alb  2.3<L>  /  TBili  0.2  /  DBili  x   /  AST  199<H>  /  ALT  150<H>  /  AlkPhos  148<H>  03-28    ABG - ( 28 Mar 2020 03:43 )  pH, Arterial: 7.37  pH, Blood: x     /  pCO2: 56    /  pO2: 96    / HCO3: 32    / Base Excess: 5.5   /  SaO2: 98          PHYSICAL EXAM:    GENERAL: intubated/sedated, does not withdraw from pain  HEENT:  Atraumatic, Normocephalic. EOMI, PERRLA, conjunctiva and sclera clear, No JVD  PULMONARY: Clear to auscultation bilaterally; No wheeze  CARDIOVASCULAR: Regular rate and rhythm; No murmurs, rubs, or gallops  GASTROINTESTINAL: Soft, Nontender, Nondistended; Bowel sounds present  MUSCULOSKELETAL:  2+ Peripheral Pulses, No clubbing, cyanosis, or edema  NEUROLOGY: non-focal  SKIN: No rash    ASSESSMENT & PLAN    69 y/o female with pmh of a-fib, c/o fever, non-productive cough, body aches and decreased appetite x 7 days. No known sick contacts. No diarrhea. No ABD pain.   Patient was found to be covid +  She required intubated on 3/24/2020 for hypoxic respiratory failure.     # hypoxic respiratory failure due to COVID -19 +, requiring intubation  CXR: bilateral reticular infiltrates - improving on the right side  Still requiring same o2 on ventilator  On levophed  - on Unasyn for possible aspiration PNA (start on 3/24) for 5 days  - s/p 1 dose of toculizumab  - on plaqunil (start 3/23), for 1 more dose then stop  - monitor QTs. Qt [id number]. Keep Mg>2 and k>4  - on low dose Levophed, tapering down     # volume overload +2L  - will give 1 dose of lasix and determine if o2 requirements decrease    # Chronic Afib  - now in NSR  - BB stopped as patient is requiring pressors   - rate is still well controlled  - on Lovenox therapeutic dose    # mild hypernatremia  - free water 300mg q 6hrs   - will start d5w and montior     Diet: tube feeds  Lines: left IJ  Garcia present  DVT prophylaxis: Lovenox theraputic  Ambulation: bedrest CLIFTON LYNN 70y Female  MRN#: [id number]       SUBJECTIVE  Patient is a 70y old Female who presents with a chief complaint of covid r/o given fevers, non productive cough (27 Mar 2020 11:36)  Currently admitted to medicine with the primary diagnosis of Sepsis  Today is hospital day 6d, and this morning she is intubated, sedated    OBJECTIVE  PAST MEDICAL & SURGICAL HISTORY  Afib    ALLERGIES:  Originally Entered as [Unknown] reaction to [green pepper] (Unknown)  Originally Entered as [Unknown] reaction to [pepper] (Unknown)  sulfa drugs (Unknown)    MEDICATIONS:  STANDING MEDICATIONS  ampicillin/sulbactam  IVPB 3 Gram(s) IV Intermittent every 6 hours  chlorhexidine 0.12% Liquid 15 milliLiter(s) Oral Mucosa two times a day  chlorhexidine 4% Liquid 1 Application(s) Topical <User Schedule>  dextrose 5%. 1000 milliLiter(s) IV Continuous <Continuous>  enoxaparin Injectable 65 milliGRAM(s) SubCutaneous every 12 hours  midazolam Infusion 0.08 mG/kG/Hr IV Continuous <Continuous>  morphine  Infusion 2 mG/Hr IV Continuous <Continuous>  norepinephrine Infusion 0.05 MICROgram(s)/kG/Min IV Continuous <Continuous>  pantoprazole   Suspension 40 milliGRAM(s) Oral daily  vasopressin Infusion 0.04 Unit(s)/Min IV Continuous <Continuous>    PRN MEDICATIONS      VITAL SIGNS: Last 24 Hours  T(C): 38.8 (28 Mar 2020 04:00), Max: 39.2 (28 Mar 2020 02:00)  T(F): 101.9 (28 Mar 2020 04:00), Max: 102.6 (28 Mar 2020 02:00)  HR: 102 (28 Mar 2020 07:00) (89 - 166)  BP: 89/53 (28 Mar 2020 06:45) (63/43 - 134/68)  BP(mean): 64 (28 Mar 2020 06:45) (51 - 94)  RR: 16 (28 Mar 2020 07:00) (14 - 32)  SpO2: 97% (28 Mar 2020 07:00) (87% - 100%)    LABS:                        12.1   6.57  )-----------( 289      ( 28 Mar 2020 04:30 )             37.1     03-28    144  |  105  |  13  ----------------------------<  172<H>  4.9   |  32  |  0.7    Ca    7.4<L>      28 Mar 2020 04:30  Mg     2.2     03-28    TPro  4.5<L>  /  Alb  2.3<L>  /  TBili  0.2  /  DBili  x   /  AST  199<H>  /  ALT  150<H>  /  AlkPhos  148<H>  03-28    ABG - ( 28 Mar 2020 03:43 )  pH, Arterial: 7.37  pH, Blood: x     /  pCO2: 56    /  pO2: 96    / HCO3: 32    / Base Excess: 5.5   /  SaO2: 98          PHYSICAL EXAM:    GENERAL: intubated/sedated, does not withdraw from pain  HEENT:  Atraumatic, Normocephalic. EOMI, PERRLA, conjunctiva and sclera clear, No JVD  PULMONARY: Clear to auscultation bilaterally; No wheeze  CARDIOVASCULAR: Regular rate and rhythm; No murmurs, rubs, or gallops  GASTROINTESTINAL: Soft, Nontender, Nondistended; Bowel sounds present  MUSCULOSKELETAL:  2+ Peripheral Pulses, No clubbing, cyanosis, or edema  NEUROLOGY: non-focal  SKIN: No rash    ASSESSMENT & PLAN    71 y/o female with pmh of a-fib, c/o fever, non-productive cough, body aches and decreased appetite x 7 days. No known sick contacts. No diarrhea. No ABD pain.   Patient was found to be covid +  She required intubated on 3/24/2020 for hypoxic respiratory failure.     # hypoxic respiratory failure due to COVID -19 +, requiring intubation  CXR: bilateral reticular infiltrates - improving on the right side  Still requiring same o2 on ventilator  On levophed and vasopressin  - on Unasyn for possible aspiration PNA (start on 3/24) for 5 days  - s/p 1 dose of toculizumab  - on plaqunil (start 3/23), for 1 more dose then stop  - monitor QTs. Qt [id number]. Keep Mg>2 and k>4  - on low dose Levophed, tapering down given addition of vasopressin  - repeat Bcx today for continued high grade fevers.     # high rate A.fib  - resolved  - amiodarone started today    # volume overload +2L  - will start lasix 40mg IV BID    # Chronic Afib  - now in NSR  - BB stopped as patient is requiring pressors   - rate is still well controlled  - on Lovenox therapeutic dose    # mild hypernatremia  - free water 300mg q 6hrs   - will start d5w and montior   - if Na appropriate at 6pm, will dc d5w    Diet: tube feeds  Lines: left IJ  Garcia present  DVT prophylaxis: Lovenox theraputic  Ambulation: bedrest

## 2020-03-29 LAB
ALBUMIN SERPL ELPH-MCNC: 2.4 G/DL — LOW (ref 3.5–5.2)
ALP SERPL-CCNC: 144 U/L — HIGH (ref 30–115)
ALT FLD-CCNC: 184 U/L — HIGH (ref 0–41)
ANION GAP SERPL CALC-SCNC: 10 MMOL/L — SIGNIFICANT CHANGE UP (ref 7–14)
AST SERPL-CCNC: 164 U/L — HIGH (ref 0–41)
BILIRUB DIRECT SERPL-MCNC: <0.2 MG/DL — SIGNIFICANT CHANGE UP (ref 0–0.2)
BILIRUB INDIRECT FLD-MCNC: >0 MG/DL — LOW (ref 0.2–1.2)
BILIRUB SERPL-MCNC: 0.2 MG/DL — SIGNIFICANT CHANGE UP (ref 0.2–1.2)
BUN SERPL-MCNC: 18 MG/DL — SIGNIFICANT CHANGE UP (ref 10–20)
CALCIUM SERPL-MCNC: 7.2 MG/DL — LOW (ref 8.5–10.1)
CHLORIDE SERPL-SCNC: 99 MMOL/L — SIGNIFICANT CHANGE UP (ref 98–110)
CO2 SERPL-SCNC: 32 MMOL/L — SIGNIFICANT CHANGE UP (ref 17–32)
CREAT SERPL-MCNC: 0.7 MG/DL — SIGNIFICANT CHANGE UP (ref 0.7–1.5)
CULTURE RESULTS: NO GROWTH — SIGNIFICANT CHANGE UP
GLUCOSE SERPL-MCNC: 153 MG/DL — HIGH (ref 70–99)
HCT VFR BLD CALC: 36.9 % — LOW (ref 37–47)
HGB BLD-MCNC: 11.8 G/DL — LOW (ref 12–16)
MCHC RBC-ENTMCNC: 29.3 PG — SIGNIFICANT CHANGE UP (ref 27–31)
MCHC RBC-ENTMCNC: 32 G/DL — SIGNIFICANT CHANGE UP (ref 32–37)
MCV RBC AUTO: 91.6 FL — SIGNIFICANT CHANGE UP (ref 81–99)
NRBC # BLD: 0 /100 WBCS — SIGNIFICANT CHANGE UP (ref 0–0)
PLATELET # BLD AUTO: 283 K/UL — SIGNIFICANT CHANGE UP (ref 130–400)
POTASSIUM SERPL-MCNC: 4.3 MMOL/L — SIGNIFICANT CHANGE UP (ref 3.5–5)
POTASSIUM SERPL-SCNC: 4.3 MMOL/L — SIGNIFICANT CHANGE UP (ref 3.5–5)
PROT SERPL-MCNC: 4.5 G/DL — LOW (ref 6–8)
RBC # BLD: 4.03 M/UL — LOW (ref 4.2–5.4)
RBC # FLD: 14.7 % — HIGH (ref 11.5–14.5)
SODIUM SERPL-SCNC: 141 MMOL/L — SIGNIFICANT CHANGE UP (ref 135–146)
SPECIMEN SOURCE: SIGNIFICANT CHANGE UP
WBC # BLD: 8.06 K/UL — SIGNIFICANT CHANGE UP (ref 4.8–10.8)
WBC # FLD AUTO: 8.06 K/UL — SIGNIFICANT CHANGE UP (ref 4.8–10.8)

## 2020-03-29 PROCEDURE — 93010 ELECTROCARDIOGRAM REPORT: CPT | Mod: 76

## 2020-03-29 PROCEDURE — 71045 X-RAY EXAM CHEST 1 VIEW: CPT | Mod: 26

## 2020-03-29 RX ORDER — MIDAZOLAM HYDROCHLORIDE 1 MG/ML
0.08 INJECTION, SOLUTION INTRAMUSCULAR; INTRAVENOUS
Qty: 100 | Refills: 0 | Status: DISCONTINUED | OUTPATIENT
Start: 2020-03-29 | End: 2020-03-31

## 2020-03-29 RX ORDER — ASCORBIC ACID 60 MG
500 TABLET,CHEWABLE ORAL THREE TIMES A DAY
Refills: 0 | Status: DISCONTINUED | OUTPATIENT
Start: 2020-03-29 | End: 2020-04-02

## 2020-03-29 RX ORDER — ACETAMINOPHEN 500 MG
650 TABLET ORAL EVERY 6 HOURS
Refills: 0 | Status: DISCONTINUED | OUTPATIENT
Start: 2020-03-29 | End: 2020-04-16

## 2020-03-29 RX ORDER — MORPHINE SULFATE 50 MG/1
2 CAPSULE, EXTENDED RELEASE ORAL
Qty: 100 | Refills: 0 | Status: DISCONTINUED | OUTPATIENT
Start: 2020-03-29 | End: 2020-04-05

## 2020-03-29 RX ORDER — AMIODARONE HYDROCHLORIDE 400 MG/1
200 TABLET ORAL
Refills: 0 | Status: DISCONTINUED | OUTPATIENT
Start: 2020-03-29 | End: 2020-04-05

## 2020-03-29 RX ORDER — ASCORBIC ACID 60 MG
3000 TABLET,CHEWABLE ORAL DAILY
Refills: 0 | Status: DISCONTINUED | OUTPATIENT
Start: 2020-03-29 | End: 2020-03-29

## 2020-03-29 RX ORDER — POLYETHYLENE GLYCOL 3350 17 G/17G
17 POWDER, FOR SOLUTION ORAL DAILY
Refills: 0 | Status: DISCONTINUED | OUTPATIENT
Start: 2020-03-29 | End: 2020-04-16

## 2020-03-29 RX ORDER — THIAMINE MONONITRATE (VIT B1) 100 MG
200 TABLET ORAL DAILY
Refills: 0 | Status: DISCONTINUED | OUTPATIENT
Start: 2020-03-29 | End: 2020-04-02

## 2020-03-29 RX ORDER — SENNA PLUS 8.6 MG/1
2 TABLET ORAL AT BEDTIME
Refills: 0 | Status: DISCONTINUED | OUTPATIENT
Start: 2020-03-29 | End: 2020-04-16

## 2020-03-29 RX ADMIN — CHLORHEXIDINE GLUCONATE 15 MILLILITER(S): 213 SOLUTION TOPICAL at 16:59

## 2020-03-29 RX ADMIN — AMPICILLIN SODIUM AND SULBACTAM SODIUM 200 GRAM(S): 250; 125 INJECTION, POWDER, FOR SUSPENSION INTRAMUSCULAR; INTRAVENOUS at 05:06

## 2020-03-29 RX ADMIN — AMIODARONE HYDROCHLORIDE 200 MILLIGRAM(S): 400 TABLET ORAL at 21:41

## 2020-03-29 RX ADMIN — POLYETHYLENE GLYCOL 3350 17 GRAM(S): 17 POWDER, FOR SOLUTION ORAL at 13:31

## 2020-03-29 RX ADMIN — Medication 650 MILLIGRAM(S): at 21:31

## 2020-03-29 RX ADMIN — CHLORHEXIDINE GLUCONATE 15 MILLILITER(S): 213 SOLUTION TOPICAL at 05:04

## 2020-03-29 RX ADMIN — Medication 650 MILLIGRAM(S): at 21:00

## 2020-03-29 RX ADMIN — ENOXAPARIN SODIUM 65 MILLIGRAM(S): 100 INJECTION SUBCUTANEOUS at 17:00

## 2020-03-29 RX ADMIN — ENOXAPARIN SODIUM 65 MILLIGRAM(S): 100 INJECTION SUBCUTANEOUS at 05:04

## 2020-03-29 RX ADMIN — Medication 500 MILLIGRAM(S): at 21:41

## 2020-03-29 RX ADMIN — PANTOPRAZOLE SODIUM 40 MILLIGRAM(S): 20 TABLET, DELAYED RELEASE ORAL at 13:31

## 2020-03-29 RX ADMIN — Medication 60 MILLIGRAM(S): at 13:31

## 2020-03-29 RX ADMIN — CHLORHEXIDINE GLUCONATE 1 APPLICATION(S): 213 SOLUTION TOPICAL at 05:04

## 2020-03-29 NOTE — PROGRESS NOTE ADULT - SUBJECTIVE AND OBJECTIVE BOX
Patient is a 70y old  Female who presents with a chief complaint of covid r/o given fevers, non productive cough (28 Mar 2020 11:28)        Over Night Events:  Remains critically ill on MV.  On Levophed and Vasopressin.  Sedated         ROS:     CONSTITUTIONAL:   no fever   no chills.      EYES:   no discharge,   no redness,       ENT:   Nose: No nasal drainage.  Mouth/Throat: no dysphagia,  no hoarseness and no throat pain.     CARDIOVASCULAR:   Per HPI     RESPIRATORY:  Per HPI     GASTROINTESTINAL:   no diarrhea,   no vomiting.    GENITOURINARY:  no dysuria,   no hematuria.    MUSCULOSKELETAL:   no back pain,   no musculoskeletal pain,    SKIN:   no jaundice,   no lesions,   no rashes.    NEURO:   Sedated     PSYCHIATRIC:   no known mental health issues  Sedated     ALLERGIC/IMMUNOLOGIC:   No active allergic or immunologic issues        PHYSICAL EXAM    ICU Vital Signs Last 24 Hrs  T(C): 37.3 (29 Mar 2020 04:00), Max: 37.7 (28 Mar 2020 18:00)  T(F): 99.2 (29 Mar 2020 04:00), Max: 99.9 (28 Mar 2020 18:00)  HR: 88 (29 Mar 2020 10:30) (74 - 136)  BP: 132/59 (29 Mar 2020 10:15) (85/51 - 144/64)  BP(mean): 85 (29 Mar 2020 10:15) (59 - 95)  ABP: --  ABP(mean): --  RR: 14 (29 Mar 2020 10:30) (12 - 32)  SpO2: 97% (29 Mar 2020 10:30) (76% - 100%)      CONSTITUTIONAL:  Well nourished.  NAD    ENT:   Airway patent,   Mouth with normal mucosa.   No thrush    EYES:   Pupils equal,   Round and reactive to light.    CARDIAC:   Normal rate,   Regular rhythm.    No edema      Vascular:  Normal systolic impulse  No Carotid bruits    RESPIRATORY:   No wheezing  Bilateral BS  Normal chest expansion  Not tachypneic,  No use of accessory muscles    GASTROINTESTINAL:  Abdomen soft,   Non-tender,   No guarding,   + BS    GENITOURINARY  normal genitalia for sex   edema    MUSCULOSKELETAL:   Range of motion is not limited,  No clubbing, cyanosis    NEUROLOGICAL:   Sedated  Withdraws to pain     SKIN:   Skin normal color for race,   Warm and dry and intact.   No evidence of rash.    PSYCHIATRIC:   Sedated.     HEMATOLOGICAL:  No cervical  lymphadenopathy.  no inguinal lymphadenopathy      20 @ 07:20 @ 07:00  --------------------------------------------------------  IN:    amiodarone Infusion: 282.8 mL    dextrose 5%.: 690 mL    Free Water: 300 mL    IV PiggyBack: 100 mL    midazolam Infusion: 61.6 mL    midazolam Infusion: 3 mL    morphine  Infusion: 3 mL    morphine  Infusion: 89.5 mL    norepinephrine Infusion: 288.1 mL    Osmolite: 900 mL    vasopressin Infusion: 57.6 mL  Total IN: 2775.6 mL    OUT:    Indwelling Catheter - Urethral: 1474 mL  Total OUT: 1474 mL    Total NET: 1301.6 mL      20 @ 07:20 @ 10:40  --------------------------------------------------------  IN:    amiodarone Infusion: 49.8 mL    dextrose 5%.: 90 mL    midazolam Infusion: 7.3 mL    morphine  Infusion: 8 mL    norepinephrine Infusion: 28.2 mL    vasopressin Infusion: 7.2 mL  Total IN: 190.5 mL    OUT:    Indwelling Catheter - Urethral: 95 mL  Total OUT: 95 mL    Total NET: 95.5 mL          LABS:                            11.8   8.06  )-----------( 283      ( 29 Mar 2020 04:30 )             36.9                                                   141  |  99  |  18  ----------------------------<  153<H>  4.3   |  32  |  0.7    Ca    7.2<L>      29 Mar 2020 04:30  Mg     2.0         TPro  4.5<L>  /  Alb  2.4<L>  /  TBili  0.2  /  DBili  <0.2  /  AST  164<H>  /  ALT  184<H>  /  AlkPhos  144<H>                                               Urinalysis Basic - ( 28 Mar 2020 08:19 )    Color: Yellow / Appearance: Clear / S.039 / pH: x  Gluc: x / Ketone: Negative  / Bili: Negative / Urobili: 6 mg/dL   Blood: x / Protein: 100 mg/dL / Nitrite: Negative   Leuk Esterase: Negative / RBC: 2 /HPF / WBC 5 /HPF   Sq Epi: x / Non Sq Epi: 6 /HPF / Bacteria: Negative        CARDIAC MARKERS ( 28 Mar 2020 20:50 )  x     / x     / 261 U/L / x     / x                                                LIVER FUNCTIONS - ( 29 Mar 2020 04:30 )  Alb: 2.4 g/dL / Pro: 4.5 g/dL / ALK PHOS: 144 U/L / ALT: 184 U/L / AST: 164 U/L / GGT: x                                                                                               Mode: AC/ CMV (Assist Control/ Continuous Mandatory Ventilation)  RR (machine): 24  TV (machine): 400  FiO2: 80  PEEP: 15  ITime: 1  MAP: 24  PIP: 31                                      ABG - ( 29 Mar 2020 03:34 )  pH, Arterial: 7.42  pH, Blood: x     /  pCO2: 58    /  pO2: 117   / HCO3: 37    / Base Excess: 11.1  /  SaO2: 99    Lac 2.1  PPL 30              MEDICATIONS  (STANDING):  aMIOdarone Infusion 1 mG/Min (33.3 mL/Hr) IV Continuous <Continuous>  aMIOdarone Infusion 0.5 mG/Min (16.7 mL/Hr) IV Continuous <Continuous>  chlorhexidine 0.12% Liquid 15 milliLiter(s) Oral Mucosa two times a day  chlorhexidine 4% Liquid 1 Application(s) Topical <User Schedule>  dextrose 5%. 1000 milliLiter(s) (30 mL/Hr) IV Continuous <Continuous>  enoxaparin Injectable 65 milliGRAM(s) SubCutaneous every 12 hours  furosemide   Injectable 40 milliGRAM(s) IV Push two times a day  midazolam Infusion 0.08 mG/kG/Hr (5.26 mL/Hr) IV Continuous <Continuous>  morphine  Infusion 2 mG/Hr (2 mL/Hr) IV Continuous <Continuous>  norepinephrine Infusion 0.05 MICROgram(s)/kG/Min (6.14 mL/Hr) IV Continuous <Continuous>  pantoprazole   Suspension 40 milliGRAM(s) Oral daily  vasopressin Infusion 0.04 Unit(s)/Min (2.4 mL/Hr) IV Continuous <Continuous>    MEDICATIONS  (PRN):  acetaminophen   Tablet .. 650 milliGRAM(s) Oral every 6 hours PRN Temp greater or equal to 38C (100.4F)      New X-rays reviewed:                                                                                  ECHO    CXR interpreted by me:

## 2020-03-29 NOTE — PROGRESS NOTE ADULT - ASSESSMENT
IMPRESSION:    Acute Hypoxemic Respiratory Failure 2/2 COVID 19  ARDS  H/O Afib on Eliquis    PLAN:    CNS: Continue sedation      HEENT: ET care ,oral care    PULMONARY:  HOB @ 45 degrees. Aspiration precautions. Vent changes as follows: ARDS network protocol/.  FiO2 70.      CARDIOVASCULAR: Avoid volume overload.  DC Lasix.  FU QTC.  Amiodarone oral.  Wean Levophed      GI: GI prophylaxis.  OG feeding, Bowel regimen.      RENAL:  Follow up lytes.  Correct as needed    INFECTIOUS DISEASE: Follow up cultures.  Finish Unasyn course     HEMATOLOGICAL:  DVT prophylaxis / Therapy.  On LMWH     ENDOCRINE:  Follow up FS.  Insulin protocol if needed.    MUSCULOSKELETAL: Bedrest    Lines: TLC     Disposition: MICU monitoring

## 2020-03-30 LAB
-  COAGULASE NEGATIVE STAPHYLOCOCCUS: SIGNIFICANT CHANGE UP
ALBUMIN SERPL ELPH-MCNC: 2.8 G/DL — LOW (ref 3.5–5.2)
ALP SERPL-CCNC: 148 U/L — HIGH (ref 30–115)
ALT FLD-CCNC: 168 U/L — HIGH (ref 0–41)
ANION GAP SERPL CALC-SCNC: 14 MMOL/L — SIGNIFICANT CHANGE UP (ref 7–14)
AST SERPL-CCNC: 94 U/L — HIGH (ref 0–41)
BILIRUB SERPL-MCNC: 0.2 MG/DL — SIGNIFICANT CHANGE UP (ref 0.2–1.2)
BLD GP AB SCN SERPL QL: SIGNIFICANT CHANGE UP
BUN SERPL-MCNC: 26 MG/DL — HIGH (ref 10–20)
CALCIUM SERPL-MCNC: 7.9 MG/DL — LOW (ref 8.5–10.1)
CHLORIDE SERPL-SCNC: 99 MMOL/L — SIGNIFICANT CHANGE UP (ref 98–110)
CO2 SERPL-SCNC: 31 MMOL/L — SIGNIFICANT CHANGE UP (ref 17–32)
CREAT SERPL-MCNC: 0.6 MG/DL — LOW (ref 0.7–1.5)
CRP SERPL-MCNC: 1.71 MG/DL — HIGH (ref 0–0.4)
ERYTHROCYTE [SEDIMENTATION RATE] IN BLOOD: 4 MM/HR — SIGNIFICANT CHANGE UP (ref 0–20)
FERRITIN SERPL-MCNC: 1131 NG/ML — HIGH (ref 15–150)
FERRITIN SERPL-MCNC: 1548 NG/ML — HIGH (ref 15–150)
GLUCOSE BLDC GLUCOMTR-MCNC: 195 MG/DL — HIGH (ref 70–99)
GLUCOSE SERPL-MCNC: 154 MG/DL — HIGH (ref 70–99)
GRAM STN FLD: SIGNIFICANT CHANGE UP
HCT VFR BLD CALC: 38.2 % — SIGNIFICANT CHANGE UP (ref 37–47)
HGB BLD-MCNC: 11.9 G/DL — LOW (ref 12–16)
HIV 1+2 AB+HIV1 P24 AG SERPL QL IA: SIGNIFICANT CHANGE UP
MCHC RBC-ENTMCNC: 28.3 PG — SIGNIFICANT CHANGE UP (ref 27–31)
MCHC RBC-ENTMCNC: 31.2 G/DL — LOW (ref 32–37)
MCV RBC AUTO: 91 FL — SIGNIFICANT CHANGE UP (ref 81–99)
METHOD TYPE: SIGNIFICANT CHANGE UP
NRBC # BLD: 0 /100 WBCS — SIGNIFICANT CHANGE UP (ref 0–0)
NT-PROBNP SERPL-SCNC: 481 PG/ML — HIGH (ref 0–300)
PLATELET # BLD AUTO: 330 K/UL — SIGNIFICANT CHANGE UP (ref 130–400)
POTASSIUM SERPL-MCNC: 5.1 MMOL/L — HIGH (ref 3.5–5)
POTASSIUM SERPL-SCNC: 5.1 MMOL/L — HIGH (ref 3.5–5)
PROCALCITONIN SERPL-MCNC: 0.09 NG/ML — SIGNIFICANT CHANGE UP (ref 0.02–0.1)
PROT SERPL-MCNC: 5.1 G/DL — LOW (ref 6–8)
RBC # BLD: 4.2 M/UL — SIGNIFICANT CHANGE UP (ref 4.2–5.4)
RBC # FLD: 14.6 % — HIGH (ref 11.5–14.5)
SODIUM SERPL-SCNC: 144 MMOL/L — SIGNIFICANT CHANGE UP (ref 135–146)
TROPONIN T SERPL-MCNC: <0.01 NG/ML — SIGNIFICANT CHANGE UP
WBC # BLD: 9.55 K/UL — SIGNIFICANT CHANGE UP (ref 4.8–10.8)
WBC # FLD AUTO: 9.55 K/UL — SIGNIFICANT CHANGE UP (ref 4.8–10.8)

## 2020-03-30 PROCEDURE — 93306 TTE W/DOPPLER COMPLETE: CPT | Mod: 26

## 2020-03-30 PROCEDURE — 71045 X-RAY EXAM CHEST 1 VIEW: CPT | Mod: 26

## 2020-03-30 RX ORDER — INSULIN LISPRO 100/ML
1 VIAL (ML) SUBCUTANEOUS ONCE
Refills: 0 | Status: COMPLETED | OUTPATIENT
Start: 2020-03-30 | End: 2020-03-30

## 2020-03-30 RX ADMIN — POLYETHYLENE GLYCOL 3350 17 GRAM(S): 17 POWDER, FOR SOLUTION ORAL at 12:19

## 2020-03-30 RX ADMIN — Medication 500 MILLIGRAM(S): at 04:23

## 2020-03-30 RX ADMIN — Medication 60 MILLIGRAM(S): at 16:07

## 2020-03-30 RX ADMIN — Medication 60 MILLIGRAM(S): at 00:29

## 2020-03-30 RX ADMIN — CHLORHEXIDINE GLUCONATE 1 APPLICATION(S): 213 SOLUTION TOPICAL at 04:23

## 2020-03-30 RX ADMIN — Medication 60 MILLIGRAM(S): at 04:22

## 2020-03-30 RX ADMIN — Medication 102 MILLIGRAM(S): at 15:01

## 2020-03-30 RX ADMIN — AMIODARONE HYDROCHLORIDE 200 MILLIGRAM(S): 400 TABLET ORAL at 04:22

## 2020-03-30 RX ADMIN — AMIODARONE HYDROCHLORIDE 200 MILLIGRAM(S): 400 TABLET ORAL at 16:06

## 2020-03-30 RX ADMIN — ENOXAPARIN SODIUM 65 MILLIGRAM(S): 100 INJECTION SUBCUTANEOUS at 16:07

## 2020-03-30 RX ADMIN — ENOXAPARIN SODIUM 65 MILLIGRAM(S): 100 INJECTION SUBCUTANEOUS at 04:23

## 2020-03-30 RX ADMIN — CHLORHEXIDINE GLUCONATE 15 MILLILITER(S): 213 SOLUTION TOPICAL at 16:07

## 2020-03-30 RX ADMIN — PANTOPRAZOLE SODIUM 40 MILLIGRAM(S): 20 TABLET, DELAYED RELEASE ORAL at 12:19

## 2020-03-30 RX ADMIN — SENNA PLUS 2 TABLET(S): 8.6 TABLET ORAL at 00:29

## 2020-03-30 RX ADMIN — CHLORHEXIDINE GLUCONATE 15 MILLILITER(S): 213 SOLUTION TOPICAL at 04:24

## 2020-03-30 RX ADMIN — Medication 1 UNIT(S): at 23:21

## 2020-03-30 NOTE — PROGRESS NOTE ADULT - SUBJECTIVE AND OBJECTIVE BOX
CLIFTON LYNN 70y Female  MRN#: [id number]     SUBJECTIVE  Patient is a 70y old Female who presents with a chief complaint of covid r/o given fevers, non productive cough (30 Mar 2020 08:08)  Currently admitted to medicine with the primary diagnosis of Sepsis    Today is hospital day 8d, and this morning she is intubated, sedated.     OBJECTIVE  PAST MEDICAL & SURGICAL HISTORY  Afib    ALLERGIES:  Originally Entered as [Unknown] reaction to [green pepper] (Unknown)  Originally Entered as [Unknown] reaction to [pepper] (Unknown)  sulfa drugs (Unknown)    MEDICATIONS:  STANDING MEDICATIONS  aMIOdarone    Tablet 200 milliGRAM(s) Oral two times a day  ascorbic acid 500 milliGRAM(s) Oral three times a day  chlorhexidine 0.12% Liquid 15 milliLiter(s) Oral Mucosa two times a day  chlorhexidine 4% Liquid 1 Application(s) Topical <User Schedule>  enoxaparin Injectable 65 milliGRAM(s) SubCutaneous every 12 hours  methylPREDNISolone sodium succinate Injectable 60 milliGRAM(s) IV Push two times a day  midazolam Infusion 0.08 mG/kG/Hr IV Continuous <Continuous>  morphine  Infusion 2 mG/Hr IV Continuous <Continuous>  norepinephrine Infusion 0.05 MICROgram(s)/kG/Min IV Continuous <Continuous>  pantoprazole   Suspension 40 milliGRAM(s) Oral daily  polyethylene glycol 3350 17 Gram(s) Oral daily  senna 2 Tablet(s) Oral at bedtime  thiamine IVPB 200 milliGRAM(s) IV Intermittent daily  vasopressin Infusion 0.04 Unit(s)/Min IV Continuous <Continuous>    PRN MEDICATIONS  acetaminophen   Tablet .. 650 milliGRAM(s) Oral every 6 hours PRN      VITAL SIGNS: Last 24 Hours  T(C): 36.3 (30 Mar 2020 04:00), Max: 36.9 (29 Mar 2020 17:00)  T(F): 97.3 (30 Mar 2020 04:00), Max: 98.4 (29 Mar 2020 17:00)  HR: 70 (30 Mar 2020 09:45) (70 - 100)  BP: 129/61 (30 Mar 2020 09:45) (95/62 - 155/72)  BP(mean): 84 (30 Mar 2020 09:45) (61 - 100)  RR: 24 (30 Mar 2020 09:45) (11 - 30)  SpO2: 100% (30 Mar 2020 09:45) (91% - 100%)    LABS:                        11.9   9.55  )-----------( 330      ( 30 Mar 2020 05:26 )             38.2     03-30    144  |  99  |  26<H>  ----------------------------<  154<H>  5.1<H>   |  31  |  0.6<L>    Ca    7.9<L>      30 Mar 2020 05:26  Mg     2.0     03-28    TPro  5.1<L>  /  Alb  2.8<L>  /  TBili  0.2  /  DBili  x   /  AST  94<H>  /  ALT  168<H>  /  AlkPhos  148<H>  03-30    ABG - ( 30 Mar 2020 02:09 )  pH, Arterial: 7.34  pH, Blood: x     /  pCO2: 67    /  pO2: 198   / HCO3: 36    / Base Excess: 8.0   /  SaO2: 99          Sedimentation Rate, Erythrocyte: 4 mm/Hr (03-30-20 @ 05:26)  Troponin T, Serum: <0.01 ng/mL (03-30-20 @ 05:26)    Culture - Blood (collected 28 Mar 2020 11:49)  Source: .Blood None  Gram Stain (30 Mar 2020 04:33):    Growth in aerobic bottle: Gram Positive Cocci in Clusters  Preliminary Report (30 Mar 2020 04:33):    Growth in aerobic bottle: Gram Positive Cocci in Clusters    ***Blood Panel PCR results on this specimen are available    approximately 3 hours after the Gram stain result.***    Gram stain, PCR, and/or culture results may not always    correspond due to difference in methodologies.    ************************************************************    This PCR assay was performed using [employer].    The following targets are tested for: Enterococcus,    vancomycin resistant enterococci, Listeria monocytogenes,    coagulase negative staphylococci, S. aureus,    methicillin resistant S. aureus, Streptococcus agalactiae    (Group B), S. pneumoniae, S. pyogenes (Group A),    Acinetobacter baumannii, Enterobacter cloacae, E. coli,    Klebsiella oxytoca, K. pneumoniae, Proteus sp.,    Serratia marcescens, Haemophilus influenzae,    Neisseria meningitidis, Pseudomonas aeruginosa, Candida    albicans, C. glabrata, C krusei, C parapsilosis,    C. tropicalis and the KPC resistance gene.  Organism: Blood Culture PCR (30 Mar 2020 05:48)  Organism: Blood Culture PCR (30 Mar 2020 05:48)    Culture - Urine (collected 28 Mar 2020 08:19)  Source: .Urine Catheterized  Final Report (29 Mar 2020 16:11):    No growth      CARDIAC MARKERS ( 30 Mar 2020 05:26 )  x     / <0.01 ng/mL / x     / x     / x      CARDIAC MARKERS ( 28 Mar 2020 20:50 )  x     / x     / 261 U/L / x     / x          RADIOLOGY:    PHYSICAL EXAM:    GENERAL: intubated/sedated, does not withdraw from pain  HEENT:  Atraumatic, Normocephalic. EOMI, PERRLA, conjunctiva and sclera clear, No JVD  PULMONARY: Clear to auscultation bilaterally; No wheeze  CARDIOVASCULAR: Regular rate and rhythm; No murmurs, rubs, or gallops  GASTROINTESTINAL: Soft, Nontender, Nondistended; Bowel sounds present  MUSCULOSKELETAL:  2+ Peripheral Pulses, No clubbing, cyanosis, or edema  NEUROLOGY: non-focal  SKIN: No rash      ASSESSMENT & PLAN    69 y/o female with pmh of a-fib, c/o fever, non-productive cough, body aches and decreased appetite x 7 days. No known sick contacts. No diarrhea. No ABD pain.   Patient was found to be covid +  She required intubated on 3/24/2020 for hypoxic respiratory failure.     # hypoxic respiratory failure due to COVID -19 +, requiring intubation  CXR: bilateral reticular infiltrates - improving on the right side  Still requiring same o2 on ventilator  On levophed and vasopressin  Completed course of UNSYN for possible aspiration PNA  Completed course of Plaquenil   - s/p 1 dose of tocilizumab  - solumedrol day 2  - on low dose Levophed, tapering down given addition of vasopressin    # Chronic A.fib with high rate A.fib  - resolved  - amiodarone   - Lovenox therapeutic     Diet: tube feeds  Lines: left IJ  Garcia present  DVT prophylaxis: Lovenox theraputic  Ambulation: bedrest

## 2020-03-30 NOTE — PROGRESS NOTE ADULT - SUBJECTIVE AND OBJECTIVE BOX
CLIFTON LYNN  70y, Female    All available historical data reviewed    OVERNIGHT EVENTS:    none  ROS:  FiO2 100%  on Levo    VITALS:  T(F): 96.8, Max: 98.4 (03-29-20 @ 17:00)  HR: 72  BP: 122/60  RR: 25Vital Signs Last 24 Hrs  T(C): 36 (30 Mar 2020 12:00), Max: 36.9 (29 Mar 2020 17:00)  T(F): 96.8 (30 Mar 2020 12:00), Max: 98.4 (29 Mar 2020 17:00)  HR: 72 (30 Mar 2020 12:30) (68 - 100)  BP: 122/60 (30 Mar 2020 12:30) (95/62 - 155/72)  BP(mean): 78 (30 Mar 2020 12:30) (61 - 100)  RR: 25 (30 Mar 2020 12:30) (11 - 30)  SpO2: 99% (30 Mar 2020 12:30) (91% - 100%)    TESTS & MEASUREMENTS:                        11.9   9.55  )-----------( 330      ( 30 Mar 2020 05:26 )             38.2     03-30    144  |  99  |  26<H>  ----------------------------<  154<H>  5.1<H>   |  31  |  0.6<L>    Ca    7.9<L>      30 Mar 2020 05:26  Mg     2.0     03-28    TPro  5.1<L>  /  Alb  2.8<L>  /  TBili  0.2  /  DBili  x   /  AST  94<H>  /  ALT  168<H>  /  AlkPhos  148<H>  03-30    LIVER FUNCTIONS - ( 30 Mar 2020 05:26 )  Alb: 2.8 g/dL / Pro: 5.1 g/dL / ALK PHOS: 148 U/L / ALT: 168 U/L / AST: 94 U/L / GGT: x             Culture - Blood (collected 03-28-20 @ 11:49)  Source: .Blood None  Gram Stain (03-30-20 @ 04:33):    Growth in aerobic bottle: Gram Positive Cocci in Clusters  Preliminary Report (03-30-20 @ 04:33):    Growth in aerobic bottle: Gram Positive Cocci in Clusters    ***Blood Panel PCR results on this specimen are available    approximately 3 hours after the Gram stain result.***    Gram stain, PCR, and/or culture results may not always    correspond due to difference in methodologies.    ************************************************************    This PCR assay was performed using [employer].    The following targets are tested for: Enterococcus,    vancomycin resistant enterococci, Listeria monocytogenes,    coagulase negative staphylococci, S. aureus,    methicillin resistant S. aureus, Streptococcus agalactiae    (Group B), S. pneumoniae, S. pyogenes (Group A),    Acinetobacter baumannii, Enterobacter cloacae, E. coli,    Klebsiella oxytoca, K. pneumoniae, Proteus sp.,    Serratia marcescens, Haemophilus influenzae,    Neisseria meningitidis, Pseudomonas aeruginosa, Candida    albicans, C. glabrata, C krusei, C parapsilosis,    C. tropicalis and the KPC resistance gene.  Organism: Blood Culture PCR (03-30-20 @ 05:48)  Organism: Blood Culture PCR (03-30-20 @ 05:48)      -  Coagulase negative Staphylococcus: Detec      Method Type: PCR    Culture - Urine (collected 03-28-20 @ 08:19)  Source: .Urine Catheterized  Final Report (03-29-20 @ 16:11):    No growth            RADIOLOGY & ADDITIONAL TESTS:  Personal review of radiological diagnostics performed  Echo and EKG results noted when applicable.     MEDICATIONS:  acetaminophen   Tablet .. 650 milliGRAM(s) Oral every 6 hours PRN  aMIOdarone    Tablet 200 milliGRAM(s) Oral two times a day  ascorbic acid 500 milliGRAM(s) Oral three times a day  chlorhexidine 0.12% Liquid 15 milliLiter(s) Oral Mucosa two times a day  chlorhexidine 4% Liquid 1 Application(s) Topical <User Schedule>  enoxaparin Injectable 65 milliGRAM(s) SubCutaneous every 12 hours  methylPREDNISolone sodium succinate Injectable 60 milliGRAM(s) IV Push two times a day  midazolam Infusion 0.08 mG/kG/Hr IV Continuous <Continuous>  morphine  Infusion 2 mG/Hr IV Continuous <Continuous>  norepinephrine Infusion 0.05 MICROgram(s)/kG/Min IV Continuous <Continuous>  pantoprazole   Suspension 40 milliGRAM(s) Oral daily  polyethylene glycol 3350 17 Gram(s) Oral daily  senna 2 Tablet(s) Oral at bedtime  thiamine IVPB 200 milliGRAM(s) IV Intermittent daily  vasopressin Infusion 0.04 Unit(s)/Min IV Continuous <Continuous>      ANTIBIOTICS:

## 2020-03-30 NOTE — PROGRESS NOTE ADULT - SUBJECTIVE AND OBJECTIVE BOX
Patient is a 70y old  Female who presents with a chief complaint of covid r/o given fevers, non productive cough (29 Mar 2020 10:40)        Over Night Events:  Remains critically ill on MV.  On Pressors.  Sedated         ROS:     CONSTITUTIONAL:   no fever   no chills.      EYES:   no discharge,   no redness,       ENT:   Nose: Nno nasal drainage.  Mouth/Throat: no dysphagia,  no hoarseness and no throat pain.     CARDIOVASCULAR:   no chest pain,   no swelling  no palpitations  no syncope    RESPIRATORY:  Per HPI     GASTROINTESTINAL:   no diarrhea,   no vomiting.    GENITOURINARY:  no dysuria,   no hematuria.    MUSCULOSKELETAL:   no back pain,   no musculoskeletal pain,    SKIN:   no jaundice,   no lesions,   no rashes.    NEURO:   Sedated     PSYCHIATRIC:   no known mental health issues  Sedated     ALLERGIC/IMMUNOLOGIC:   No active allergic or immunologic issues        PHYSICAL EXAM    ICU Vital Signs Last 24 Hrs  T(C): 36.3 (30 Mar 2020 04:00), Max: 36.9 (29 Mar 2020 17:00)  T(F): 97.3 (30 Mar 2020 04:00), Max: 98.4 (29 Mar 2020 17:00)  HR: 70 (30 Mar 2020 06:45) (70 - 100)  BP: 119/63 (30 Mar 2020 06:45) (95/62 - 155/72)  BP(mean): 84 (30 Mar 2020 06:45) (67 - 100)  ABP: --  ABP(mean): --  RR: 23 (30 Mar 2020 06:45) (11 - 30)  SpO2: 100% (30 Mar 2020 06:45) (91% - 100%)      CONSTITUTIONAL:   Well nourished.  NAD    ENT:   Airway patent,   Mouth with normal mucosa.   No thrush    EYES:   Pupils equal,   Round and reactive to light.    CARDIAC:   Normal rate,   Regular rhythm.    No edema      Vascular:  Normal systolic impulse  No Carotid bruits    RESPIRATORY:   No wheezing  Bilateral BS  Normal chest expansion  Not tachypneic,  No use of accessory muscles    GASTROINTESTINAL:  Abdomen soft,   Non-tender,   No guarding,   + BS    GENITOURINARY  normal genitalia for sex  no edema    MUSCULOSKELETAL:   Range of motion is not limited,  No clubbing, cyanosis    NEUROLOGICAL:   Sedated   Withdraws to pain     SKIN:   Skin normal color for race,   Warm and dry and intact.   No evidence of rash.    PSYCHIATRIC:   Normal mood and affect.   No apparent risk to self or others.    HEMATOLOGICAL:  No cervical  lymphadenopathy.  no inguinal lymphadenopathy      20 @ 07:01  -  20 @ 07:00  --------------------------------------------------------  IN:    amiodarone Infusion: 99.6 mL    dextrose 5%.: 90 mL    midazolam Infusion: 64.6 mL    morphine  Infusion: 89 mL    norepinephrine Infusion: 165.6 mL    Osmolite: 600 mL    vasopressin Infusion: 57.6 mL  Total IN: 1166.4 mL    OUT:    Indwelling Catheter - Urethral: 555 mL  Total OUT: 555 mL    Total NET: 611.4 mL          LABS:                            11.9   9.55  )-----------( 330      ( 30 Mar 2020 05:26 )             38.2                                                   144  |  99  |  26<H>  ----------------------------<  154<H>  5.1<H>   |  31  |  0.6<L>    Ca    7.9<L>      30 Mar 2020 05:26  Mg     2.0         TPro  5.1<L>  /  Alb  2.8<L>  /  TBili  0.2  /  DBili  x   /  AST  94<H>  /  ALT  168<H>  /  AlkPhos  148<H>                                               Urinalysis Basic - ( 28 Mar 2020 08:19 )    Color: Yellow / Appearance: Clear / S.039 / pH: x  Gluc: x / Ketone: Negative  / Bili: Negative / Urobili: 6 mg/dL   Blood: x / Protein: 100 mg/dL / Nitrite: Negative   Leuk Esterase: Negative / RBC: 2 /HPF / WBC 5 /HPF   Sq Epi: x / Non Sq Epi: 6 /HPF / Bacteria: Negative        CARDIAC MARKERS ( 30 Mar 2020 05:26 )  x     / <0.01 ng/mL / x     / x     / x      CARDIAC MARKERS ( 28 Mar 2020 20:50 )  x     / x     / 261 U/L / x     / x                                                LIVER FUNCTIONS - ( 30 Mar 2020 05:26 )  Alb: 2.8 g/dL / Pro: 5.1 g/dL / ALK PHOS: 148 U/L / ALT: 168 U/L / AST: 94 U/L / GGT: x                                                  Culture - Blood (collected 28 Mar 2020 11:49)  Source: .Blood None  Gram Stain (30 Mar 2020 04:33):    Growth in aerobic bottle: Gram Positive Cocci in Clusters  Preliminary Report (30 Mar 2020 04:33):    Growth in aerobic bottle: Gram Positive Cocci in Clusters    ***Blood Panel PCR results on this specimen are available    approximately 3 hours after the Gram stain result.***    Gram stain, PCR, and/or culture results may not always    correspond due to difference in methodologies.    ************************************************************    This PCR assay was performed using [employer].    The following targets are tested for: Enterococcus,    vancomycin resistant enterococci, Listeria monocytogenes,    coagulase negative staphylococci, S. aureus,    methicillin resistant S. aureus, Streptococcus agalactiae    (Group B), S. pneumoniae, S. pyogenes (Group A),    Acinetobacter baumannii, Enterobacter cloacae, E. coli,    Klebsiella oxytoca, K. pneumoniae, Proteus sp.,    Serratia marcescens, Haemophilus influenzae,    Neisseria meningitidis, Pseudomonas aeruginosa, Candida    albicans, C. glabrata, C krusei, C parapsilosis,    C. tropicalis and the KPC resistance gene.  Organism: Blood Culture PCR (30 Mar 2020 05:48)  Organism: Blood Culture PCR (30 Mar 2020 05:48)    Culture - Urine (collected 28 Mar 2020 08:19)  Source: .Urine Catheterized  Final Report (29 Mar 2020 16:11):    No growth                                                   Mode: AC/ CMV (Assist Control/ Continuous Mandatory Ventilation)  RR (machine): 24  TV (machine): 400  FiO2: 100  PEEP: 17.5  ITime:   MAP: 27  PIP: 42                                      ABG - ( 30 Mar 2020 02:09 )  pH, Arterial: 7.34  pH, Blood: x     /  pCO2: 67    /  pO2: 198   / HCO3: 36    / Base Excess: 8.0   /  SaO2: 99    PPL 28              MEDICATIONS  (STANDING):  aMIOdarone    Tablet 200 milliGRAM(s) Oral two times a day  ascorbic acid 500 milliGRAM(s) Oral three times a day  chlorhexidine 0.12% Liquid 15 milliLiter(s) Oral Mucosa two times a day  chlorhexidine 4% Liquid 1 Application(s) Topical <User Schedule>  enoxaparin Injectable 65 milliGRAM(s) SubCutaneous every 12 hours  methylPREDNISolone sodium succinate Injectable 60 milliGRAM(s) IV Push two times a day  midazolam Infusion 0.08 mG/kG/Hr (5.26 mL/Hr) IV Continuous <Continuous>  morphine  Infusion 2 mG/Hr (2 mL/Hr) IV Continuous <Continuous>  norepinephrine Infusion 0.05 MICROgram(s)/kG/Min (6.14 mL/Hr) IV Continuous <Continuous>  pantoprazole   Suspension 40 milliGRAM(s) Oral daily  polyethylene glycol 3350 17 Gram(s) Oral daily  senna 2 Tablet(s) Oral at bedtime  thiamine IVPB 200 milliGRAM(s) IV Intermittent daily  vasopressin Infusion 0.04 Unit(s)/Min (2.4 mL/Hr) IV Continuous <Continuous>    MEDICATIONS  (PRN):  acetaminophen   Tablet .. 650 milliGRAM(s) Oral every 6 hours PRN Temp greater or equal to 38C (100.4F)      New X-rays reviewed:                                                                                  ECHO    CXR interpreted by me:

## 2020-03-30 NOTE — PROGRESS NOTE ADULT - ASSESSMENT
· Assessment		  71 y/o female with pmh of a-fib on Eliquis , c/o fever, non-productive cough, body aches and decreased appetite x 7 days. No known sick contacts. No diarrhea. No ABD pain. No H/A, no neck pain. No dysuria/frequency/urgency. Presented with sob  No hemoptysis.  Patient admitted to internal medicine service for acute hypoxemic respiratory failure 2/2 covid19 .Patient intubated in CEU     IMPRESSION:  #resolving Severe septic shock ( ( still with lactate acidemia 3.3  ) secondary to COVID19 with critical disease  -ARDS with FiO2 100 %  -s/p Toci 3/24  -presently off al ABx  -CXR progressively stable with bibasilar opacities  -procal 0.06 > 0.11 which goes against a bacterial process  -no ARF  -ALT mild elevation  -BC 3/22 NG  -BCx 3/28 CoNS ( not a true pathogen )  -inflammatory markers increasing. Ferritin 1047    RECOMMENDATIONS:  -track Ferritin ( upward trend )  -HIV test  -prognosis is extremely poor given the cytokine storm

## 2020-03-30 NOTE — CHART NOTE - NSCHARTNOTEFT_GEN_A_CORE
Registered Dietitian Follow-Up    ***Scroll to the bottom for RD recommendation***    Patient Profile Reviewed                           Yes [x]   No []  Nutrition History Previously Obtained        Yes []  No [x]          PERTINENT SUBJECTIVE INFORMATION (LATEST AS OF TODAY):  - pt remains intubated to ventilator, / 63, MAP 87, Temp 36.3c, Ve 9.29,   - on IV pressor x2, versed  - unable to locate RN to evaluate TF toleration  - previous RD recs not taken        PERTINENT MEDICAL INFORMATIONS:  (1) p/w fever and cough and COVID19+- Remains ill on MV, on pressor, sedated.   (2) acute hypoxemic resp failure 2/2 COVID19. ARDS. Sepsis septic shock.  (3) C/w sedation. wean levphed, avoid volume overload.   (4) Electrolyte correct PRN.          DIET ORDER:  OSMOLITE 1.5 at 300cc q6hr (OGT) - 1775 kcal/ 75g protein         ANTHROPOMETRICS:  - Ht.  160cm  - Wt.  (3/19): 67.3kg  (3/24): 65.5kg - weight trending downward, but will continue to monitor trend for consistency  - BMI. 25.6  - IBW.        PERTINENT LAB DATA:  3/30: hemoglobin 11.9, K 5.1, BUN 26, Cr 0.6, glucose 154, ca 7.9, Albumin 2.8, LFT elevated  PERTINENT MEDS:   enoxaparin, abx, acetaminophen, amiodarone, vitamin C, methylprednisolone, versed, miralax, senna, b1, pressor, levphed      PHYSICAL FINDINGS  - APPEARANCE:        intubated to ventilator, no edema noted  - GI FUNCTION:        LBM unknown, not documented on EMR  - TUBES:                     OGT  - ORAL/MOUTH:      NPO  - SKIN:                        Ecchymosis        NUTRITION REQUIREMENTS  WEIGHT USED:                          ABW is 67.3kg (from 3/19)  ESTIMATED ENERGY NEEDS:       CONTINUE [  ]      ADJUST [ x ]    ESTIMATED ENERGY NEEDS:         1227 kcal/day ([id number])  ESTIMATED PROTEIN NEEDS:        78-98 g/day (1.2-1.5 g/kg of ABW)  ESTIMATED FLUID NEEDS:             fluid per ICU team    CURRENT NUTRIENT NEEDS:     1775 kcal/ 75g protein  (remains exceeding kcal but meeting protein)          [x  ] PREVIOUS NUTRITION DIAGNOSIS:   (1) inadequate protein-energy intake            [ x ] ONGOING        [  ] RESOLVED            PATIENT INTERVENTION:    [  ] ORAL        [x] EN/TF     GOAL/EXPECTED OUTCOME:     pt to meet % of estimated kcal/protein via TF upon f/u in 3 days. Pt to have 1BM/day.   INDICATOR/MONITORING:       RD to monitor diet order, energy intake, body composition, nutrition focused physical findings (EN tolerance, electrolytes, BM)  NUTRITION INTERVENTION:        Enteral nutrition       RECS: (1) Same recs as previously, please change formula rate to Osmolite 1.5 at 35cc/hr, with No-carb prosource TF packet x3/day. This gives a total of 1362 kcal 85g protein/ 640mL free water, additional flushes per ICU team. Continuous feeding is preferred at this time due to pt being on 2x pressors. If weaned, may do Bolus at 210ml q6hr with prosource packet x3/day. (2) keep MAP>65.

## 2020-03-30 NOTE — CHART NOTE - NSCHARTNOTEFT_GEN_A_CORE
Hello,    This is your Dietitian Joshua with instruction on how to activate this recommendation which has been sent to you:    RECS: (1) Same recs as previously, please change formula rate to Osmolite 1.5 at 35cc/hr, with No-carb prosource TF packet x3/day. This gives a total of 1362 kcal 85g protein/ 640mL free water, additional flushes per ICU team. Continuous feeding is preferred at this time due to pt being on 2x pressors. It is better controlled with continuous feeding. If weaned, may do Bolus at 210ml q6hr with prosource packet x3/day. (2) keep MAP>65.      Step 1: Before activating this new diet, please de-activate/discontinue the existing active diet first (under nutrition, in "order" tab).  Step 2: Once the existing active diet has been discontinued, then sign/activate the new diet  Step 3: Now, you should see the new TF being active!    Tip: if you have signed the new diet order before discontinuing the old one, you will need to discontinue the old one under nutrition in the order Tab, and then find and activate the one that you have just signed but in "pending attending verfication" to make it active.       Thank you!

## 2020-03-31 LAB
ALBUMIN SERPL ELPH-MCNC: 2.7 G/DL — LOW (ref 3.5–5.2)
ALP SERPL-CCNC: 141 U/L — HIGH (ref 30–115)
ALT FLD-CCNC: 134 U/L — HIGH (ref 0–41)
ANION GAP SERPL CALC-SCNC: 11 MMOL/L — SIGNIFICANT CHANGE UP (ref 7–14)
AST SERPL-CCNC: 72 U/L — HIGH (ref 0–41)
BASOPHILS # BLD AUTO: 0.01 K/UL — SIGNIFICANT CHANGE UP (ref 0–0.2)
BASOPHILS NFR BLD AUTO: 0.1 % — SIGNIFICANT CHANGE UP (ref 0–1)
BILIRUB SERPL-MCNC: 0.2 MG/DL — SIGNIFICANT CHANGE UP (ref 0.2–1.2)
BUN SERPL-MCNC: 30 MG/DL — HIGH (ref 10–20)
CALCIUM SERPL-MCNC: 8.6 MG/DL — SIGNIFICANT CHANGE UP (ref 8.5–10.1)
CHLORIDE SERPL-SCNC: 100 MMOL/L — SIGNIFICANT CHANGE UP (ref 98–110)
CO2 SERPL-SCNC: 33 MMOL/L — HIGH (ref 17–32)
CREAT SERPL-MCNC: <0.5 MG/DL — LOW (ref 0.7–1.5)
CULTURE RESULTS: SIGNIFICANT CHANGE UP
EOSINOPHIL # BLD AUTO: 0 K/UL — SIGNIFICANT CHANGE UP (ref 0–0.7)
EOSINOPHIL NFR BLD AUTO: 0 % — SIGNIFICANT CHANGE UP (ref 0–8)
GLUCOSE BLDC GLUCOMTR-MCNC: 198 MG/DL — HIGH (ref 70–99)
GLUCOSE SERPL-MCNC: 213 MG/DL — HIGH (ref 70–99)
HCT VFR BLD CALC: 34.4 % — LOW (ref 37–47)
HGB BLD-MCNC: 10.5 G/DL — LOW (ref 12–16)
HIV 1+2 AB+HIV1 P24 AG SERPL QL IA: SIGNIFICANT CHANGE UP
IMM GRANULOCYTES NFR BLD AUTO: 1.9 % — HIGH (ref 0.1–0.3)
LACTATE SERPL-SCNC: 5.4 MMOL/L — CRITICAL HIGH (ref 0.7–2)
LYMPHOCYTES # BLD AUTO: 0.32 K/UL — LOW (ref 1.2–3.4)
LYMPHOCYTES # BLD AUTO: 4.4 % — LOW (ref 20.5–51.1)
MAGNESIUM SERPL-MCNC: 2.8 MG/DL — HIGH (ref 1.8–2.4)
MCHC RBC-ENTMCNC: 27.9 PG — SIGNIFICANT CHANGE UP (ref 27–31)
MCHC RBC-ENTMCNC: 30.5 G/DL — LOW (ref 32–37)
MCV RBC AUTO: 91.2 FL — SIGNIFICANT CHANGE UP (ref 81–99)
MONOCYTES # BLD AUTO: 0.24 K/UL — SIGNIFICANT CHANGE UP (ref 0.1–0.6)
MONOCYTES NFR BLD AUTO: 3.3 % — SIGNIFICANT CHANGE UP (ref 1.7–9.3)
NEUTROPHILS # BLD AUTO: 6.64 K/UL — HIGH (ref 1.4–6.5)
NEUTROPHILS NFR BLD AUTO: 90.3 % — HIGH (ref 42.2–75.2)
NRBC # BLD: 0 /100 WBCS — SIGNIFICANT CHANGE UP (ref 0–0)
ORGANISM # SPEC MICROSCOPIC CNT: SIGNIFICANT CHANGE UP
ORGANISM # SPEC MICROSCOPIC CNT: SIGNIFICANT CHANGE UP
PLATELET # BLD AUTO: 287 K/UL — SIGNIFICANT CHANGE UP (ref 130–400)
POTASSIUM SERPL-MCNC: 5.1 MMOL/L — HIGH (ref 3.5–5)
POTASSIUM SERPL-SCNC: 5.1 MMOL/L — HIGH (ref 3.5–5)
PROT SERPL-MCNC: 5 G/DL — LOW (ref 6–8)
RBC # BLD: 3.77 M/UL — LOW (ref 4.2–5.4)
RBC # FLD: 14.4 % — SIGNIFICANT CHANGE UP (ref 11.5–14.5)
SODIUM SERPL-SCNC: 144 MMOL/L — SIGNIFICANT CHANGE UP (ref 135–146)
SPECIMEN SOURCE: SIGNIFICANT CHANGE UP
WBC # BLD: 7.35 K/UL — SIGNIFICANT CHANGE UP (ref 4.8–10.8)
WBC # FLD AUTO: 7.35 K/UL — SIGNIFICANT CHANGE UP (ref 4.8–10.8)

## 2020-03-31 PROCEDURE — 93010 ELECTROCARDIOGRAM REPORT: CPT

## 2020-03-31 PROCEDURE — 71045 X-RAY EXAM CHEST 1 VIEW: CPT | Mod: 26

## 2020-03-31 RX ORDER — PROPOFOL 10 MG/ML
10 INJECTION, EMULSION INTRAVENOUS
Qty: 1000 | Refills: 0 | Status: DISCONTINUED | OUTPATIENT
Start: 2020-03-31 | End: 2020-04-05

## 2020-03-31 RX ORDER — PROPOFOL 10 MG/ML
10 INJECTION, EMULSION INTRAVENOUS
Qty: 500 | Refills: 0 | Status: DISCONTINUED | OUTPATIENT
Start: 2020-03-31 | End: 2020-03-31

## 2020-03-31 RX ADMIN — MORPHINE SULFATE 2 MG/HR: 50 CAPSULE, EXTENDED RELEASE ORAL at 20:34

## 2020-03-31 RX ADMIN — SENNA PLUS 2 TABLET(S): 8.6 TABLET ORAL at 00:09

## 2020-03-31 RX ADMIN — AMIODARONE HYDROCHLORIDE 200 MILLIGRAM(S): 400 TABLET ORAL at 05:31

## 2020-03-31 RX ADMIN — PANTOPRAZOLE SODIUM 40 MILLIGRAM(S): 20 TABLET, DELAYED RELEASE ORAL at 12:34

## 2020-03-31 RX ADMIN — ENOXAPARIN SODIUM 65 MILLIGRAM(S): 100 INJECTION SUBCUTANEOUS at 16:30

## 2020-03-31 RX ADMIN — Medication 500 MILLIGRAM(S): at 12:35

## 2020-03-31 RX ADMIN — POLYETHYLENE GLYCOL 3350 17 GRAM(S): 17 POWDER, FOR SOLUTION ORAL at 12:34

## 2020-03-31 RX ADMIN — Medication 500 MILLIGRAM(S): at 21:08

## 2020-03-31 RX ADMIN — PROPOFOL 3.93 MICROGRAM(S)/KG/MIN: 10 INJECTION, EMULSION INTRAVENOUS at 20:35

## 2020-03-31 RX ADMIN — CHLORHEXIDINE GLUCONATE 15 MILLILITER(S): 213 SOLUTION TOPICAL at 05:33

## 2020-03-31 RX ADMIN — Medication 60 MILLIGRAM(S): at 16:29

## 2020-03-31 RX ADMIN — Medication 60 MILLIGRAM(S): at 05:31

## 2020-03-31 RX ADMIN — CHLORHEXIDINE GLUCONATE 15 MILLILITER(S): 213 SOLUTION TOPICAL at 16:30

## 2020-03-31 RX ADMIN — AMIODARONE HYDROCHLORIDE 200 MILLIGRAM(S): 400 TABLET ORAL at 16:29

## 2020-03-31 RX ADMIN — Medication 102 MILLIGRAM(S): at 12:33

## 2020-03-31 RX ADMIN — SENNA PLUS 2 TABLET(S): 8.6 TABLET ORAL at 21:09

## 2020-03-31 RX ADMIN — ENOXAPARIN SODIUM 65 MILLIGRAM(S): 100 INJECTION SUBCUTANEOUS at 05:34

## 2020-03-31 NOTE — PROGRESS NOTE ADULT - SUBJECTIVE AND OBJECTIVE BOX
CLIFTON LYNN 70y Female  MRN#: [id number]   CODE STATUS:__full______      SUBJECTIVE  Patient is a 70y old Female who presents with a chief complaint of covid r/o given fevers, non productive cough (30 Mar 2020 13:05)  Currently admitted to medicine with the primary diagnosis of Sepsis      Today is hospital day 9d, and this morning she is intubated and sedated.   No overnight events    OBJECTIVE  PAST MEDICAL & SURGICAL HISTORY  Afib    ALLERGIES:  Originally Entered as [Unknown] reaction to [green pepper] (Unknown)  Originally Entered as [Unknown] reaction to [pepper] (Unknown)  sulfa drugs (Unknown)    MEDICATIONS:  STANDING MEDICATIONS  aMIOdarone    Tablet 200 milliGRAM(s) Oral two times a day  ascorbic acid 500 milliGRAM(s) Oral three times a day  chlorhexidine 0.12% Liquid 15 milliLiter(s) Oral Mucosa two times a day  chlorhexidine 4% Liquid 1 Application(s) Topical <User Schedule>  enoxaparin Injectable 65 milliGRAM(s) SubCutaneous every 12 hours  methylPREDNISolone sodium succinate Injectable 60 milliGRAM(s) IV Push two times a day  midazolam Infusion 0.08 mG/kG/Hr IV Continuous <Continuous>  morphine  Infusion 2 mG/Hr IV Continuous <Continuous>  norepinephrine Infusion 0.05 MICROgram(s)/kG/Min IV Continuous <Continuous>  pantoprazole   Suspension 40 milliGRAM(s) Oral daily  polyethylene glycol 3350 17 Gram(s) Oral daily  senna 2 Tablet(s) Oral at bedtime  thiamine IVPB 200 milliGRAM(s) IV Intermittent daily  vasopressin Infusion 0.04 Unit(s)/Min IV Continuous <Continuous>    PRN MEDICATIONS  acetaminophen   Tablet .. 650 milliGRAM(s) Oral every 6 hours PRN      VITAL SIGNS: Last 24 Hours  T(C): 34.5 (30 Mar 2020 20:00), Max: 37.2 (30 Mar 2020 16:00)  T(F): 94.1 (30 Mar 2020 20:00), Max: 98.9 (30 Mar 2020 16:00)  HR: 76 (30 Mar 2020 23:00) (58 - 86)  BP: 121/53 (30 Mar 2020 23:00) (99/55 - 148/67)  BP(mean): 72 (30 Mar 2020 23:00) (61 - 98)  RR: 24 (30 Mar 2020 23:00) (19 - 29)  SpO2: 98% (30 Mar 2020 23:00) (95% - 100%)    LABS:                        10.5   7.35  )-----------( 287      ( 31 Mar 2020 04:45 )             34.4     03-31    144  |  100  |  30<H>  ----------------------------<  213<H>  5.1<H>   |  33<H>  |  <0.5<L>    Ca    8.6      31 Mar 2020 04:45  Mg     2.8     03-31    TPro  5.0<L>  /  Alb  2.7<L>  /  TBili  0.2  /  DBili  x   /  AST  72<H>  /  ALT  134<H>  /  AlkPhos  141<H>  03-31        ABG - ( 31 Mar 2020 03:35 )  pH, Arterial: 7.38  pH, Blood: x     /  pCO2: 62    /  pO2: 120   / HCO3: 36    / Base Excess: 9.3   /  SaO2: 99            Culture - Blood (collected 28 Mar 2020 11:49)  Source: .Blood None  Gram Stain (30 Mar 2020 04:33):    Growth in aerobic bottle: Gram Positive Cocci in Clusters  Preliminary Report (30 Mar 2020 04:33):    Growth in aerobic bottle: Gram Positive Cocci in Clusters    ***Blood Panel PCR results on this specimen are available    approximately 3 hours after the Gram stain result.***    Gram stain, PCR, and/or culture results may not always    correspond due to difference in methodologies.    ************************************************************    This PCR assay was performed using [employer].    The following targets are tested for: Enterococcus,    vancomycin resistant enterococci, Listeria monocytogenes,    coagulase negative staphylococci, S. aureus,    methicillin resistant S. aureus, Streptococcus agalactiae    (Group B), S. pneumoniae, S. pyogenes (Group A),    Acinetobacter baumannii, Enterobacter cloacae, E. coli,    Klebsiella oxytoca, K. pneumoniae, Proteus sp.,    Serratia marcescens, Haemophilus influenzae,    Neisseria meningitidis, Pseudomonas aeruginosa, Candida    albicans, C. glabrata, C krusei, C parapsilosis,    C. tropicalis and the KPC resistance gene.  Organism: Blood Culture PCR (30 Mar 2020 05:48)  Organism: Blood Culture PCR (30 Mar 2020 05:48)    Culture - Urine (collected 28 Mar 2020 08:19)  Source: .Urine Catheterized  Final Report (29 Mar 2020 16:11):    No growth      CARDIAC MARKERS ( 30 Mar 2020 05:26 )  x     / <0.01 ng/mL / x     / x     / x          RADIOLOGY:    PHYSICAL EXAM:  GENERAL: intubated/sedated, does not withdraw from pain  HEENT:  Atraumatic, Normocephalic. EOMI, PERRLA, conjunctiva and sclera clear, No JVD  PULMONARY: Clear to auscultation bilaterally; No wheeze  CARDIOVASCULAR: Regular rate and rhythm; No murmurs, rubs, or gallops  GASTROINTESTINAL: Soft, Nontender, Nondistended; Bowel sounds present  MUSCULOSKELETAL:  2+ Peripheral Pulses, No clubbing, cyanosis, mild BUE edema  NEUROLOGY: non-focal  SKIN: No rash    ASSESSMENT & PLAN    71 y/o female with pmh of a-fib, c/o fever, non-productive cough, body aches and decreased appetite x 7 days. No known sick contacts. No diarrhea. No ABD pain.   Patient was found to be covid +  She required intubated on 3/24/2020 for hypoxic respiratory failure.     # hypoxic respiratory failure due to COVID -19 +, requiring intubation  CXR: bilateral reticular infiltrates - improving on the right side  Still requiring same o2 on ventilator  On levophed and vasopressin  Completed course of UNSYN for possible aspiration PNA  Completed course of Plaquenil   - s/p 1 dose of tocilizumab  - solumedrol day 2  - on low dose Levophed, tapering down given addition of vasopressin    # Chronic A.fib with high rate A.fib  - resolved  - amiodarone   - Lovenox therapeutic     Diet: tube feeds  Lines: left IJ  Garcia present  DVT prophylaxis: Lovenox theraputic  Ambulation: bedrest CLIFTON LYNN 70y Female  MRN#: [id number]   CODE STATUS:__full______      SUBJECTIVE  Patient is a 70y old Female who presents with a chief complaint of covid r/o given fevers, non productive cough (30 Mar 2020 13:05)  Currently admitted to medicine with the primary diagnosis of Sepsis      Today is hospital day 9d, and this morning she is intubated and sedated.   No overnight events    OBJECTIVE  PAST MEDICAL & SURGICAL HISTORY  Afib    ALLERGIES:  Originally Entered as [Unknown] reaction to [green pepper] (Unknown)  Originally Entered as [Unknown] reaction to [pepper] (Unknown)  sulfa drugs (Unknown)    MEDICATIONS:  STANDING MEDICATIONS  aMIOdarone    Tablet 200 milliGRAM(s) Oral two times a day  ascorbic acid 500 milliGRAM(s) Oral three times a day  chlorhexidine 0.12% Liquid 15 milliLiter(s) Oral Mucosa two times a day  chlorhexidine 4% Liquid 1 Application(s) Topical <User Schedule>  enoxaparin Injectable 65 milliGRAM(s) SubCutaneous every 12 hours  methylPREDNISolone sodium succinate Injectable 60 milliGRAM(s) IV Push two times a day  midazolam Infusion 0.08 mG/kG/Hr IV Continuous <Continuous>  morphine  Infusion 2 mG/Hr IV Continuous <Continuous>  norepinephrine Infusion 0.05 MICROgram(s)/kG/Min IV Continuous <Continuous>  pantoprazole   Suspension 40 milliGRAM(s) Oral daily  polyethylene glycol 3350 17 Gram(s) Oral daily  senna 2 Tablet(s) Oral at bedtime  thiamine IVPB 200 milliGRAM(s) IV Intermittent daily  vasopressin Infusion 0.04 Unit(s)/Min IV Continuous <Continuous>    PRN MEDICATIONS  acetaminophen   Tablet .. 650 milliGRAM(s) Oral every 6 hours PRN      VITAL SIGNS: Last 24 Hours  T(C): 34.5 (30 Mar 2020 20:00), Max: 37.2 (30 Mar 2020 16:00)  T(F): 94.1 (30 Mar 2020 20:00), Max: 98.9 (30 Mar 2020 16:00)  HR: 76 (30 Mar 2020 23:00) (58 - 86)  BP: 121/53 (30 Mar 2020 23:00) (99/55 - 148/67)  BP(mean): 72 (30 Mar 2020 23:00) (61 - 98)  RR: 24 (30 Mar 2020 23:00) (19 - 29)  SpO2: 98% (30 Mar 2020 23:00) (95% - 100%)    LABS:                        10.5   7.35  )-----------( 287      ( 31 Mar 2020 04:45 )             34.4     03-31    144  |  100  |  30<H>  ----------------------------<  213<H>  5.1<H>   |  33<H>  |  <0.5<L>    Ca    8.6      31 Mar 2020 04:45  Mg     2.8     03-31    TPro  5.0<L>  /  Alb  2.7<L>  /  TBili  0.2  /  DBili  x   /  AST  72<H>  /  ALT  134<H>  /  AlkPhos  141<H>  03-31        ABG - ( 31 Mar 2020 03:35 )  pH, Arterial: 7.38  pH, Blood: x     /  pCO2: 62    /  pO2: 120   / HCO3: 36    / Base Excess: 9.3   /  SaO2: 99            Culture - Blood (collected 28 Mar 2020 11:49)  Source: .Blood None  Gram Stain (30 Mar 2020 04:33):    Growth in aerobic bottle: Gram Positive Cocci in Clusters  Preliminary Report (30 Mar 2020 04:33):    Growth in aerobic bottle: Gram Positive Cocci in Clusters    ***Blood Panel PCR results on this specimen are available    approximately 3 hours after the Gram stain result.***    Gram stain, PCR, and/or culture results may not always    correspond due to difference in methodologies.    ************************************************************    This PCR assay was performed using [employer].    The following targets are tested for: Enterococcus,    vancomycin resistant enterococci, Listeria monocytogenes,    coagulase negative staphylococci, S. aureus,    methicillin resistant S. aureus, Streptococcus agalactiae    (Group B), S. pneumoniae, S. pyogenes (Group A),    Acinetobacter baumannii, Enterobacter cloacae, E. coli,    Klebsiella oxytoca, K. pneumoniae, Proteus sp.,    Serratia marcescens, Haemophilus influenzae,    Neisseria meningitidis, Pseudomonas aeruginosa, Candida    albicans, C. glabrata, C krusei, C parapsilosis,    C. tropicalis and the KPC resistance gene.  Organism: Blood Culture PCR (30 Mar 2020 05:48)  Organism: Blood Culture PCR (30 Mar 2020 05:48)    Culture - Urine (collected 28 Mar 2020 08:19)  Source: .Urine Catheterized  Final Report (29 Mar 2020 16:11):    No growth      CARDIAC MARKERS ( 30 Mar 2020 05:26 )  x     / <0.01 ng/mL / x     / x     / x          RADIOLOGY:    PHYSICAL EXAM:  GENERAL: intubated/sedated, does not withdraw from pain  HEENT:  Atraumatic, Normocephalic. EOMI, PERRLA, conjunctiva and sclera clear, No JVD  PULMONARY: Clear to auscultation bilaterally; No wheeze  CARDIOVASCULAR: Regular rate and rhythm; No murmurs, rubs, or gallops  GASTROINTESTINAL: Soft, Nontender, Nondistended; Bowel sounds present  MUSCULOSKELETAL:  2+ Peripheral Pulses, No clubbing, cyanosis, mild BUE edema  NEUROLOGY: non-focal  SKIN: No rash    ASSESSMENT & PLAN    69 y/o female with pmh of a-fib, c/o fever, non-productive cough, body aches and decreased appetite x 7 days. No known sick contacts. No diarrhea. No ABD pain.   Patient was found to be covid +  She required intubated on 3/24/2020 for hypoxic respiratory failure.     # hypoxic respiratory failure due to COVID -19 +, requiring intubation  CXR: bilateral reticular infiltrates - improving on the right side  requiring lower o2 settings on vent   off pressors now  Completed course of UNSYN for possible aspiration PNA  Completed course of Plaquenil   - s/p 1 dose of tocilizumab  - will decrease PEEP from 15 to 12.5 and go down with sedation today   - solumedrol day   - lactate high, will repeat lactate in 6 hours    # Chronic A.fib with high rate A.fib  - resolved  - amiodarone   - Lovenox therapeutic   - EKG to monitor qt     Diet: tube feeds  Lines: left IJ  Garcia present  DVT prophylaxis: Lovenox theraputic  Ambulation: bedrest

## 2020-03-31 NOTE — PROGRESS NOTE ADULT - SUBJECTIVE AND OBJECTIVE BOX
Patient is a 70y old  Female who presents with a chief complaint of covid r/o given fevers, non productive cough (31 Mar 2020 05:42)        Over Night Events:  Remains critically ill on MV.  Off pressors.  Sedated         ROS:     CONSTITUTIONAL:   no fever   no chills.      EYES:   no discharge,   no redness,       ENT:   Nose: No nasal drainage.  Mouth/Throat: no dysphagia,  no hoarseness and no throat pain.     CARDIOVASCULAR:   Off pressors     RESPIRATORY:  Per HPI     GASTROINTESTINAL:   no diarrhea,   no vomiting.    GENITOURINARY:  no dysuria,   no hematuria.    MUSCULOSKELETAL:   no back pain,   no musculoskeletal pain,    SKIN:   no jaundice,   no lesions,   no rashes.    NEURO:   Sedated     PSYCHIATRIC:   no known mental health issues  Sedated     ALLERGIC/IMMUNOLOGIC:   No active allergic or immunologic issues        PHYSICAL EXAM    ICU Vital Signs Last 24 Hrs  T(C): 34.5 (30 Mar 2020 20:00), Max: 37.2 (30 Mar 2020 16:00)  T(F): 94.1 (30 Mar 2020 20:00), Max: 98.9 (30 Mar 2020 16:00)  HR: 68 (31 Mar 2020 06:00) (56 - 86)  BP: 119/56 (31 Mar 2020 06:00) (99/55 - 148/67)  BP(mean): 77 (31 Mar 2020 06:00) (61 - 98)  ABP: --  ABP(mean): --  RR: 23 (31 Mar 2020 06:00) (19 - 29)  SpO2: 96% (31 Mar 2020 06:00) (95% - 100%)      CONSTITUTIONAL:  Well nourished.  NAD    ENT:   Airway patent,   Mouth with normal mucosa.   No thrush    EYES:   Pupils equal,   Round and reactive to light.    CARDIAC:   Normal rate,   Regular rhythm.    No edema      Vascular:  Normal systolic impulse  No Carotid bruits    RESPIRATORY:   No wheezing  Bilateral BS  Normal chest expansion  Not tachypneic,  No use of accessory muscles    GASTROINTESTINAL:  Abdomen soft,   Non-tender,   No guarding,   + BS    GENITOURINARY  normal genitalia for sex  no edema    MUSCULOSKELETAL:   Range of motion is not limited,  No clubbing, cyanosis    NEUROLOGICAL:   Sedated     SKIN:   Skin normal color for race,   Warm and dry and intact.   No evidence of rash.    PSYCHIATRIC:   Sedated   No apparent risk to self or others.    HEMATOLOGICAL:  No cervical  lymphadenopathy.  no inguinal lymphadenopathy      03-30-20 @ 07:01  -  03-31-20 @ 07:00  --------------------------------------------------------  IN:    midazolam Infusion: 73.6 mL    morphine  Infusion: 94 mL    norepinephrine Infusion: 58.5 mL    Osmolite: 900 mL    vasopressin Infusion: 57.6 mL  Total IN: 1183.7 mL    OUT:    Indwelling Catheter - Urethral: 925 mL  Total OUT: 925 mL    Total NET: 258.7 mL          LABS:                            10.5   7.35  )-----------( 287      ( 31 Mar 2020 04:45 )             34.4                                               03-31    144  |  100  |  30<H>  ----------------------------<  213<H>  5.1<H>   |  33<H>  |  <0.5<L>    Ca    8.6      31 Mar 2020 04:45  Mg     2.8     03-31    TPro  5.0<L>  /  Alb  2.7<L>  /  TBili  0.2  /  DBili  x   /  AST  72<H>  /  ALT  134<H>  /  AlkPhos  141<H>  03-31                                                 CARDIAC MARKERS ( 30 Mar 2020 05:26 )  x     / <0.01 ng/mL / x     / x     / x                                                LIVER FUNCTIONS - ( 31 Mar 2020 04:45 )  Alb: 2.7 g/dL / Pro: 5.0 g/dL / ALK PHOS: 141 U/L / ALT: 134 U/L / AST: 72 U/L / GGT: x                                                  Culture - Blood (collected 28 Mar 2020 11:49)  Source: .Blood None  Gram Stain (30 Mar 2020 04:33):    Growth in aerobic bottle: Gram Positive Cocci in Clusters  Preliminary Report (30 Mar 2020 04:33):    Growth in aerobic bottle: Gram Positive Cocci in Clusters    ***Blood Panel PCR results on this specimen are available    approximately 3 hours after the Gram stain result.***    Gram stain, PCR, and/or culture results may not always    correspond due to difference in methodologies.    ************************************************************    This PCR assay was performed using [employer].    The following targets are tested for: Enterococcus,    vancomycin resistant enterococci, Listeria monocytogenes,    coagulase negative staphylococci, S. aureus,    methicillin resistant S. aureus, Streptococcus agalactiae    (Group B), S. pneumoniae, S. pyogenes (Group A),    Acinetobacter baumannii, Enterobacter cloacae, E. coli,    Klebsiella oxytoca, K. pneumoniae, Proteus sp.,    Serratia marcescens, Haemophilus influenzae,    Neisseria meningitidis, Pseudomonas aeruginosa, Candida    albicans, C. glabrata, C krusei, C parapsilosis,    C. tropicalis and the KPC resistance gene.  Organism: Blood Culture PCR (30 Mar 2020 05:48)  Organism: Blood Culture PCR (30 Mar 2020 05:48)    Culture - Urine (collected 28 Mar 2020 08:19)  Source: .Urine Catheterized  Final Report (29 Mar 2020 16:11):    No growth                                                   Mode: AC/ CMV (Assist Control/ Continuous Mandatory Ventilation)  RR (machine): 24  TV (machine): 400  FiO2: 60  PEEP: 17.5  ITime: 1  MAP: 26  PIP: 37                                      ABG - ( 31 Mar 2020 03:35 )  pH, Arterial: 7.38  pH, Blood: x     /  pCO2: 62    /  pO2: 120   / HCO3: 36    / Base Excess: 9.3   /  SaO2: 99    PPL 18               MEDICATIONS  (STANDING):  aMIOdarone    Tablet 200 milliGRAM(s) Oral two times a day  ascorbic acid 500 milliGRAM(s) Oral three times a day  chlorhexidine 0.12% Liquid 15 milliLiter(s) Oral Mucosa two times a day  chlorhexidine 4% Liquid 1 Application(s) Topical <User Schedule>  enoxaparin Injectable 65 milliGRAM(s) SubCutaneous every 12 hours  methylPREDNISolone sodium succinate Injectable 60 milliGRAM(s) IV Push two times a day  midazolam Infusion 0.08 mG/kG/Hr (5.26 mL/Hr) IV Continuous <Continuous>  morphine  Infusion 2 mG/Hr (2 mL/Hr) IV Continuous <Continuous>  pantoprazole   Suspension 40 milliGRAM(s) Oral daily  polyethylene glycol 3350 17 Gram(s) Oral daily  senna 2 Tablet(s) Oral at bedtime  thiamine IVPB 200 milliGRAM(s) IV Intermittent daily    MEDICATIONS  (PRN):  acetaminophen   Tablet .. 650 milliGRAM(s) Oral every 6 hours PRN Temp greater or equal to 38C (100.4F)      New X-rays reviewed:                                                                                  ECHO    CXR interpreted by me:

## 2020-03-31 NOTE — PROGRESS NOTE ADULT - SUBJECTIVE AND OBJECTIVE BOX
CLIFTON LYNN  70y, Female    All available historical data reviewed    OVERNIGHT EVENTS:  none    ROS:  unable to obtain history secondary to patient's mental status and/or sedation     VITALS:  T(F): 96.5, Max: 98.9 (03-30-20 @ 16:00)  HR: 84  BP: 105/62  RR: 23Vital Signs Last 24 Hrs  T(C): 35.8 (31 Mar 2020 08:00), Max: 37.2 (30 Mar 2020 16:00)  T(F): 96.5 (31 Mar 2020 08:00), Max: 98.9 (30 Mar 2020 16:00)  HR: 84 (31 Mar 2020 08:00) (56 - 86)  BP: 105/62 (31 Mar 2020 08:00) (105/62 - 148/67)  BP(mean): 76 (31 Mar 2020 08:00) (69 - 98)  RR: 23 (31 Mar 2020 08:00) (19 - 25)  SpO2: 95% (31 Mar 2020 08:00) (95% - 99%)    TESTS & MEASUREMENTS:                        10.5   7.35  )-----------( 287      ( 31 Mar 2020 04:45 )             34.4     03-31    144  |  100  |  30<H>  ----------------------------<  213<H>  5.1<H>   |  33<H>  |  <0.5<L>    Ca    8.6      31 Mar 2020 04:45  Mg     2.8     03-31    TPro  5.0<L>  /  Alb  2.7<L>  /  TBili  0.2  /  DBili  x   /  AST  72<H>  /  ALT  134<H>  /  AlkPhos  141<H>  03-31    LIVER FUNCTIONS - ( 31 Mar 2020 04:45 )  Alb: 2.7 g/dL / Pro: 5.0 g/dL / ALK PHOS: 141 U/L / ALT: 134 U/L / AST: 72 U/L / GGT: x             Culture - Blood (collected 03-28-20 @ 11:49)  Source: .Blood None  Gram Stain (03-30-20 @ 04:33):    Growth in aerobic bottle: Gram Positive Cocci in Clusters  Final Report (03-31-20 @ 09:43):    Growth in aerobic bottle: Staphylococcus epidermidis    Single set isolate, possible contaminant. Contact    Microbiology if susceptibility testing clinically    indicated.    ***Blood Panel PCR results on this specimen are available    approximately 3 hoursafter the Gram stain result.***    Gram stain, PCR, and/or culture results may not always    correspond due to difference in methodologies.    ************************************************************    This PCR assay was performed using [employer].    The following targets are tested for: Enterococcus,    vancomycin resistant enterococci, Listeria monocytogenes,    coagulase negative staphylococci, S. aureus,    methicillin resistant S. aureus, Streptococcus agalactiae    (Group B), S. pneumoniae, S. pyogenes (Group A),    Acinetobacter baumannii, Enterobacter cloacae, E. coli,    Klebsiella oxytoca, K. pneumoniae, Proteus sp.,    Serratia marcescens, Haemophilus influenzae,    Neisseria meningitidis, Pseudomonas aeruginosa, Candida    albicans, C. glabrata,C krusei, C parapsilosis,    C. tropicalis and the KPC resistance gene.  Organism: Blood Culture PCR (03-31-20 @ 09:43)  Organism: Blood Culture PCR (03-31-20 @ 09:43)      -  Coagulase negative Staphylococcus: Detec      Method Type: PCR    Culture - Urine (collected 03-28-20 @ 08:19)  Source: .Urine Catheterized  Final Report (03-29-20 @ 16:11):    No growth            RADIOLOGY & ADDITIONAL TESTS:  Personal review of radiological diagnostics performed  Echo and EKG results noted when applicable.     MEDICATIONS:  acetaminophen   Tablet .. 650 milliGRAM(s) Oral every 6 hours PRN  aMIOdarone    Tablet 200 milliGRAM(s) Oral two times a day  ascorbic acid 500 milliGRAM(s) Oral three times a day  chlorhexidine 0.12% Liquid 15 milliLiter(s) Oral Mucosa two times a day  chlorhexidine 4% Liquid 1 Application(s) Topical <User Schedule>  enoxaparin Injectable 65 milliGRAM(s) SubCutaneous every 12 hours  methylPREDNISolone sodium succinate Injectable 60 milliGRAM(s) IV Push two times a day  midazolam Infusion 0.08 mG/kG/Hr IV Continuous <Continuous>  morphine  Infusion 2 mG/Hr IV Continuous <Continuous>  pantoprazole   Suspension 40 milliGRAM(s) Oral daily  polyethylene glycol 3350 17 Gram(s) Oral daily  senna 2 Tablet(s) Oral at bedtime  thiamine IVPB 200 milliGRAM(s) IV Intermittent daily      ANTIBIOTICS:

## 2020-03-31 NOTE — PROGRESS NOTE ADULT - ASSESSMENT
IMPRESSION:    Acute Hypoxemic Respiratory Failure 2/2 COVID 19  ARDS  Sepsis present on admission  Septic shock resolved   H/O Afib on Eliquis    PLAN:    CNS: Continue sedation.  MSO4 2 mg per hr, Versed 2 mg per hour    HEENT: ET care ,oral care    PULMONARY:  HOB @ 45 degrees. Aspiration precautions. Vent changes as follows: [facility] network protocol/. PEEP 15.   Wean O2.  Repeat LActate     CARDIOVASCULAR: Avoid volume overload.  FU QTC.  Amiodarone oral.      GI: GI prophylaxis.  OG feeding, Bowel regimen.      RENAL:  Follow up lytes.  Correct as needed    INFECTIOUS DISEASE: Follow up cultures.      HEMATOLOGICAL:  DVT prophylaxis / Therapy.  On LMWH     ENDOCRINE:  Follow up FS.  Insulin protocol if needed.  Solumedrol D#3    MUSCULOSKELETAL: Bedrest    Lines: TLC     Disposition: MICU monitoring

## 2020-03-31 NOTE — PROGRESS NOTE ADULT - ASSESSMENT
· Assessment		  71 y/o female with pmh of a-fib on Eliquis , c/o fever, non-productive cough, body aches and decreased appetite x 7 days. No known sick contacts. No diarrhea. No ABD pain. No H/A, no neck pain. No dysuria/frequency/urgency. Presented with sob  No hemoptysis.  Patient admitted to internal medicine service for acute hypoxemic respiratory failure 2/2 covid19 .Patient intubated in CEU     IMPRESSION:  #resolving Severe septic shock ( ( still with lactate acidemia 3.3  ) secondary to COVID19 with critical disease  -ARDS with FiO2 60 %  -s/p Toci 3/24  -presently off all ABx  -CXR progressively stable with bibasilar opacities  -procal 0.06 > 0.11> 0.09which goes against a bacterial process  -no ARF  -ALT mild elevation  -BC 3/22 NG  -BCx 3/28 CoNS ( not a true pathogen )  -inflammatory markers increasing. Ferritin 1047    RECOMMENDATIONS:  -track Ferritin ( upward trend ) now decreasing  -prognosis is extremely poor given the cytokine storm

## 2020-04-01 LAB
ANION GAP SERPL CALC-SCNC: 12 MMOL/L — SIGNIFICANT CHANGE UP (ref 7–14)
BUN SERPL-MCNC: 26 MG/DL — HIGH (ref 10–20)
CALCIUM SERPL-MCNC: 9 MG/DL — SIGNIFICANT CHANGE UP (ref 8.5–10.1)
CHLORIDE SERPL-SCNC: 99 MMOL/L — SIGNIFICANT CHANGE UP (ref 98–110)
CO2 SERPL-SCNC: 34 MMOL/L — HIGH (ref 17–32)
CREAT SERPL-MCNC: 0.5 MG/DL — LOW (ref 0.7–1.5)
GLUCOSE SERPL-MCNC: 130 MG/DL — HIGH (ref 70–99)
HCT VFR BLD CALC: 37.1 % — SIGNIFICANT CHANGE UP (ref 37–47)
HGB BLD-MCNC: 12 G/DL — SIGNIFICANT CHANGE UP (ref 12–16)
IL6 FLD-MCNC: SIGNIFICANT CHANGE UP PG/ML (ref 0–15.5)
MCHC RBC-ENTMCNC: 29.8 PG — SIGNIFICANT CHANGE UP (ref 27–31)
MCHC RBC-ENTMCNC: 32.3 G/DL — SIGNIFICANT CHANGE UP (ref 32–37)
MCV RBC AUTO: 92.1 FL — SIGNIFICANT CHANGE UP (ref 81–99)
NRBC # BLD: 0 /100 WBCS — SIGNIFICANT CHANGE UP (ref 0–0)
PLATELET # BLD AUTO: 325 K/UL — SIGNIFICANT CHANGE UP (ref 130–400)
POTASSIUM SERPL-MCNC: 4.8 MMOL/L — SIGNIFICANT CHANGE UP (ref 3.5–5)
POTASSIUM SERPL-SCNC: 4.8 MMOL/L — SIGNIFICANT CHANGE UP (ref 3.5–5)
RBC # BLD: 4.03 M/UL — LOW (ref 4.2–5.4)
RBC # FLD: 14.3 % — SIGNIFICANT CHANGE UP (ref 11.5–14.5)
SODIUM SERPL-SCNC: 145 MMOL/L — SIGNIFICANT CHANGE UP (ref 135–146)
WBC # BLD: 11.93 K/UL — HIGH (ref 4.8–10.8)
WBC # FLD AUTO: 11.93 K/UL — HIGH (ref 4.8–10.8)

## 2020-04-01 PROCEDURE — 71045 X-RAY EXAM CHEST 1 VIEW: CPT | Mod: 26

## 2020-04-01 PROCEDURE — 74174 CTA ABD&PLVS W/CONTRAST: CPT | Mod: 26,CS

## 2020-04-01 RX ADMIN — Medication 60 MILLIGRAM(S): at 05:52

## 2020-04-01 RX ADMIN — Medication 650 MILLIGRAM(S): at 17:24

## 2020-04-01 RX ADMIN — Medication 60 MILLIGRAM(S): at 17:23

## 2020-04-01 RX ADMIN — Medication 102 MILLIGRAM(S): at 13:18

## 2020-04-01 RX ADMIN — AMIODARONE HYDROCHLORIDE 200 MILLIGRAM(S): 400 TABLET ORAL at 05:53

## 2020-04-01 RX ADMIN — ENOXAPARIN SODIUM 65 MILLIGRAM(S): 100 INJECTION SUBCUTANEOUS at 05:52

## 2020-04-01 RX ADMIN — Medication 500 MILLIGRAM(S): at 13:18

## 2020-04-01 RX ADMIN — SENNA PLUS 2 TABLET(S): 8.6 TABLET ORAL at 23:42

## 2020-04-01 RX ADMIN — MORPHINE SULFATE 2 MG/HR: 50 CAPSULE, EXTENDED RELEASE ORAL at 17:23

## 2020-04-01 RX ADMIN — CHLORHEXIDINE GLUCONATE 15 MILLILITER(S): 213 SOLUTION TOPICAL at 17:24

## 2020-04-01 RX ADMIN — Medication 500 MILLIGRAM(S): at 23:42

## 2020-04-01 RX ADMIN — Medication 500 MILLIGRAM(S): at 05:53

## 2020-04-01 RX ADMIN — AMIODARONE HYDROCHLORIDE 200 MILLIGRAM(S): 400 TABLET ORAL at 17:23

## 2020-04-01 RX ADMIN — PANTOPRAZOLE SODIUM 40 MILLIGRAM(S): 20 TABLET, DELAYED RELEASE ORAL at 11:26

## 2020-04-01 RX ADMIN — CHLORHEXIDINE GLUCONATE 1 APPLICATION(S): 213 SOLUTION TOPICAL at 05:52

## 2020-04-01 RX ADMIN — ENOXAPARIN SODIUM 65 MILLIGRAM(S): 100 INJECTION SUBCUTANEOUS at 17:25

## 2020-04-01 RX ADMIN — POLYETHYLENE GLYCOL 3350 17 GRAM(S): 17 POWDER, FOR SOLUTION ORAL at 11:26

## 2020-04-01 RX ADMIN — CHLORHEXIDINE GLUCONATE 15 MILLILITER(S): 213 SOLUTION TOPICAL at 05:53

## 2020-04-01 NOTE — PROGRESS NOTE ADULT - ASSESSMENT
· Assessment		  69 y/o female with pmh of a-fib on Eliquis , c/o fever, non-productive cough, body aches and decreased appetite x 7 days. No known sick contacts. No diarrhea. No ABD pain. No H/A, no neck pain. No dysuria/frequency/urgency. Presented with sob  No hemoptysis.  Patient admitted to internal medicine service for acute hypoxemic respiratory failure 2/2 covid19 .Patient intubated in CEU     IMPRESSION:  #resolving Severe septic shock ( ( still with lactate acidemia 3.3  ) secondary to COVID19 with critical disease  -ARDS with FiO2 60 %  -s/p Toci 3/24  -presently off all ABx  -CXR progressively stable with bibasilar opacities  -procal 0.06 > 0.11> 0.09 which goes against a bacterial process  -lactate acidemia : secondary to ischemia? no melena/BRBPR  -no ARF  -ALT mild elevation  -BC 3/22 NG  -BCx 3/28 CoNS ( not a true pathogen )  -inflammatory markers increasing. Ferritin 1047    RECOMMENDATIONS:  -f/u CT abdomen  -track lactate  -track Ferritin ( upward trend ) now decreasing  -prognosis is extremely poor given the cytokine storm

## 2020-04-01 NOTE — PROGRESS NOTE ADULT - ASSESSMENT
Assessment and Plan:   · Assessment		  IMPRESSION:    Acute Hypoxemic Respiratory Failure 2/2 COVID 19  ARDS  Sepsis present on admission  Septic shock resolved   H/O Afib on Eliquis    PLAN:    CNS: Continue sedation.  MSO4 2 mg per hr, Versed 2 mg per hour    HEENT: ET care ,oral care    PULMONARY:  HOB @ 45 degrees. Aspiration precautions. Vent changes as follows: [facility] network protocol/. PEEP 15.   Wean O2.  Repeat LActate     CARDIOVASCULAR: Avoid volume overload.  FU QTC.  Amiodarone oral.      GI: GI prophylaxis.  OG feeding, Bowel regimen.      RENAL:  Follow up lytes.  Correct as needed    INFECTIOUS DISEASE: Follow up cultures.      HEMATOLOGICAL:  DVT prophylaxis / Therapy.  On LMWH     ENDOCRINE:  Follow up FS.  Insulin protocol if needed.  Solumedrol D#3    MUSCULOSKELETAL: Bedrest    Lines: TLC     Disposition: MICU monitoring

## 2020-04-01 NOTE — PROGRESS NOTE ADULT - SUBJECTIVE AND OBJECTIVE BOX
Patient is a 70y old  Female who presents with a chief complaint of covid r/o given fevers, non productive cough (31 Mar 2020 13:30)    Over Night Events:      ROS:     CONSTITUTIONAL:   no fever   no chills.  no weight gain   no weight loss    EYES:   no discharge,   no pain  no redness,   no visual changes.    ENT:   Ears: no ear pain and no hearing problems.  Nose: no nasal congestion and no nasal drainage.  Mouth/Throat: no dysphagia,  no hoarseness and no throat pain.  Neck: no lumps, no pain, no stiffness and no swollen glands.     CARDIOVASCULAR:   no chest pain,   no swelling  no palpitaions  no syncope    RESPIRATORY:  no SOB,  no wheezing ,  no respiratory difficulty  no sputum production    GASTROINTESTINAL:   no abdominal pain,   no constipation,   no diarrhea,   no vomiting.    GENITOURINARY:  no dysuria,   no frequency,   no urgency  no hematuria.    MUSCULOSKELETAL:   no back pain,   no musculoskeletal pain,  no weakness.    SKIN:   no jaundice,   no lesions,   no pruritis,   no rashes.    NEURO:   no loss of consciousness,   no gait abnormality,   no headache,   no sensory deficits,   no weakness.    PSYCHIATRIC:   no known mental health issues  no anxiety  no depression    ALLERGIC/IMMUNOLOGIC:   No active allergic or immunologic issues        PHYSICAL EXAM    ICU Vital Signs Last 24 Hrs  T(C): 36.6 (01 Apr 2020 00:00), Max: 36.6 (31 Mar 2020 13:00)  T(F): 97.8 (01 Apr 2020 00:00), Max: 97.9 (31 Mar 2020 13:00)  HR: 92 (01 Apr 2020 03:00) (68 - 118)  BP: 152/69 (01 Apr 2020 03:00) (101/72 - 180/77)  BP(mean): 98 (01 Apr 2020 03:00) (76 - 111)  ABP: --  ABP(mean): --  RR: 12 (01 Apr 2020 03:00) (12 - 45)  SpO2: 97% (01 Apr 2020 03:00) (93% - 99%)      CONSTITUTIONAL:   Ill appearing.  Well nourished.  NAD    ENT:   Airway patent,   Mouth with normal mucosa.   No thrush    CARDIAC:   Normal rate,   Regular rhythm.     No edema      RESPIRATORY:   NO wheezing  Bilateral BS  Normal chest expansion  Not tachypneic,  No use of accessory muscles    GASTROINTESTINAL:  Abdomen soft,   Non-tender,   No guarding,   + BS    MUSCULOSKELETAL:   Range of motion is not limited,  No clubbing, cyanosis    NEUROLOGICAL:   Alert and oriented   No motor  deficits.  pertinent DTRs normal    SKIN:   Skin normal color for race,   warm, dry   No evidence of rash.        03-30-20 @ 07:01  -  03-31-20 @ 07:00  --------------------------------------------------------  IN:    midazolam Infusion: 73.6 mL    morphine  Infusion: 94 mL    norepinephrine Infusion: 58.5 mL    Osmolite: 900 mL    vasopressin Infusion: 57.6 mL  Total IN: 1183.7 mL    OUT:    Indwelling Catheter - Urethral: 925 mL  Total OUT: 925 mL    Total NET: 258.7 mL      03-31-20 @ 07:01  -  04-01-20 @ 05:52  --------------------------------------------------------  IN:    midazolam Infusion: 11.7 mL    morphine  Infusion: 36 mL    Oral Fluid: 300 mL    Osmolite: 600 mL    propofol Infusion: 105.9 mL  Total IN: 1053.6 mL    OUT:    Indwelling Catheter - Urethral: 1485 mL  Total OUT: 1485 mL    Total NET: -431.4 mL          LABS:                                                                                                         LIVER FUNCTIONS - ( 31 Mar 2020 04:45 )  Alb: 2.7 g/dL / Pro: 5.0 g/dL / ALK PHOS: 141 U/L / ALT: 134 U/L / AST: 72 U/L / GGT: x                                                                                               Mode: AC/ CMV (Assist Control/ Continuous Mandatory Ventilation)  RR (machine): 24  TV (machine): 400  FiO2: 60  PEEP: 15  ITime: 1  MAP: 26  PIP: 32                                      ABG - ( 01 Apr 2020 02:57 )  pH, Arterial: 7.48  pH, Blood: x     /  pCO2: 50    /  pO2: 78    / HCO3: 37    / Base Excess: 11.7  /  SaO2: 97                  MEDICATIONS  (STANDING):  aMIOdarone    Tablet 200 milliGRAM(s) Oral two times a day  ascorbic acid 500 milliGRAM(s) Oral three times a day  chlorhexidine 0.12% Liquid 15 milliLiter(s) Oral Mucosa two times a day  chlorhexidine 4% Liquid 1 Application(s) Topical <User Schedule>  enoxaparin Injectable 65 milliGRAM(s) SubCutaneous every 12 hours  methylPREDNISolone sodium succinate Injectable 60 milliGRAM(s) IV Push two times a day  morphine  Infusion 2 mG/Hr (2 mL/Hr) IV Continuous <Continuous>  pantoprazole   Suspension 40 milliGRAM(s) Oral daily  polyethylene glycol 3350 17 Gram(s) Oral daily  propofol Infusion 10 MICROgram(s)/kG/Min (3.93 mL/Hr) IV Continuous <Continuous>  senna 2 Tablet(s) Oral at bedtime  thiamine IVPB 200 milliGRAM(s) IV Intermittent daily    MEDICATIONS  (PRN):  acetaminophen   Tablet .. 650 milliGRAM(s) Oral every 6 hours PRN Temp greater or equal to 38C (100.4F)      New X-rays reviewed:                                                                                  ECHO: NA    CXR interpreted by me:

## 2020-04-01 NOTE — PROGRESS NOTE ADULT - ASSESSMENT
IMPRESSION:    Acute Hypoxemic Respiratory Failure 2/2 COVID 19  ARDS  Sepsis present on admission  Septic shock resolved   H/O Afib on Eliquis    PLAN:    CNS: SAT.  Continue sedation.     HEENT: ET care ,oral care    PULMONARY:  HOB @ 45 degrees. Aspiration precautions. Vent changes as follows:  Wean O2.       CARDIOVASCULAR: Avoid volume overload.  FU QTC.  Amiodarone oral.      GI: GI prophylaxis.  OG feeding, Bowel regimen.      RENAL:  Follow up lytes.  Correct as needed    INFECTIOUS DISEASE: Follow up cultures.      HEMATOLOGICAL:  DVT prophylaxis / Therapy.  On LMWH     ENDOCRINE:  Follow up FS.  Insulin protocol if needed.  Solumedrol D#4    MUSCULOSKELETAL: Bedrest    Lines: TLC     Disposition: MICU monitoring

## 2020-04-01 NOTE — PROGRESS NOTE ADULT - SUBJECTIVE AND OBJECTIVE BOX
Patient is a 70y old  Female who presents with a chief complaint of covid r/o given fevers, non productive cough (01 Apr 2020 08:15)        Over Night Events: remains critically ill on MV.  Off pressors.  Sedated.  SP CT abdomen         ROS:     All ROS are negative except HPI         PHYSICAL EXAM    ICU Vital Signs Last 24 Hrs  T(C): 36.1 (01 Apr 2020 08:00), Max: 36.6 (31 Mar 2020 13:00)  T(F): 97 (01 Apr 2020 08:00), Max: 97.9 (31 Mar 2020 13:00)  HR: 102 (01 Apr 2020 09:00) (89 - 118)  BP: 144/90 (01 Apr 2020 09:00) (102/56 - 180/77)  BP(mean): 117 (01 Apr 2020 09:00) (76 - 122)  ABP: --  ABP(mean): --  RR: 22 (01 Apr 2020 09:00) (12 - 45)  SpO2: 97% (01 Apr 2020 09:00) (93% - 99%)      CONSTITUTIONAL:   Ill appearing.  Well nourished.  NAD    ENT:   Airway patent,   Mouth with normal mucosa.   No thrush    EYES:   Pupils equal,   Round and reactive to light.    CARDIAC:   Normal rate,   Regular rhythm.    No edema      Vascular:  Normal systolic impulse  No Carotid bruits    RESPIRATORY:   No wheezing  Bilateral BS  Normal chest expansion  Not tachypneic,  No use of accessory muscles    GASTROINTESTINAL:  Abdomen soft,   Non-tender,   No guarding,   + BS    GENITOURINARY  normal genitalia for sex  no edema    MUSCULOSKELETAL:   Range of motion is not limited,  No clubbing, cyanosis    NEUROLOGICAL:   Sedated  Withdraws to pain     SKIN:   Skin normal color for race,   Warm and dry and intact.   No evidence of rash.    PSYCHIATRIC:   Sedated   No apparent risk to self or others.    HEMATOLOGICAL:  No cervical  lymphadenopathy.  no inguinal lymphadenopathy      03-31-20 @ 07:01  -  04-01-20 @ 07:00  --------------------------------------------------------  IN:    midazolam Infusion: 11.7 mL    morphine  Infusion: 42 mL    Oral Fluid: 300 mL    Osmolite: 600 mL    propofol Infusion: 141.3 mL  Total IN: 1095 mL    OUT:    Indwelling Catheter - Urethral: 1613 mL  Total OUT: 1613 mL    Total NET: -518 mL      04-01-20 @ 07:01  -  04-01-20 @ 09:57  --------------------------------------------------------  IN:    morphine  Infusion: 4 mL    propofol Infusion: 23.6 mL  Total IN: 27.6 mL    OUT:    Indwelling Catheter - Urethral: 38 mL  Total OUT: 38 mL    Total NET: -10.4 mL          LABS:                            12.0   11.93 )-----------( 325      ( 01 Apr 2020 05:45 )             37.1                                               04-01    145  |  99  |  26<H>  ----------------------------<  130<H>  4.8   |  34<H>  |  0.5<L>    Ca    9.0      01 Apr 2020 05:45  Mg     2.8     03-31    TPro  5.0<L>  /  Alb  2.7<L>  /  TBili  0.2  /  DBili  x   /  AST  72<H>  /  ALT  134<H>  /  AlkPhos  141<H>  03-31                                                                                           LIVER FUNCTIONS - ( 31 Mar 2020 04:45 )  Alb: 2.7 g/dL / Pro: 5.0 g/dL / ALK PHOS: 141 U/L / ALT: 134 U/L / AST: 72 U/L / GGT: x                                                                                               Mode: AC/ CMV (Assist Control/ Continuous Mandatory Ventilation)  RR (machine): 24  TV (machine): 400  FiO2: 60  PEEP: 15  ITime: 1  MAP: 26  PIP: 32                                      ABG - ( 01 Apr 2020 02:57 )  pH, Arterial: 7.48  pH, Blood: x     /  pCO2: 50    /  pO2: 78    / HCO3: 37    / Base Excess: 11.7  /  SaO2: 97    Lac 3.3  PPL 24              MEDICATIONS  (STANDING):  aMIOdarone    Tablet 200 milliGRAM(s) Oral two times a day  ascorbic acid 500 milliGRAM(s) Oral three times a day  chlorhexidine 0.12% Liquid 15 milliLiter(s) Oral Mucosa two times a day  chlorhexidine 4% Liquid 1 Application(s) Topical <User Schedule>  enoxaparin Injectable 65 milliGRAM(s) SubCutaneous every 12 hours  methylPREDNISolone sodium succinate Injectable 60 milliGRAM(s) IV Push two times a day  morphine  Infusion 2 mG/Hr (2 mL/Hr) IV Continuous <Continuous>  pantoprazole   Suspension 40 milliGRAM(s) Oral daily  polyethylene glycol 3350 17 Gram(s) Oral daily  propofol Infusion 10 MICROgram(s)/kG/Min (3.93 mL/Hr) IV Continuous <Continuous>  senna 2 Tablet(s) Oral at bedtime  thiamine IVPB 200 milliGRAM(s) IV Intermittent daily    MEDICATIONS  (PRN):  acetaminophen   Tablet .. 650 milliGRAM(s) Oral every 6 hours PRN Temp greater or equal to 38C (100.4F)      New X-rays reviewed:                                                                                  ECHO    CXR interpreted by me:

## 2020-04-01 NOTE — PROGRESS NOTE ADULT - SUBJECTIVE AND OBJECTIVE BOX
CLIFTON LYNN  70y, Female    All available historical data reviewed    OVERNIGHT EVENTS:    none  ROS:  unable to obtain history secondary to patient's mental status and/or sedation     VITALS:  T(F): 97.6, Max: 97.9 (03-31-20 @ 13:00)  HR: 98  BP: 153/81  RR: 14Vital Signs Last 24 Hrs  T(C): 36.4 (01 Apr 2020 04:00), Max: 36.6 (31 Mar 2020 13:00)  T(F): 97.6 (01 Apr 2020 04:00), Max: 97.9 (31 Mar 2020 13:00)  HR: 98 (01 Apr 2020 07:00) (89 - 118)  BP: 153/81 (01 Apr 2020 07:00) (101/72 - 180/77)  BP(mean): 122 (01 Apr 2020 07:00) (76 - 122)  RR: 14 (01 Apr 2020 07:00) (12 - 45)  SpO2: 97% (01 Apr 2020 07:00) (93% - 99%)    TESTS & MEASUREMENTS:                        12.0   11.93 )-----------( 325      ( 01 Apr 2020 05:45 )             37.1     04-01    145  |  99  |  26<H>  ----------------------------<  130<H>  4.8   |  34<H>  |  0.5<L>    Ca    9.0      01 Apr 2020 05:45  Mg     2.8     03-31    TPro  5.0<L>  /  Alb  2.7<L>  /  TBili  0.2  /  DBili  x   /  AST  72<H>  /  ALT  134<H>  /  AlkPhos  141<H>  03-31    LIVER FUNCTIONS - ( 31 Mar 2020 04:45 )  Alb: 2.7 g/dL / Pro: 5.0 g/dL / ALK PHOS: 141 U/L / ALT: 134 U/L / AST: 72 U/L / GGT: x             Culture - Blood (collected 03-28-20 @ 11:49)  Source: .Blood None  Gram Stain (03-30-20 @ 04:33):    Growth in aerobic bottle: Gram Positive Cocci in Clusters  Final Report (03-31-20 @ 09:43):    Growth in aerobic bottle: Staphylococcus epidermidis    Single set isolate, possible contaminant. Contact    Microbiology if susceptibility testing clinically    indicated.    ***Blood Panel PCR results on this specimen are available    approximately 3 hoursafter the Gram stain result.***    Gram stain, PCR, and/or culture results may not always    correspond due to difference in methodologies.    ************************************************************    This PCR assay was performed using [employer].    The following targets are tested for: Enterococcus,    vancomycin resistant enterococci, Listeria monocytogenes,    coagulase negative staphylococci, S. aureus,    methicillin resistant S. aureus, Streptococcus agalactiae    (Group B), S. pneumoniae, S. pyogenes (Group A),    Acinetobacter baumannii, Enterobacter cloacae, E. coli,    Klebsiella oxytoca, K. pneumoniae, Proteus sp.,    Serratia marcescens, Haemophilus influenzae,    Neisseria meningitidis, Pseudomonas aeruginosa, Candida    albicans, C. glabrata,C krusei, C parapsilosis,    C. tropicalis and the KPC resistance gene.  Organism: Blood Culture PCR (03-31-20 @ 09:43)  Organism: Blood Culture PCR (03-31-20 @ 09:43)      -  Coagulase negative Staphylococcus: Detec      Method Type: PCR    Culture - Urine (collected 03-28-20 @ 08:19)  Source: .Urine Catheterized  Final Report (03-29-20 @ 16:11):    No growth            RADIOLOGY & ADDITIONAL TESTS:  Personal review of radiological diagnostics performed  Echo and EKG results noted when applicable.     MEDICATIONS:  acetaminophen   Tablet .. 650 milliGRAM(s) Oral every 6 hours PRN  aMIOdarone    Tablet 200 milliGRAM(s) Oral two times a day  ascorbic acid 500 milliGRAM(s) Oral three times a day  chlorhexidine 0.12% Liquid 15 milliLiter(s) Oral Mucosa two times a day  chlorhexidine 4% Liquid 1 Application(s) Topical <User Schedule>  enoxaparin Injectable 65 milliGRAM(s) SubCutaneous every 12 hours  methylPREDNISolone sodium succinate Injectable 60 milliGRAM(s) IV Push two times a day  morphine  Infusion 2 mG/Hr IV Continuous <Continuous>  pantoprazole   Suspension 40 milliGRAM(s) Oral daily  polyethylene glycol 3350 17 Gram(s) Oral daily  propofol Infusion 10 MICROgram(s)/kG/Min IV Continuous <Continuous>  senna 2 Tablet(s) Oral at bedtime  thiamine IVPB 200 milliGRAM(s) IV Intermittent daily      ANTIBIOTICS:

## 2020-04-01 NOTE — PROGRESS NOTE ADULT - SUBJECTIVE AND OBJECTIVE BOX
CLIFTON LYNN 70y Female  MRN#: [id number]       SUBJECTIVE  Patient is a 70y old Female who presents with a chief complaint of covid r/o given fevers, non productive cough (01 Apr 2020 09:57)  Currently admitted to medicine with the primary diagnosis of Sepsis  Today is hospital day 10d, and this morning she is intubated, sedated.     OBJECTIVE  PAST MEDICAL & SURGICAL HISTORY  Afib    ALLERGIES:  Originally Entered as [Unknown] reaction to [green pepper] (Unknown)  Originally Entered as [Unknown] reaction to [pepper] (Unknown)  sulfa drugs (Unknown)    MEDICATIONS:  STANDING MEDICATIONS  aMIOdarone    Tablet 200 milliGRAM(s) Oral two times a day  ascorbic acid 500 milliGRAM(s) Oral three times a day  chlorhexidine 0.12% Liquid 15 milliLiter(s) Oral Mucosa two times a day  chlorhexidine 4% Liquid 1 Application(s) Topical <User Schedule>  enoxaparin Injectable 65 milliGRAM(s) SubCutaneous every 12 hours  methylPREDNISolone sodium succinate Injectable 60 milliGRAM(s) IV Push two times a day  morphine  Infusion 2 mG/Hr IV Continuous <Continuous>  pantoprazole   Suspension 40 milliGRAM(s) Oral daily  polyethylene glycol 3350 17 Gram(s) Oral daily  propofol Infusion 10 MICROgram(s)/kG/Min IV Continuous <Continuous>  senna 2 Tablet(s) Oral at bedtime  thiamine IVPB 200 milliGRAM(s) IV Intermittent daily    PRN MEDICATIONS  acetaminophen   Tablet .. 650 milliGRAM(s) Oral every 6 hours PRN      VITAL SIGNS: Last 24 Hours  T(C): 36.1 (01 Apr 2020 08:00), Max: 36.6 (31 Mar 2020 13:00)  T(F): 97 (01 Apr 2020 08:00), Max: 97.9 (31 Mar 2020 13:00)  HR: 102 (01 Apr 2020 09:00) (89 - 118)  BP: 144/90 (01 Apr 2020 09:00) (102/56 - 180/77)  BP(mean): 117 (01 Apr 2020 09:00) (81 - 122)  RR: 22 (01 Apr 2020 09:00) (12 - 45)  SpO2: 97% (01 Apr 2020 09:00) (93% - 99%)    LABS:                        12.0   11.93 )-----------( 325      ( 01 Apr 2020 05:45 )             37.1     04-01    145  |  99  |  26<H>  ----------------------------<  130<H>  4.8   |  34<H>  |  0.5<L>    Ca    9.0      01 Apr 2020 05:45  Mg     2.8     03-31    TPro  5.0<L>  /  Alb  2.7<L>  /  TBili  0.2  /  DBili  x   /  AST  72<H>  /  ALT  134<H>  /  AlkPhos  141<H>  03-31      ABG - ( 01 Apr 2020 02:57 )  pH, Arterial: 7.48  pH, Blood: x     /  pCO2: 50    /  pO2: 78    / HCO3: 37    / Base Excess: 11.7  /  SaO2: 97        Lactate, Blood: 5.4 mmol/L <HH> (03-31-20 @ 17:26)      PHYSICAL EXAM:        ASSESSMENT & PLAN CLIFTON LYNN 70y Female  MRN#: [id number]       SUBJECTIVE  Patient is a 70y old Female who presents with a chief complaint of covid r/o given fevers, non productive cough (01 Apr 2020 09:57)  Currently admitted to medicine with the primary diagnosis of Sepsis  Today is hospital day 10d, and this morning she is intubated, sedated.     OBJECTIVE  PAST MEDICAL & SURGICAL HISTORY  Afib    ALLERGIES:  Originally Entered as [Unknown] reaction to [green pepper] (Unknown)  Originally Entered as [Unknown] reaction to [pepper] (Unknown)  sulfa drugs (Unknown)    MEDICATIONS:  STANDING MEDICATIONS  aMIOdarone    Tablet 200 milliGRAM(s) Oral two times a day  ascorbic acid 500 milliGRAM(s) Oral three times a day  chlorhexidine 0.12% Liquid 15 milliLiter(s) Oral Mucosa two times a day  chlorhexidine 4% Liquid 1 Application(s) Topical <User Schedule>  enoxaparin Injectable 65 milliGRAM(s) SubCutaneous every 12 hours  methylPREDNISolone sodium succinate Injectable 60 milliGRAM(s) IV Push two times a day  morphine  Infusion 2 mG/Hr IV Continuous <Continuous>  pantoprazole   Suspension 40 milliGRAM(s) Oral daily  polyethylene glycol 3350 17 Gram(s) Oral daily  propofol Infusion 10 MICROgram(s)/kG/Min IV Continuous <Continuous>  senna 2 Tablet(s) Oral at bedtime  thiamine IVPB 200 milliGRAM(s) IV Intermittent daily    PRN MEDICATIONS  acetaminophen   Tablet .. 650 milliGRAM(s) Oral every 6 hours PRN      VITAL SIGNS: Last 24 Hours  T(C): 36.1 (01 Apr 2020 08:00), Max: 36.6 (31 Mar 2020 13:00)  T(F): 97 (01 Apr 2020 08:00), Max: 97.9 (31 Mar 2020 13:00)  HR: 102 (01 Apr 2020 09:00) (89 - 118)  BP: 144/90 (01 Apr 2020 09:00) (102/56 - 180/77)  BP(mean): 117 (01 Apr 2020 09:00) (81 - 122)  RR: 22 (01 Apr 2020 09:00) (12 - 45)  SpO2: 97% (01 Apr 2020 09:00) (93% - 99%)    LABS:                        12.0   11.93 )-----------( 325      ( 01 Apr 2020 05:45 )             37.1     04-01    145  |  99  |  26<H>  ----------------------------<  130<H>  4.8   |  34<H>  |  0.5<L>    Ca    9.0      01 Apr 2020 05:45  Mg     2.8     03-31    TPro  5.0<L>  /  Alb  2.7<L>  /  TBili  0.2  /  DBili  x   /  AST  72<H>  /  ALT  134<H>  /  AlkPhos  141<H>  03-31      ABG - ( 01 Apr 2020 02:57 )  pH, Arterial: 7.48  pH, Blood: x     /  pCO2: 50    /  pO2: 78    / HCO3: 37    / Base Excess: 11.7  /  SaO2: 97          PHYSICAL EXAM:  GENERAL: intubated/sedated, does not withdraw from pain  HEENT:  Atraumatic, Normocephalic. EOMI, PERRLA, conjunctiva and sclera clear, No JVD  PULMONARY: Clear to auscultation bilaterally; No wheeze  CARDIOVASCULAR: Regular rate and rhythm; No murmurs, rubs, or gallops  GASTROINTESTINAL: Soft, Nontender, Nondistended; Bowel sounds present  MUSCULOSKELETAL:  2+ Peripheral Pulses, No clubbing, cyanosis, mild BUE edema  NEUROLOGY: non-focal  SKIN: No rashLactate, Blood: 5.4 mmol/L <HH> (03-31-20 @ 17:26)      ASSESSMENT & PLAN  71 y/o female with pmh of a-fib, c/o fever, non-productive cough, body aches and decreased appetite x 7 days. No known sick contacts. No diarrhea. No ABD pain.   Patient was found to be covid +  She required intubated on 3/24/2020 for hypoxic respiratory failure.     # hypoxic respiratory failure due to COVID -19 +, requiring intubation  CXR: bilateral reticular infiltrates - improving on the right side  requiring lower o2 settings on vent   off pressors now  Completed course of UNSYN for possible aspiration PNA  Completed course of Plaquenil   - s/p 1 dose of tocilizumab  - will decrease fi02 today to 50%  - solumedrol day 4    # high lactate  - CT abdomen negative for mesenteric ischemia.     # Chronic A.fib with high rate A.fib  - resolved  - amiodarone   - Lovenox therapeutic     Diet: tube feeds  Lines: left IJ  Garcia present  DVT prophylaxis: Lovenox therapeutic  Ambulation: bedrest   Drips: propofol and morphine

## 2020-04-02 LAB
ALBUMIN SERPL ELPH-MCNC: 3.2 G/DL — LOW (ref 3.5–5.2)
ALP SERPL-CCNC: 265 U/L — HIGH (ref 30–115)
ALT FLD-CCNC: 240 U/L — HIGH (ref 0–41)
ANION GAP SERPL CALC-SCNC: 12 MMOL/L — SIGNIFICANT CHANGE UP (ref 7–14)
AST SERPL-CCNC: 106 U/L — HIGH (ref 0–41)
BILIRUB SERPL-MCNC: 0.7 MG/DL — SIGNIFICANT CHANGE UP (ref 0.2–1.2)
BUN SERPL-MCNC: 24 MG/DL — HIGH (ref 10–20)
CALCIUM SERPL-MCNC: 8.8 MG/DL — SIGNIFICANT CHANGE UP (ref 8.5–10.1)
CHLORIDE SERPL-SCNC: 98 MMOL/L — SIGNIFICANT CHANGE UP (ref 98–110)
CO2 SERPL-SCNC: 33 MMOL/L — HIGH (ref 17–32)
CREAT SERPL-MCNC: 0.6 MG/DL — LOW (ref 0.7–1.5)
CRP SERPL-MCNC: 0.28 MG/DL — SIGNIFICANT CHANGE UP (ref 0–0.4)
GLUCOSE SERPL-MCNC: 162 MG/DL — HIGH (ref 70–99)
HCT VFR BLD CALC: 37.6 % — SIGNIFICANT CHANGE UP (ref 37–47)
HGB BLD-MCNC: 12 G/DL — SIGNIFICANT CHANGE UP (ref 12–16)
MAGNESIUM SERPL-MCNC: 2.5 MG/DL — HIGH (ref 1.8–2.4)
MCHC RBC-ENTMCNC: 28.6 PG — SIGNIFICANT CHANGE UP (ref 27–31)
MCHC RBC-ENTMCNC: 31.9 G/DL — LOW (ref 32–37)
MCV RBC AUTO: 89.5 FL — SIGNIFICANT CHANGE UP (ref 81–99)
NRBC # BLD: 0 /100 WBCS — SIGNIFICANT CHANGE UP (ref 0–0)
PLATELET # BLD AUTO: 379 K/UL — SIGNIFICANT CHANGE UP (ref 130–400)
POTASSIUM SERPL-MCNC: 4.6 MMOL/L — SIGNIFICANT CHANGE UP (ref 3.5–5)
POTASSIUM SERPL-SCNC: 4.6 MMOL/L — SIGNIFICANT CHANGE UP (ref 3.5–5)
PROCALCITONIN SERPL-MCNC: 0.05 NG/ML — SIGNIFICANT CHANGE UP (ref 0.02–0.1)
PROT SERPL-MCNC: 5.9 G/DL — LOW (ref 6–8)
RBC # BLD: 4.2 M/UL — SIGNIFICANT CHANGE UP (ref 4.2–5.4)
RBC # FLD: 14.6 % — HIGH (ref 11.5–14.5)
SODIUM SERPL-SCNC: 143 MMOL/L — SIGNIFICANT CHANGE UP (ref 135–146)
WBC # BLD: 15.21 K/UL — HIGH (ref 4.8–10.8)
WBC # FLD AUTO: 15.21 K/UL — HIGH (ref 4.8–10.8)

## 2020-04-02 PROCEDURE — 71045 X-RAY EXAM CHEST 1 VIEW: CPT | Mod: 26

## 2020-04-02 PROCEDURE — 71045 X-RAY EXAM CHEST 1 VIEW: CPT | Mod: 26,77

## 2020-04-02 RX ORDER — THIAMINE MONONITRATE (VIT B1) 100 MG
100 TABLET ORAL DAILY
Refills: 0 | Status: DISCONTINUED | OUTPATIENT
Start: 2020-04-02 | End: 2020-04-16

## 2020-04-02 RX ADMIN — CHLORHEXIDINE GLUCONATE 15 MILLILITER(S): 213 SOLUTION TOPICAL at 06:10

## 2020-04-02 RX ADMIN — POLYETHYLENE GLYCOL 3350 17 GRAM(S): 17 POWDER, FOR SOLUTION ORAL at 11:50

## 2020-04-02 RX ADMIN — CHLORHEXIDINE GLUCONATE 15 MILLILITER(S): 213 SOLUTION TOPICAL at 17:13

## 2020-04-02 RX ADMIN — Medication 500 MILLIGRAM(S): at 06:10

## 2020-04-02 RX ADMIN — ENOXAPARIN SODIUM 65 MILLIGRAM(S): 100 INJECTION SUBCUTANEOUS at 17:13

## 2020-04-02 RX ADMIN — Medication 102 MILLIGRAM(S): at 14:15

## 2020-04-02 RX ADMIN — PANTOPRAZOLE SODIUM 40 MILLIGRAM(S): 20 TABLET, DELAYED RELEASE ORAL at 11:51

## 2020-04-02 RX ADMIN — CHLORHEXIDINE GLUCONATE 1 APPLICATION(S): 213 SOLUTION TOPICAL at 06:10

## 2020-04-02 RX ADMIN — AMIODARONE HYDROCHLORIDE 200 MILLIGRAM(S): 400 TABLET ORAL at 17:13

## 2020-04-02 RX ADMIN — Medication 60 MILLIGRAM(S): at 06:09

## 2020-04-02 RX ADMIN — ENOXAPARIN SODIUM 65 MILLIGRAM(S): 100 INJECTION SUBCUTANEOUS at 06:10

## 2020-04-02 RX ADMIN — AMIODARONE HYDROCHLORIDE 200 MILLIGRAM(S): 400 TABLET ORAL at 06:10

## 2020-04-02 NOTE — PROGRESS NOTE ADULT - ASSESSMENT
IMPRESSION:    Acute Hypoxemic Respiratory Failure 2/2 COVID 19  ARDS  Sepsis present on admission  Septic shock resolved   H/O Afib on Eliquis    PLAN:    CNS: SAT.  Continue sedation.     HEENT: ET care ,oral care    PULMONARY:  HOB @ 45 degrees. Aspiration precautions. Vent changes as follows:  Wean O2.       CARDIOVASCULAR: Avoid volume overload.  FU QTC.  Amiodarone oral.      GI: GI prophylaxis.  OG feeding, Bowel regimen.      RENAL:  Follow up lytes.  Correct as needed    INFECTIOUS DISEASE: Follow up cultures.      HEMATOLOGICAL:  DVT prophylaxis / Therapy.  On LMWH     ENDOCRINE:  Follow up FS.  Insulin protocol if needed.  Solumedrol D#5    MUSCULOSKELETAL: Bedrest    Lines: TLC     Disposition: MICU monitoring

## 2020-04-02 NOTE — PROGRESS NOTE ADULT - SUBJECTIVE AND OBJECTIVE BOX
Patient is a 70y old  Female who presents with a chief complaint of covid r/o given fevers, non productive cough (01 Apr 2020 11:17)        Over Night Events:  Remaoins on MV.  Off pressors.  Sedated         ROS:     All ROS are negative except HPI         PHYSICAL EXAM    ICU Vital Signs Last 24 Hrs  T(C): 36.3 (02 Apr 2020 08:00), Max: 37.1 (02 Apr 2020 04:00)  T(F): 97.3 (02 Apr 2020 08:00), Max: 98.8 (02 Apr 2020 04:00)  HR: 90 (02 Apr 2020 09:11) (84 - 108)  BP: 151/77 (02 Apr 2020 09:11) (95/55 - 172/82)  BP(mean): 99 (02 Apr 2020 09:11) (69 - 124)  ABP: --  ABP(mean): --  RR: 16 (02 Apr 2020 09:11) (13 - 22)  SpO2: 97% (02 Apr 2020 09:11) (94% - 99%)      CONSTITUTIONAL:   Ill appearing.  Well nourished.  NAD    ENT:   Airway patent,   Mouth with normal mucosa.   No thrush    EYES:   Pupils equal,   Round and reactive to light.    CARDIAC:   Normal rate,   Regular rhythm.    No edema      Vascular:  Normal systolic impulse  No Carotid bruits    RESPIRATORY:   No wheezing  Bilateral BS  Normal chest expansion  Not tachypneic,  No use of accessory muscles    GASTROINTESTINAL:  Abdomen soft,   Non-tender,   No guarding,   + BS    GENITOURINARY  normal genitalia for sex  no edema    MUSCULOSKELETAL:   Range of motion is not limited,  No clubbing, cyanosis    NEUROLOGICAL:   Sedated     SKIN:   Skin normal color for race,   Warm and dry and intact.   No evidence of rash.    PSYCHIATRIC:   Sedated   No apparent risk to self or others.    HEMATOLOGICAL:  No cervical  lymphadenopathy.  no inguinal lymphadenopathy      04-01-20 @ 07:01  -  04-02-20 @ 07:00  --------------------------------------------------------  IN:    Free Water: 600 mL    morphine  Infusion: 48 mL    Osmolite: 630 mL    propofol Infusion: 371.9 mL  Total IN: 1649.9 mL    OUT:    Indwelling Catheter - Urethral: 1383 mL  Total OUT: 1383 mL    Total NET: 266.9 mL      04-02-20 @ 07:01  -  04-02-20 @ 09:49  --------------------------------------------------------  IN:    morphine  Infusion: 5 mL    propofol Infusion: 39.4 mL  Total IN: 44.4 mL    OUT:    Indwelling Catheter - Urethral: 125 mL  Total OUT: 125 mL    Total NET: -80.6 mL          LABS:                            12.0   15.21 )-----------( 379      ( 02 Apr 2020 06:40 )             37.6                 12.0   15.21 )-----------( 379      ( 04-02 @ 06:40 )             37.6                12.0   11.93 )-----------( 325      ( 04-01 @ 05:45 )             37.1                10.5   7.35  )-----------( 287      ( 03-31 @ 04:45 )             34.4                                                  04-02    143  |  98  |  24<H>  ----------------------------<  162<H>  4.6   |  33<H>  |  0.6<L>    Ca    8.8      02 Apr 2020 06:40  Mg     2.5     04-02    TPro  5.9<L>  /  Alb  3.2<L>  /  TBili  0.7  /  DBili  x   /  AST  106<H>  /  ALT  240<H>  /  AlkPhos  265<H>  04-02                                                                                           LIVER FUNCTIONS - ( 02 Apr 2020 06:40 )  Alb: 3.2 g/dL / Pro: 5.9 g/dL / ALK PHOS: 265 U/L / ALT: 240 U/L / AST: 106 U/L / GGT: x                                                                                               Mode: AC/ CMV (Assist Control/ Continuous Mandatory Ventilation)  RR (machine): 24  TV (machine): 400  FiO2: 50  PEEP: 15  ITime: 1  MAP: 21  PIP: 24                                      ABG - ( 02 Apr 2020 03:22 )  pH, Arterial: 7.48  pH, Blood: x     /  pCO2: 48    /  pO2: 77    / HCO3: 36    / Base Excess: 10.9  /  SaO2: 96                  MEDICATIONS  (STANDING):  aMIOdarone    Tablet 200 milliGRAM(s) Oral two times a day  ascorbic acid 500 milliGRAM(s) Oral three times a day  chlorhexidine 0.12% Liquid 15 milliLiter(s) Oral Mucosa two times a day  chlorhexidine 4% Liquid 1 Application(s) Topical <User Schedule>  enoxaparin Injectable 65 milliGRAM(s) SubCutaneous every 12 hours  methylPREDNISolone sodium succinate Injectable 60 milliGRAM(s) IV Push two times a day  morphine  Infusion 2 mG/Hr (2 mL/Hr) IV Continuous <Continuous>  pantoprazole   Suspension 40 milliGRAM(s) Oral daily  polyethylene glycol 3350 17 Gram(s) Oral daily  propofol Infusion 10 MICROgram(s)/kG/Min (3.93 mL/Hr) IV Continuous <Continuous>  senna 2 Tablet(s) Oral at bedtime  thiamine IVPB 200 milliGRAM(s) IV Intermittent daily    MEDICATIONS  (PRN):  acetaminophen   Tablet .. 650 milliGRAM(s) Oral every 6 hours PRN Temp greater or equal to 38C (100.4F)      New X-rays reviewed:                                                                                  ECHO    CXR interpreted by me:

## 2020-04-02 NOTE — PROGRESS NOTE ADULT - SUBJECTIVE AND OBJECTIVE BOX
CLIFOTN LYNN 70y Female  MRN#: [id number]     SUBJECTIVE  Patient is a 70y old Female who presents with a chief complaint of covid r/o given fevers, non productive cough (02 Apr 2020 09:49)  Currently admitted to medicine with the primary diagnosis of Sepsis  Today is hospital day 11d, and this morning she is intubated, sedated, looks uncomfortable    OBJECTIVE  PAST MEDICAL & SURGICAL HISTORY  Afib    ALLERGIES:  Originally Entered as [Unknown] reaction to [green pepper] (Unknown)  Originally Entered as [Unknown] reaction to [pepper] (Unknown)  sulfa drugs (Unknown)    MEDICATIONS:  STANDING MEDICATIONS  aMIOdarone    Tablet 200 milliGRAM(s) Oral two times a day  ascorbic acid 500 milliGRAM(s) Oral three times a day  chlorhexidine 0.12% Liquid 15 milliLiter(s) Oral Mucosa two times a day  chlorhexidine 4% Liquid 1 Application(s) Topical <User Schedule>  enoxaparin Injectable 65 milliGRAM(s) SubCutaneous every 12 hours  methylPREDNISolone sodium succinate Injectable 60 milliGRAM(s) IV Push two times a day  morphine  Infusion 2 mG/Hr IV Continuous <Continuous>  pantoprazole   Suspension 40 milliGRAM(s) Oral daily  polyethylene glycol 3350 17 Gram(s) Oral daily  propofol Infusion 10 MICROgram(s)/kG/Min IV Continuous <Continuous>  senna 2 Tablet(s) Oral at bedtime  thiamine IVPB 200 milliGRAM(s) IV Intermittent daily    PRN MEDICATIONS  acetaminophen   Tablet .. 650 milliGRAM(s) Oral every 6 hours PRN      VITAL SIGNS: Last 24 Hours  T(C): 36.3 (02 Apr 2020 08:00), Max: 37.1 (02 Apr 2020 04:00)  T(F): 97.3 (02 Apr 2020 08:00), Max: 98.8 (02 Apr 2020 04:00)  HR: 90 (02 Apr 2020 09:11) (84 - 108)  BP: 151/77 (02 Apr 2020 09:11) (95/55 - 172/82)  BP(mean): 99 (02 Apr 2020 09:11) (69 - 124)  RR: 16 (02 Apr 2020 09:11) (13 - 22)  SpO2: 97% (02 Apr 2020 09:11) (94% - 99%)    LABS:                        12.0   15.21 )-----------( 379      ( 02 Apr 2020 06:40 )             37.6     04-02    143  |  98  |  24<H>  ----------------------------<  162<H>  4.6   |  33<H>  |  0.6<L>    Ca    8.8      02 Apr 2020 06:40  Mg     2.5     04-02    TPro  5.9<L>  /  Alb  3.2<L>  /  TBili  0.7  /  DBili  x   /  AST  106<H>  /  ALT  240<H>  /  AlkPhos  265<H>  04-02      ABG - ( 02 Apr 2020 03:22 )  pH, Arterial: 7.48  pH, Blood: x     /  pCO2: 48    /  pO2: 77    / HCO3: 36    / Base Excess: 10.9  /  SaO2: 96        RADIOLOGY:    PHYSICAL EXAM:  GENERAL: intubated/sedated, does not withdraw from pain  HEENT:  Atraumatic, Normocephalic. EOMI, PERRLA, conjunctiva and sclera clear, No JVD  PULMONARY: Clear to auscultation bilaterally; No wheeze  CARDIOVASCULAR: Regular rate and rhythm; No murmurs, rubs, or gallops  GASTROINTESTINAL: Soft, Nontender, Nondistended; Bowel sounds present  MUSCULOSKELETAL:  2+ Peripheral Pulses, No clubbing, cyanosis, mild BUE edema  NEUROLOGY: non-focal  SKIN: No rash    ASSESSMENT & PLAN    71 y/o female with pmh of a-fib, c/o fever, non-productive cough, body aches and decreased appetite x 7 days. No known sick contacts. No diarrhea. No ABD pain.   Patient was found to be covid +  She required intubated on 3/24/2020 for hypoxic respiratory failure.     # hypoxic respiratory failure due to COVID -19 +, requiring intubation on 3/24  CXR: bilateral reticular infiltrates - improving on the right side  off pressors now  less o2 requirements (now fio2=40%)  Completed course of UNSYN for possible aspiration PNA  Completed course of Plaquenil   - s/p 1 dose of tocilizumab  - solumedrol day 5  - will try to switch to pressure support today     # high lactate  - CT abdomen negative for mesenteric ischemia.   - lactate downtrending    # Chronic A.fib with high rate A.fib  - resolved  - amiodarone   - Lovenox therapeutic     Diet: tube feeds  Lines: left IJ  Garcia present  DVT prophylaxis: Lovenox therapeutic  Ambulation: bedrest   Drips: propofol and morphine

## 2020-04-02 NOTE — CHART NOTE - NSCHARTNOTEFT_GEN_A_CORE
Registered Dietitian Follow-Up    ***Scroll to the bottom for RD recommendation***    Patient Profile Reviewed                           Yes [x]   No []  Nutrition History Previously Obtained        Yes []  No [x]          PERTINENT SUBJECTIVE INFORMATION (LATEST AS OF TODAY):  - pt remains intubated to ventilator, / 72, , Temp 36.8c, Ve 10.3,   - Feeding on hold per RN, due to vomiting/output from OG tube overnight. But per round today, will restart feeding  - IV propofol at 19.5cc/hr = 514 kcal/day from FAT  - high lactate      PERTINENT MEDICAL INFORMATIONS:  (1) Found with COVID19 - hypoxic resp failure due to COVID. Requiring intubation  (2) CXR b/l retincular infiltration. improving on R side.  (3) Off pressor.  (4) C/w Plaquenil course.  (5) high lactate- CT abd negative for mesenteric ischemia.           DIET ORDER:  OSMOLITE 1.5 at 35cc/hr + Prosurce TF packet x3 (ORDERED, BUT ON HOLD SINCE MIDNIGHT)        ANTHROPOMETRICS:  - Ht.  160cm  - Wt.  (3/19): 67.3kg  (3/24): 65.5kg  (3/29): 67.3kg - weight back to baseline, will continue to monitor trend.  - BMI. 25.6  - IBW.        PERTINENT LAB DATA:  4/2: WBC 15.2, BUN 24, Cr 0.6, glucose 162, albumin 3.2, LFT elevated, lactate 5.4, Mag 2.5, GFR normal.  PERTINENT MEDS:   enoxaparin, abx, propofol, acetaminophen, amiodarone, vitamin C, miralax, senna, b1, protonix,       PHYSICAL FINDINGS  - APPEARANCE:        intubated to ventilator, no edema noted, sedated  - GI FUNCTION:        LBM unknown, not documented on EMR  - TUBES:                     OGT  - ORAL/MOUTH:      NPO  - SKIN:                        Ecchymosis        NUTRITION REQUIREMENTS  WEIGHT USED:                          ABW is 67.3kg (from 3/19)  ESTIMATED ENERGY NEEDS:       CONTINUE [  ]      ADJUST [ x ]    ESTIMATED ENERGY NEEDS:         1342 kcal/day ([id number])  ESTIMATED PROTEIN NEEDS:        78-98 g/day (1.2-1.5 g/kg of ABW)  ESTIMATED FLUID NEEDS:             fluid per ICU team    CURRENT NUTRIENT NEEDS:     NPO today, will resume feeds later today.          [x  ] PREVIOUS NUTRITION DIAGNOSIS:   (1) inadequate protein-energy intake            [ x ] ONGOING        [  ] RESOLVED            PATIENT INTERVENTION:    [  ] ORAL        [x] EN/TF     GOAL/EXPECTED OUTCOME:     pt to meet % of estimated kcal/protein via TF upon f/u in 4 days. Pt to have 1BM/day.   INDICATOR/MONITORING:       RD to monitor diet order, energy intake, body composition, nutrition focused physical findings (EN tolerance, electrolytes, BM)  NUTRITION INTERVENTION:        Enteral nutrition       RECS: (1) Once lactate level is downtrending and not rising, may start with VITAL HP (low fat formula) at 15cc/hr, increase by 5cc q4hr or as tolerated per labs/tolerance, to GOAL of 35cc/hr with No-carb prosource TF x2pk/day. This regimen at GOAL gives a total of 910 kcal (+514 kcal from propofol)/ 94g protein/ 705mL free water, additional flushes per MICU team.

## 2020-04-03 LAB
ALBUMIN SERPL ELPH-MCNC: 2.7 G/DL — LOW (ref 3.5–5.2)
ALP SERPL-CCNC: 205 U/L — HIGH (ref 30–115)
ALT FLD-CCNC: 211 U/L — HIGH (ref 0–41)
ANION GAP SERPL CALC-SCNC: 11 MMOL/L — SIGNIFICANT CHANGE UP (ref 7–14)
AST SERPL-CCNC: 92 U/L — HIGH (ref 0–41)
BILIRUB SERPL-MCNC: 0.8 MG/DL — SIGNIFICANT CHANGE UP (ref 0.2–1.2)
BUN SERPL-MCNC: 23 MG/DL — HIGH (ref 10–20)
CALCIUM SERPL-MCNC: 8.1 MG/DL — LOW (ref 8.5–10.1)
CHLORIDE SERPL-SCNC: 100 MMOL/L — SIGNIFICANT CHANGE UP (ref 98–110)
CO2 SERPL-SCNC: 31 MMOL/L — SIGNIFICANT CHANGE UP (ref 17–32)
CREAT SERPL-MCNC: 0.5 MG/DL — LOW (ref 0.7–1.5)
GLUCOSE SERPL-MCNC: 217 MG/DL — HIGH (ref 70–99)
HCT VFR BLD CALC: 35.9 % — LOW (ref 37–47)
HGB BLD-MCNC: 11.2 G/DL — LOW (ref 12–16)
MCHC RBC-ENTMCNC: 28.3 PG — SIGNIFICANT CHANGE UP (ref 27–31)
MCHC RBC-ENTMCNC: 31.2 G/DL — LOW (ref 32–37)
MCV RBC AUTO: 90.7 FL — SIGNIFICANT CHANGE UP (ref 81–99)
NRBC # BLD: 0 /100 WBCS — SIGNIFICANT CHANGE UP (ref 0–0)
PLATELET # BLD AUTO: 297 K/UL — SIGNIFICANT CHANGE UP (ref 130–400)
POTASSIUM SERPL-MCNC: 4.3 MMOL/L — SIGNIFICANT CHANGE UP (ref 3.5–5)
POTASSIUM SERPL-SCNC: 4.3 MMOL/L — SIGNIFICANT CHANGE UP (ref 3.5–5)
PROCALCITONIN SERPL-MCNC: 0.05 NG/ML — SIGNIFICANT CHANGE UP (ref 0.02–0.1)
PROT SERPL-MCNC: 5.2 G/DL — LOW (ref 6–8)
RBC # BLD: 3.96 M/UL — LOW (ref 4.2–5.4)
RBC # FLD: 14.7 % — HIGH (ref 11.5–14.5)
SODIUM SERPL-SCNC: 142 MMOL/L — SIGNIFICANT CHANGE UP (ref 135–146)
WBC # BLD: 11.34 K/UL — HIGH (ref 4.8–10.8)
WBC # FLD AUTO: 11.34 K/UL — HIGH (ref 4.8–10.8)

## 2020-04-03 PROCEDURE — 71045 X-RAY EXAM CHEST 1 VIEW: CPT | Mod: 26

## 2020-04-03 PROCEDURE — 99221 1ST HOSP IP/OBS SF/LOW 40: CPT

## 2020-04-03 RX ORDER — NOREPINEPHRINE BITARTRATE/D5W 8 MG/250ML
0.05 PLASTIC BAG, INJECTION (ML) INTRAVENOUS
Qty: 16 | Refills: 0 | Status: DISCONTINUED | OUTPATIENT
Start: 2020-04-03 | End: 2020-04-03

## 2020-04-03 RX ORDER — SODIUM CHLORIDE 9 MG/ML
3 INJECTION INTRAMUSCULAR; INTRAVENOUS; SUBCUTANEOUS EVERY 12 HOURS
Refills: 0 | Status: DISCONTINUED | OUTPATIENT
Start: 2020-04-03 | End: 2020-04-05

## 2020-04-03 RX ORDER — SODIUM CHLORIDE 9 MG/ML
3 INJECTION INTRAMUSCULAR; INTRAVENOUS; SUBCUTANEOUS EVERY 12 HOURS
Refills: 0 | Status: DISCONTINUED | OUTPATIENT
Start: 2020-04-03 | End: 2020-04-03

## 2020-04-03 RX ORDER — LABETALOL HCL 100 MG
5 TABLET ORAL ONCE
Refills: 0 | Status: COMPLETED | OUTPATIENT
Start: 2020-04-03 | End: 2020-04-03

## 2020-04-03 RX ORDER — NOREPINEPHRINE BITARTRATE/D5W 8 MG/250ML
0.05 PLASTIC BAG, INJECTION (ML) INTRAVENOUS
Qty: 16 | Refills: 0 | Status: DISCONTINUED | OUTPATIENT
Start: 2020-04-03 | End: 2020-04-05

## 2020-04-03 RX ORDER — ALBUTEROL 90 UG/1
2.5 AEROSOL, METERED ORAL
Refills: 0 | Status: DISCONTINUED | OUTPATIENT
Start: 2020-04-03 | End: 2020-04-06

## 2020-04-03 RX ADMIN — ENOXAPARIN SODIUM 65 MILLIGRAM(S): 100 INJECTION SUBCUTANEOUS at 05:30

## 2020-04-03 RX ADMIN — POLYETHYLENE GLYCOL 3350 17 GRAM(S): 17 POWDER, FOR SOLUTION ORAL at 12:39

## 2020-04-03 RX ADMIN — CHLORHEXIDINE GLUCONATE 15 MILLILITER(S): 213 SOLUTION TOPICAL at 18:07

## 2020-04-03 RX ADMIN — AMIODARONE HYDROCHLORIDE 200 MILLIGRAM(S): 400 TABLET ORAL at 05:30

## 2020-04-03 RX ADMIN — PROPOFOL 3.93 MICROGRAM(S)/KG/MIN: 10 INJECTION, EMULSION INTRAVENOUS at 05:30

## 2020-04-03 RX ADMIN — Medication 100 MILLIGRAM(S): at 12:39

## 2020-04-03 RX ADMIN — ALBUTEROL 2.5 MILLIGRAM(S): 90 AEROSOL, METERED ORAL at 14:44

## 2020-04-03 RX ADMIN — PANTOPRAZOLE SODIUM 40 MILLIGRAM(S): 20 TABLET, DELAYED RELEASE ORAL at 12:39

## 2020-04-03 RX ADMIN — ENOXAPARIN SODIUM 65 MILLIGRAM(S): 100 INJECTION SUBCUTANEOUS at 18:07

## 2020-04-03 RX ADMIN — AMIODARONE HYDROCHLORIDE 200 MILLIGRAM(S): 400 TABLET ORAL at 18:06

## 2020-04-03 RX ADMIN — SODIUM CHLORIDE 3 MILLILITER(S): 9 INJECTION INTRAMUSCULAR; INTRAVENOUS; SUBCUTANEOUS at 14:46

## 2020-04-03 RX ADMIN — PROPOFOL 3.93 MICROGRAM(S)/KG/MIN: 10 INJECTION, EMULSION INTRAVENOUS at 16:23

## 2020-04-03 RX ADMIN — CHLORHEXIDINE GLUCONATE 15 MILLILITER(S): 213 SOLUTION TOPICAL at 05:30

## 2020-04-03 RX ADMIN — SENNA PLUS 2 TABLET(S): 8.6 TABLET ORAL at 02:24

## 2020-04-03 RX ADMIN — MORPHINE SULFATE 2 MG/HR: 50 CAPSULE, EXTENDED RELEASE ORAL at 19:20

## 2020-04-03 RX ADMIN — Medication 60 MILLIGRAM(S): at 00:00

## 2020-04-03 RX ADMIN — CHLORHEXIDINE GLUCONATE 1 APPLICATION(S): 213 SOLUTION TOPICAL at 05:30

## 2020-04-03 NOTE — PROGRESS NOTE ADULT - SUBJECTIVE AND OBJECTIVE BOX
Patient is a 70y old  Female who presents with a chief complaint of covid r/o given fevers, non productive cough (02 Apr 2020 10:59)    Over Night Events:  Mucus plugs suctioned; Propofol 60 morphine 8;     ROS:     All ROS are negative except HPI       PHYSICAL EXAM    ICU Vital Signs Last 24 Hrs  T(C): 36.1 (03 Apr 2020 06:00), Max: 37.7 (02 Apr 2020 17:00)  T(F): 97 (03 Apr 2020 06:00), Max: 99.9 (02 Apr 2020 17:00)  HR: 96 (03 Apr 2020 07:00) (72 - 108)  BP: 144/68 (03 Apr 2020 07:00) (71/43 - 170/73)  BP(mean): 104 (03 Apr 2020 07:00) (50 - 113)  RR: 15 (03 Apr 2020 07:00) (12 - 23)  SpO2: 97% (03 Apr 2020 07:00) (93% - 100%)    CONSTITUTIONAL:   Ill appearing.     ENT:   ETT +    EYES:   Pupils equal,   Round and reactive to light.    CARDIAC:   Normal rate,   Regular rhythm.    UE edema      Vascular:  Normal systolic impulse  No Carotid bruits    RESPIRATORY:   No wheezing  Bilateral BS  Normal chest expansion  Not tachypneic,  No use of accessory muscles    GASTROINTESTINAL:  Abdomen soft,   Non-tender,   No guarding,   + BS    GENITOURINARY  normal genitalia for sex  no edema    MUSCULOSKELETAL:   Range of motion is not limited,  No clubbing, cyanosis    NEUROLOGICAL:   Withdraws to pain     SKIN:   Skin normal color for race,   Warm and dry and intact.   No evidence of rash.    PSYCHIATRIC:   Normal mood and affect.   No apparent risk to self or others.    HEMATOLOGICAL:  No cervical  lymphadenopathy.  no inguinal lymphadenopathy      04-02-20 @ 07:01  -  04-03-20 @ 07:00  --------------------------------------------------------  IN:    Free Water: 600 mL    IV PiggyBack: 100 mL    morphine  Infusion: 63 mL    Osmolite: 1000 mL    propofol Infusion: 412 mL  Total IN: 2175 mL    OUT:    Indwelling Catheter - Urethral: 1378 mL  Total OUT: 1378 mL    Total NET: 797 mL        LABS:                            11.2   11.34 )-----------( 297      ( 03 Apr 2020 05:15 )             35.9                                                 04-03    142  |  100  |  23<H>  ----------------------------<  217<H>  4.3   |  31  |  0.5<L>    Ca    8.1<L>      03 Apr 2020 05:15  Mg     2.5     04-02    TPro  5.2<L>  /  Alb  2.7<L>  /  TBili  0.8  /  DBili  x   /  AST  92<H>  /  ALT  211<H>  /  AlkPhos  205<H>  04-03      LIVER FUNCTIONS - ( 03 Apr 2020 05:15 )  Alb: 2.7 g/dL / Pro: 5.2 g/dL / ALK PHOS: 205 U/L / ALT: 211 U/L / AST: 92 U/L / GGT: x                                              Culture - Blood (collected 01 Apr 2020 12:11)  Source: .Blood None  Preliminary Report (02 Apr 2020 22:01):    No growth to date.                                                  Mode: AC/ CMV (Assist Control/ Continuous Mandatory Ventilation)  RR (machine): 20  TV (machine): 400  FiO2: 50  PEEP: 12  ITime: 1  MAP: 20  PIP: 39                                        ABG - ( 02 Apr 2020 03:22 )  pH, Arterial: 7.48  pH, Blood: x     /  pCO2: 48    /  pO2: 77    / HCO3: 36    / Base Excess: 10.9  /  SaO2: 96        7.31/ 70/97/35 LA1.7       MEDICATIONS  (STANDING):  aMIOdarone    Tablet 200 milliGRAM(s) Oral two times a day  chlorhexidine 0.12% Liquid 15 milliLiter(s) Oral Mucosa two times a day  chlorhexidine 4% Liquid 1 Application(s) Topical <User Schedule>  enoxaparin Injectable 65 milliGRAM(s) SubCutaneous every 12 hours  morphine  Infusion 2 mG/Hr (2 mL/Hr) IV Continuous <Continuous>  pantoprazole   Suspension 40 milliGRAM(s) Oral daily  polyethylene glycol 3350 17 Gram(s) Oral daily  propofol Infusion 10 MICROgram(s)/kG/Min (3.93 mL/Hr) IV Continuous <Continuous>  senna 2 Tablet(s) Oral at bedtime  thiamine 100 milliGRAM(s) Oral daily    MEDICATIONS  (PRN):  acetaminophen   Tablet .. 650 milliGRAM(s) Oral every 6 hours PRN Temp greater or equal to 38C (100.4F)      New X-rays reviewed:                                                                                  ECHO    CXR interpreted by me:  ETT ok; Left TLC ok; Bilateral opacities

## 2020-04-03 NOTE — CHART NOTE - NSCHARTNOTEFT_GEN_A_CORE
Spoke to Paulo Singh - son  On [phone]  Updated on plan. Informed that patient is same as yesterday

## 2020-04-03 NOTE — PROGRESS NOTE ADULT - SUBJECTIVE AND OBJECTIVE BOX
CLIFTON LYNN 70y Female  MRN#: [id number]       SUBJECTIVE  Patient is a 70y old Female who presents with a chief complaint of covid r/o given fevers, non productive cough (03 Apr 2020 07:54)  Currently admitted to medicine with the primary diagnosis of Sepsis    Today is hospital day 12d, and this morning she is intubated, sedated.     OBJECTIVE  PAST MEDICAL & SURGICAL HISTORY  Afib    ALLERGIES:  Originally Entered as [Unknown] reaction to [green pepper] (Unknown)  Originally Entered as [Unknown] reaction to [pepper] (Unknown)  sulfa drugs (Unknown)    MEDICATIONS:  STANDING MEDICATIONS  ALBUTerol   0.5% 2.5 milliGRAM(s) Nebulizer two times a day  aMIOdarone    Tablet 200 milliGRAM(s) Oral two times a day  chlorhexidine 0.12% Liquid 15 milliLiter(s) Oral Mucosa two times a day  chlorhexidine 4% Liquid 1 Application(s) Topical <User Schedule>  enoxaparin Injectable 65 milliGRAM(s) SubCutaneous every 12 hours  morphine  Infusion 2 mG/Hr IV Continuous <Continuous>  norepinephrine Infusion 0.05 MICROgram(s)/kG/Min IV Continuous <Continuous>  pantoprazole   Suspension 40 milliGRAM(s) Oral daily  polyethylene glycol 3350 17 Gram(s) Oral daily  propofol Infusion 10 MICROgram(s)/kG/Min IV Continuous <Continuous>  senna 2 Tablet(s) Oral at bedtime  sodium chloride 3%  Inhalation 3 milliLiter(s) Inhalation every 12 hours  thiamine 100 milliGRAM(s) Oral daily    PRN MEDICATIONS  acetaminophen   Tablet .. 650 milliGRAM(s) Oral every 6 hours PRN      VITAL SIGNS: Last 24 Hours  T(C): 36.1 (03 Apr 2020 06:00), Max: 37.7 (02 Apr 2020 17:00)  T(F): 97 (03 Apr 2020 06:00), Max: 99.9 (02 Apr 2020 17:00)  HR: 60 (03 Apr 2020 09:00) (60 - 108)  BP: 113/53 (03 Apr 2020 09:00) (66/36 - 170/73)  BP(mean): 72 (03 Apr 2020 09:00) (46 - 113)  RR: 19 (03 Apr 2020 09:00) (12 - 23)  SpO2: 97% (03 Apr 2020 09:00) (93% - 100%)    LABS:                        11.2   11.34 )-----------( 297      ( 03 Apr 2020 05:15 )             35.9     04-03    142  |  100  |  23<H>  ----------------------------<  217<H>  4.3   |  31  |  0.5<L>    Ca    8.1<L>      03 Apr 2020 05:15  Mg     2.5     04-02    TPro  5.2<L>  /  Alb  2.7<L>  /  TBili  0.8  /  DBili  x   /  AST  92<H>  /  ALT  211<H>  /  AlkPhos  205<H>  04-03      ABG - ( 02 Apr 2020 03:22 )  pH, Arterial: 7.48  pH, Blood: x     /  pCO2: 48    /  pO2: 77    / HCO3: 36    / Base Excess: 10.9  /  SaO2: 96        Culture - Blood (collected 01 Apr 2020 12:11)  Source: .Blood None  Preliminary Report (02 Apr 2020 22:01):    No growth to date.      PHYSICAL EXAM:      GENERAL: intubated/sedated, does not withdraw from pain  HEENT:  Atraumatic, Normocephalic. EOMI, PERRLA, conjunctiva and sclera clear, No JVD  PULMONARY: Clear to auscultation bilaterally; No wheeze  CARDIOVASCULAR: Regular rate and rhythm; No murmurs, rubs, or gallops  GASTROINTESTINAL: Soft, Nontender, Nondistended; Bowel sounds present  MUSCULOSKELETAL:  2+ Peripheral Pulses, No clubbing, cyanosis, mild BUE edema  NEUROLOGY: non-focal  SKIN: No rash    ASSESSMENT & PLAN    69 y/o female with pmh of a-fib, c/o fever, non-productive cough, body aches and decreased appetite x 7 days. No known sick contacts. No diarrhea. No ABD pain.   Patient was found to be covid +  She required intubated on 3/24/2020 for hypoxic respiratory failure.     # hypoxic respiratory failure due to COVID -19 +, requiring intubation on 3/24  CXR: bilateral reticular infiltrates - improving on the right side  off pressors now  o2 requirements (now fio2=40%, PEEP=12.5%  Completed course of UNSYN for possible aspiration PNA  Completed course of Plaquenil, completed course of solumedrol  - s/p 1 dose of tocilizumab  - will start 3% nebulized saline to soften secretions  - will decrease sedation as tolerated  - patient requiring low dose pressors for low blood pressure, likely due to sedation     # high lactate --> resolved  - CT abdomen negative for mesenteric ischemia.     # Chronic A.fib with high rate A.fib  - resolved  - amiodarone   - Lovenox therapeutic     Diet: tube feeds  Lines: left IJ  Garcia present  DVT prophylaxis: Lovenox therapeutic  Ambulation: bedrest   Drips: propofol and morphine

## 2020-04-03 NOTE — PROGRESS NOTE ADULT - SUBJECTIVE AND OBJECTIVE BOX
CLIFTON LYNN  70y, Female    All available historical data reviewed    OVERNIGHT EVENTS:    fio2 50%  off pressors  ROS:  unable to obtain history secondary to patient's mental status and/or sedation     VITALS:  T(F): 97, Max: 99.9 (04-02-20 @ 17:00)  HR: 60  BP: 118/48  RR: 19Vital Signs Last 24 Hrs  T(C): 36.1 (03 Apr 2020 06:00), Max: 37.7 (02 Apr 2020 17:00)  T(F): 97 (03 Apr 2020 06:00), Max: 99.9 (02 Apr 2020 17:00)  HR: 60 (03 Apr 2020 11:00) (60 - 108)  BP: 118/48 (03 Apr 2020 11:00) (66/36 - 170/73)  BP(mean): 73 (03 Apr 2020 11:00) (46 - 113)  RR: 19 (03 Apr 2020 11:00) (12 - 23)  SpO2: 100% (03 Apr 2020 11:00) (94% - 100%)    TESTS & MEASUREMENTS:                        11.2   11.34 )-----------( 297      ( 03 Apr 2020 05:15 )             35.9     04-03    142  |  100  |  23<H>  ----------------------------<  217<H>  4.3   |  31  |  0.5<L>    Ca    8.1<L>      03 Apr 2020 05:15  Mg     2.5     04-02    TPro  5.2<L>  /  Alb  2.7<L>  /  TBili  0.8  /  DBili  x   /  AST  92<H>  /  ALT  211<H>  /  AlkPhos  205<H>  04-03    LIVER FUNCTIONS - ( 03 Apr 2020 05:15 )  Alb: 2.7 g/dL / Pro: 5.2 g/dL / ALK PHOS: 205 U/L / ALT: 211 U/L / AST: 92 U/L / GGT: x             Culture - Blood (collected 04-01-20 @ 12:11)  Source: .Blood None  Preliminary Report (04-02-20 @ 22:01):    No growth to date.    Culture - Blood (collected 03-28-20 @ 11:49)  Source: .Blood None  Gram Stain (03-30-20 @ 04:33):    Growth in aerobic bottle: Gram Positive Cocci in Clusters  Final Report (03-31-20 @ 09:43):    Growth in aerobic bottle: Staphylococcus epidermidis    Single set isolate, possible contaminant. Contact    Microbiology if susceptibility testing clinically    indicated.    ***Blood Panel PCR results on this specimen are available    approximately 3 hoursafter the Gram stain result.***    Gram stain, PCR, and/or culture results may not always    correspond due to difference in methodologies.    ************************************************************    This PCR assay was performed using [employer].    The following targets are tested for: Enterococcus,    vancomycin resistant enterococci, Listeria monocytogenes,    coagulase negative staphylococci, S. aureus,    methicillin resistant S. aureus, Streptococcus agalactiae    (Group B), S. pneumoniae, S. pyogenes (Group A),    Acinetobacter baumannii, Enterobacter cloacae, E. coli,    Klebsiella oxytoca, K. pneumoniae, Proteus sp.,    Serratia marcescens, Haemophilus influenzae,    Neisseria meningitidis, Pseudomonas aeruginosa, Candida    albicans, C. glabrata,C krusei, C parapsilosis,    C. tropicalis and the KPC resistance gene.  Organism: Blood Culture PCR (03-31-20 @ 09:43)  Organism: Blood Culture PCR (03-31-20 @ 09:43)      -  Coagulase negative Staphylococcus: Detec      Method Type: PCR    Culture - Urine (collected 03-28-20 @ 08:19)  Source: .Urine Catheterized  Final Report (03-29-20 @ 16:11):    No growth            RADIOLOGY & ADDITIONAL TESTS:  Personal review of radiological diagnostics performed  Echo and EKG results noted when applicable.     MEDICATIONS:  acetaminophen   Tablet .. 650 milliGRAM(s) Oral every 6 hours PRN  ALBUTerol   0.5% 2.5 milliGRAM(s) Nebulizer two times a day  aMIOdarone    Tablet 200 milliGRAM(s) Oral two times a day  chlorhexidine 0.12% Liquid 15 milliLiter(s) Oral Mucosa two times a day  chlorhexidine 4% Liquid 1 Application(s) Topical <User Schedule>  enoxaparin Injectable 65 milliGRAM(s) SubCutaneous every 12 hours  morphine  Infusion 2 mG/Hr IV Continuous <Continuous>  norepinephrine Infusion 0.05 MICROgram(s)/kG/Min IV Continuous <Continuous>  pantoprazole   Suspension 40 milliGRAM(s) Oral daily  polyethylene glycol 3350 17 Gram(s) Oral daily  propofol Infusion 10 MICROgram(s)/kG/Min IV Continuous <Continuous>  senna 2 Tablet(s) Oral at bedtime  sodium chloride 3%  Inhalation 3 milliLiter(s) Inhalation every 12 hours  thiamine 100 milliGRAM(s) Oral daily      ANTIBIOTICS:

## 2020-04-03 NOTE — PROCEDURE NOTE - NSPROCDETAILS_GEN_ALL_CORE
guidewire recovered/ultrasound guidance
patient pre-oxygenated, tube inserted, placement confirmed
ultrasound guidance/sutured in place/Seldinger technique/all materials/supplies accounted for at end of procedure/location identified, draped/prepped, sterile technique used, needle inserted/introduced/connected to a pressurized flush line/positive blood return obtained via catheter

## 2020-04-03 NOTE — PROGRESS NOTE ADULT - ASSESSMENT
IMPRESSION:  Acute Hypoxemic Respiratory Failure 2/2 COVID 19  ARDS  Sepsis present on admission  Septic shock resolved   H/O Afib on Eliquis    PLAN:    CNS: Wean sedation as tolerated     HEENT: ET care ,oral care    PULMONARY:  HOB @ 45 degrees. Aspiration precautions. Vent changes as follows:  Wean O2.   Repeat ABG. NS nebs 3% q 12 in closed circuit      CARDIOVASCULAR: Avoid volume overload.  FU QTC.  Amiodarone oral.      GI: GI prophylaxis.  OG feeding, Bowel regimen.      RENAL:  Follow up lytes.  Correct as needed. free water decrease to q 8     INFECTIOUS DISEASE: Follow up cultures.      HEMATOLOGICAL:  DVT prophylaxis / Therapy.  On LMWH     ENDOCRINE:  Follow up FS.  Insulin protocol if needed.  D/c solumedrol     MUSCULOSKELETAL: Bedrest    Lines: TLC     Disposition: MICU monitoring     A-line

## 2020-04-03 NOTE — PROGRESS NOTE ADULT - ASSESSMENT
· Assessment		  69 y/o female with pmh of a-fib on Eliquis , c/o fever, non-productive cough, body aches and decreased appetite x 7 days. No known sick contacts. No diarrhea. No ABD pain. No H/A, no neck pain. No dysuria/frequency/urgency. Presented with sob  No hemoptysis.  Patient admitted to internal medicine service for acute hypoxemic respiratory failure 2/2 covid19 .Patient intubated in CEU     IMPRESSION:  #resolving Severe septic shock ( ( still with lactate acidemia 3.3  ) secondary to COVID19 with critical disease  -ARDS with FiO2 60 %  -s/p Toci 3/24  -presently off all ABx  -CXR progressively stable with bibasilar opacities  -procal 0.06 > 0.11> 0.09 which goes against a bacterial process  -lactate acidemia : secondary to ischemia? no melena/BRBPR  -no ARF  -ALT mild elevation  -BC 3/22 NG  -BCx 3/28 CoNS ( not a true pathogen )  -BCx 4/1 NG  -inflammatory markers increasing. Ferritin 1047  -CT abdomen 4/1 essentially NG    RECOMMENDATIONS:  -track lactate  -track Ferritin  decreasing  -prognosis is extremely poor given the cytokine storm

## 2020-04-04 LAB
ALBUMIN SERPL ELPH-MCNC: 2.7 G/DL — LOW (ref 3.5–5.2)
ALP SERPL-CCNC: 175 U/L — HIGH (ref 30–115)
ALT FLD-CCNC: 202 U/L — HIGH (ref 0–41)
ANION GAP SERPL CALC-SCNC: 12 MMOL/L — SIGNIFICANT CHANGE UP (ref 7–14)
AST SERPL-CCNC: 82 U/L — HIGH (ref 0–41)
BILIRUB SERPL-MCNC: 0.6 MG/DL — SIGNIFICANT CHANGE UP (ref 0.2–1.2)
BUN SERPL-MCNC: 21 MG/DL — HIGH (ref 10–20)
CALCIUM SERPL-MCNC: 8.1 MG/DL — LOW (ref 8.5–10.1)
CHLORIDE SERPL-SCNC: 101 MMOL/L — SIGNIFICANT CHANGE UP (ref 98–110)
CO2 SERPL-SCNC: 31 MMOL/L — SIGNIFICANT CHANGE UP (ref 17–32)
CREAT SERPL-MCNC: 0.5 MG/DL — LOW (ref 0.7–1.5)
GLUCOSE SERPL-MCNC: 124 MG/DL — HIGH (ref 70–99)
HCT VFR BLD CALC: 34.9 % — LOW (ref 37–47)
HGB BLD-MCNC: 10.9 G/DL — LOW (ref 12–16)
MCHC RBC-ENTMCNC: 28.5 PG — SIGNIFICANT CHANGE UP (ref 27–31)
MCHC RBC-ENTMCNC: 31.2 G/DL — LOW (ref 32–37)
MCV RBC AUTO: 91.4 FL — SIGNIFICANT CHANGE UP (ref 81–99)
NRBC # BLD: 0 /100 WBCS — SIGNIFICANT CHANGE UP (ref 0–0)
PLATELET # BLD AUTO: 280 K/UL — SIGNIFICANT CHANGE UP (ref 130–400)
POTASSIUM SERPL-MCNC: 4.2 MMOL/L — SIGNIFICANT CHANGE UP (ref 3.5–5)
POTASSIUM SERPL-SCNC: 4.2 MMOL/L — SIGNIFICANT CHANGE UP (ref 3.5–5)
PROT SERPL-MCNC: 5 G/DL — LOW (ref 6–8)
RBC # BLD: 3.82 M/UL — LOW (ref 4.2–5.4)
RBC # FLD: 14.7 % — HIGH (ref 11.5–14.5)
SODIUM SERPL-SCNC: 144 MMOL/L — SIGNIFICANT CHANGE UP (ref 135–146)
WBC # BLD: 9.24 K/UL — SIGNIFICANT CHANGE UP (ref 4.8–10.8)
WBC # FLD AUTO: 9.24 K/UL — SIGNIFICANT CHANGE UP (ref 4.8–10.8)

## 2020-04-04 PROCEDURE — 71045 X-RAY EXAM CHEST 1 VIEW: CPT | Mod: 26

## 2020-04-04 RX ORDER — SODIUM CHLORIDE 9 MG/ML
500 INJECTION, SOLUTION INTRAVENOUS
Refills: 0 | Status: DISCONTINUED | OUTPATIENT
Start: 2020-04-04 | End: 2020-04-05

## 2020-04-04 RX ADMIN — MORPHINE SULFATE 2 MG/HR: 50 CAPSULE, EXTENDED RELEASE ORAL at 22:44

## 2020-04-04 RX ADMIN — SODIUM CHLORIDE 3 MILLILITER(S): 9 INJECTION INTRAMUSCULAR; INTRAVENOUS; SUBCUTANEOUS at 20:14

## 2020-04-04 RX ADMIN — SENNA PLUS 2 TABLET(S): 8.6 TABLET ORAL at 22:18

## 2020-04-04 RX ADMIN — SODIUM CHLORIDE 3 MILLILITER(S): 9 INJECTION INTRAMUSCULAR; INTRAVENOUS; SUBCUTANEOUS at 07:34

## 2020-04-04 RX ADMIN — ENOXAPARIN SODIUM 65 MILLIGRAM(S): 100 INJECTION SUBCUTANEOUS at 18:26

## 2020-04-04 RX ADMIN — ALBUTEROL 2.5 MILLIGRAM(S): 90 AEROSOL, METERED ORAL at 20:13

## 2020-04-04 RX ADMIN — AMIODARONE HYDROCHLORIDE 200 MILLIGRAM(S): 400 TABLET ORAL at 04:18

## 2020-04-04 RX ADMIN — CHLORHEXIDINE GLUCONATE 15 MILLILITER(S): 213 SOLUTION TOPICAL at 18:27

## 2020-04-04 RX ADMIN — PROPOFOL 3.93 MICROGRAM(S)/KG/MIN: 10 INJECTION, EMULSION INTRAVENOUS at 22:44

## 2020-04-04 RX ADMIN — PROPOFOL 3.93 MICROGRAM(S)/KG/MIN: 10 INJECTION, EMULSION INTRAVENOUS at 18:26

## 2020-04-04 RX ADMIN — CHLORHEXIDINE GLUCONATE 1 APPLICATION(S): 213 SOLUTION TOPICAL at 04:20

## 2020-04-04 RX ADMIN — CHLORHEXIDINE GLUCONATE 15 MILLILITER(S): 213 SOLUTION TOPICAL at 04:19

## 2020-04-04 RX ADMIN — AMIODARONE HYDROCHLORIDE 200 MILLIGRAM(S): 400 TABLET ORAL at 18:26

## 2020-04-04 RX ADMIN — ALBUTEROL 2.5 MILLIGRAM(S): 90 AEROSOL, METERED ORAL at 07:34

## 2020-04-04 RX ADMIN — SODIUM CHLORIDE 50 MILLILITER(S): 9 INJECTION, SOLUTION INTRAVENOUS at 18:26

## 2020-04-04 RX ADMIN — ENOXAPARIN SODIUM 65 MILLIGRAM(S): 100 INJECTION SUBCUTANEOUS at 04:19

## 2020-04-04 NOTE — PROGRESS NOTE ADULT - ASSESSMENT
IMPRESSION:  Acute Hypoxemic Respiratory Failure 2/2 COVID 19  ARDS  Sepsis present on admission  Septic shock resolved   H/O Afib on Eliquis    PLAN:    CNS: SAT Assess MS      HEENT: ET care ,oral care    PULMONARY:  HOB @ 45 degrees. Aspiration precautions. Vent changes as follows:  O2 40.  PEEP 10.  Possible PEEP 8 later today.  Contineu Saline nebs       CARDIOVASCULAR: Avoid volume overload.  FU QTC.  Amiodarone oral.      GI: GI prophylaxis.  OG feeding, Bowel regimen.      RENAL:  Follow up lytes.  Correct as needed. free water decrease to q 12    INFECTIOUS DISEASE: Follow up cultures.      HEMATOLOGICAL:  DVT prophylaxis / Therapy.  On LMWH     ENDOCRINE:  Follow up FS.  Insulin protocol if needed.      MUSCULOSKELETAL: Bedrest    Lines: TLC     Disposition: MICU monitoring     A-line

## 2020-04-04 NOTE — PROGRESS NOTE ADULT - SUBJECTIVE AND OBJECTIVE BOX
CLIFTON LYNN 70y Female  MRN#: [id number]     SUBJECTIVE  Patient is a 70y old Female who presents with a chief complaint of covid r/o given fevers, non productive cough (04 Apr 2020 07:57)  Currently admitted to medicine with the primary diagnosis of Sepsis  Today is hospital day 13d, and this morning she is intubated, sedated, failed SBT    OBJECTIVE  PAST MEDICAL & SURGICAL HISTORY  Afib    ALLERGIES:  Originally Entered as [Unknown] reaction to [green pepper] (Unknown)  Originally Entered as [Unknown] reaction to [pepper] (Unknown)  sulfa drugs (Unknown)    MEDICATIONS:  STANDING MEDICATIONS  ALBUTerol   0.5% 2.5 milliGRAM(s) Nebulizer two times a day  aMIOdarone    Tablet 200 milliGRAM(s) Oral two times a day  chlorhexidine 0.12% Liquid 15 milliLiter(s) Oral Mucosa two times a day  chlorhexidine 4% Liquid 1 Application(s) Topical <User Schedule>  enoxaparin Injectable 65 milliGRAM(s) SubCutaneous every 12 hours  lactated ringers. 500 milliLiter(s) IV Continuous <Continuous>  morphine  Infusion 2 mG/Hr IV Continuous <Continuous>  norepinephrine Infusion 0.05 MICROgram(s)/kG/Min IV Continuous <Continuous>  pantoprazole   Suspension 40 milliGRAM(s) Oral daily  polyethylene glycol 3350 17 Gram(s) Oral daily  propofol Infusion 10 MICROgram(s)/kG/Min IV Continuous <Continuous>  senna 2 Tablet(s) Oral at bedtime  sodium chloride 3%  Inhalation 3 milliLiter(s) Inhalation every 12 hours  thiamine 100 milliGRAM(s) Oral daily    PRN MEDICATIONS  acetaminophen   Tablet .. 650 milliGRAM(s) Oral every 6 hours PRN      VITAL SIGNS: Last 24 Hours  T(C): 36.3 (04 Apr 2020 12:00), Max: 36.3 (04 Apr 2020 12:00)  T(F): 97.3 (04 Apr 2020 12:00), Max: 97.3 (04 Apr 2020 12:00)  HR: 80 (04 Apr 2020 19:00) (44 - 106)  BP: 156/73 (04 Apr 2020 19:00) (75/39 - 168/76)  BP(mean): 110 (04 Apr 2020 19:00) (51 - 110)  RR: 19 (04 Apr 2020 19:00) (10 - 27)  SpO2: 95% (04 Apr 2020 19:00) (95% - 100%)    LABS:                        10.9   9.24  )-----------( 280      ( 04 Apr 2020 05:21 )             34.9     04-04    144  |  101  |  21<H>  ----------------------------<  124<H>  4.2   |  31  |  0.5<L>    Ca    8.1<L>      04 Apr 2020 05:21    TPro  5.0<L>  /  Alb  2.7<L>  /  TBili  0.6  /  DBili  x   /  AST  82<H>  /  ALT  202<H>  /  AlkPhos  175<H>  04-04      ABG - ( 04 Apr 2020 02:59 )  pH, Arterial: 7.45  pH, Blood: x     /  pCO2: 54    /  pO2: 136   / HCO3: 37    / Base Excess: 11.0  /  SaO2: 99          PHYSICAL EXAM:  GENERAL: intubated/sedated, does not withdraw from pain  HEENT:  Atraumatic, Normocephalic. EOMI, PERRLA, conjunctiva and sclera clear, No JVD  PULMONARY: Clear to auscultation bilaterally; No wheeze  CARDIOVASCULAR: Regular rate and rhythm; No murmurs, rubs, or gallops  GASTROINTESTINAL: Soft, Nontender, Nondistended; Bowel sounds present  MUSCULOSKELETAL:  2+ Peripheral Pulses, No clubbing, cyanosis, mild BUE edema  NEUROLOGY: non-focal  SKIN: No rash    ASSESSMENT & PLAN    69 y/o female with pmh of a-fib, c/o fever, non-productive cough, body aches and decreased appetite x 7 days. No known sick contacts. No diarrhea. No ABD pain.   Patient was found to be covid +  She required intubated on 3/24/2020 for hypoxic respiratory failure.     # hypoxic respiratory failure due to COVID -19 +, requiring intubation on 3/24  CXR: bilateral reticular infiltrates - improving on the right side  Off pressors now  o2 requirements (now fio2=40%, PEEP=12.5%)  Completed course of UNSYN for possible aspiration PNA  Completed course of Plaquenil, completed course of solumedrol  S/p 1 dose of tocilizumab  - on 3% nebulized saline to soften secretions  - will decrease sedation as tolerated  - patient requiring low dose pressors for low blood pressure, likely due to sedation     # high lactate --> resolved  - CT abdomen negative for mesenteric ischemia.     # Chronic A.fib with high rate A.fib  - resolved  - amiodarone   - Lovenox therapeutic     Diet: tube feeds  Lines: left IJ  Garcia present  DVT prophylaxis: Lovenox therapeutic  Ambulation: bedrest   Drips: propofol and morphine CLIFTON LYNN 70y Female  MRN#: [id number]     SUBJECTIVE  Patient is a 70y old Female who presents with a chief complaint of covid r/o given fevers, non productive cough (04 Apr 2020 07:57)  Currently admitted to medicine with the primary diagnosis of Sepsis  Today is hospital day 13d, and this morning she is intubated, sedated, failed SBT    OBJECTIVE  PAST MEDICAL & SURGICAL HISTORY  Afib    ALLERGIES:  Originally Entered as [Unknown] reaction to [green pepper] (Unknown)  Originally Entered as [Unknown] reaction to [pepper] (Unknown)  sulfa drugs (Unknown)    MEDICATIONS:  STANDING MEDICATIONS  ALBUTerol   0.5% 2.5 milliGRAM(s) Nebulizer two times a day  aMIOdarone    Tablet 200 milliGRAM(s) Oral two times a day  chlorhexidine 0.12% Liquid 15 milliLiter(s) Oral Mucosa two times a day  chlorhexidine 4% Liquid 1 Application(s) Topical <User Schedule>  enoxaparin Injectable 65 milliGRAM(s) SubCutaneous every 12 hours  lactated ringers. 500 milliLiter(s) IV Continuous <Continuous>  morphine  Infusion 2 mG/Hr IV Continuous <Continuous>  norepinephrine Infusion 0.05 MICROgram(s)/kG/Min IV Continuous <Continuous>  pantoprazole   Suspension 40 milliGRAM(s) Oral daily  polyethylene glycol 3350 17 Gram(s) Oral daily  propofol Infusion 10 MICROgram(s)/kG/Min IV Continuous <Continuous>  senna 2 Tablet(s) Oral at bedtime  sodium chloride 3%  Inhalation 3 milliLiter(s) Inhalation every 12 hours  thiamine 100 milliGRAM(s) Oral daily    PRN MEDICATIONS  acetaminophen   Tablet .. 650 milliGRAM(s) Oral every 6 hours PRN      VITAL SIGNS: Last 24 Hours  T(C): 36.3 (04 Apr 2020 12:00), Max: 36.3 (04 Apr 2020 12:00)  T(F): 97.3 (04 Apr 2020 12:00), Max: 97.3 (04 Apr 2020 12:00)  HR: 80 (04 Apr 2020 19:00) (44 - 106)  BP: 156/73 (04 Apr 2020 19:00) (75/39 - 168/76)  BP(mean): 110 (04 Apr 2020 19:00) (51 - 110)  RR: 19 (04 Apr 2020 19:00) (10 - 27)  SpO2: 95% (04 Apr 2020 19:00) (95% - 100%)    LABS:                        10.9   9.24  )-----------( 280      ( 04 Apr 2020 05:21 )             34.9     04-04    144  |  101  |  21<H>  ----------------------------<  124<H>  4.2   |  31  |  0.5<L>    Ca    8.1<L>      04 Apr 2020 05:21    TPro  5.0<L>  /  Alb  2.7<L>  /  TBili  0.6  /  DBili  x   /  AST  82<H>  /  ALT  202<H>  /  AlkPhos  175<H>  04-04      ABG - ( 04 Apr 2020 02:59 )  pH, Arterial: 7.45  pH, Blood: x     /  pCO2: 54    /  pO2: 136   / HCO3: 37    / Base Excess: 11.0  /  SaO2: 99          PHYSICAL EXAM:  GENERAL: intubated/sedated, does not withdraw from pain  HEENT:  Atraumatic, Normocephalic. EOMI, PERRLA, conjunctiva and sclera clear, No JVD  PULMONARY: Clear to auscultation bilaterally; No wheeze  CARDIOVASCULAR: Regular rate and rhythm; No murmurs, rubs, or gallops  GASTROINTESTINAL: Soft, Nontender, Nondistended; Bowel sounds present  MUSCULOSKELETAL:  2+ Peripheral Pulses, No clubbing, cyanosis, mild BUE edema  NEUROLOGY: non-focal  SKIN: No rash    ASSESSMENT & PLAN    71 y/o female with pmh of a-fib, c/o fever, non-productive cough, body aches and decreased appetite x 7 days. No known sick contacts. No diarrhea. No ABD pain.   Patient was found to be covid +  She required intubated on 3/24/2020 for hypoxic respiratory failure.     # hypoxic respiratory failure due to COVID -19 +, requiring intubation on 3/24  CXR: bilateral reticular infiltrates - improving on the right side  Off pressors now  o2 requirements (now fio2=35%, PEEP=8%)  Completed course of UNSYN for possible aspiration PNA  Completed course of Plaquenil, completed course of solumedrol  S/p 1 dose of tocilizumab  - on 3% nebulized saline to soften secretions  - will decrease sedation as tolerated  - patient requiring low dose pressors for low blood pressure, likely due to sedation   - will do SBT on 4/5/2020    # high lactate --> resolved  - CT abdomen negative for mesenteric ischemia.     # Chronic A.fib with high rate A.fib  - resolved  - amiodarone   - Lovenox therapeutic     Diet: tube feeds  Lines: left IJ  Garcia present  DVT prophylaxis: Lovenox therapeutic  Ambulation: bedrest   Drips: propofol and morphine

## 2020-04-04 NOTE — CHART NOTE - NSCHARTNOTEFT_GEN_A_CORE
As part of the communication team, I spoke w son Paulo [phone] and provided him a daily update.     Of note, Jayde's  (Paulo's father) passed away a few days ago in [facility] from ARF & COVID-related complications.  Jayde does not know yet!

## 2020-04-04 NOTE — PROGRESS NOTE ADULT - SUBJECTIVE AND OBJECTIVE BOX
Patient is a 70y old  Female who presents with a chief complaint of covid r/o given fevers, non productive cough (03 Apr 2020 12:18)        Over Night Events:  on MV.  Off pressors.  Sedated         ROS:     All ROS are negative except HPI         PHYSICAL EXAM    ICU Vital Signs Last 24 Hrs  T(C): 35.7 (03 Apr 2020 20:00), Max: 35.7 (03 Apr 2020 20:00)  T(F): 96.3 (03 Apr 2020 20:00), Max: 96.3 (03 Apr 2020 20:00)  HR: 74 (04 Apr 2020 07:00) (50 - 90)  BP: 104/48 (04 Apr 2020 06:00) (66/36 - 159/67)  BP(mean): 63 (04 Apr 2020 06:00) (46 - 104)  ABP: 120/64 (04 Apr 2020 07:00) (90/46 - 174/74)  ABP(mean): 84 (04 Apr 2020 07:00) (58 - 100)  RR: 19 (04 Apr 2020 07:00) (8 - 30)  SpO2: 100% (04 Apr 2020 07:36) (94% - 100%)      CONSTITUTIONAL:   Ill appearing.  Well nourished.  NAD    ENT:   Airway patent,   Mouth with normal mucosa.   No thrush    EYES:   Pupils equal,   Round and reactive to light.    CARDIAC:   Normal rate,   Regular rhythm.    UE edema      Vascular:  Normal systolic impulse  No Carotid bruits    RESPIRATORY:   No wheezing  Bilateral BS  Normal chest expansion  Not tachypneic,  No use of accessory muscles    GASTROINTESTINAL:  Abdomen soft,   Non-tender,   No guarding,   + BS    GENITOURINARY  normal genitalia for sex  No edema    MUSCULOSKELETAL:   Range of motion is not limited,  No clubbing, cyanosis    NEUROLOGICAL:   Sedated   Withdraws to pain     SKIN:   Skin normal color for race,   Warm and dry and intact.   No evidence of rash.    PSYCHIATRIC:   Sedated   No apparent risk to self or others.    HEMATOLOGICAL:  No cervical  lymphadenopathy.  no inguinal lymphadenopathy      04-03-20 @ 07:01  -  04-04-20 @ 07:00  --------------------------------------------------------  IN:    morphine  Infusion: 58 mL    norepinephrine Infusion: 30.1 mL    propofol Infusion: 301 mL    Vital High Protein: 430 mL  Total IN: 819.1 mL    OUT:    Indwelling Catheter - Urethral: 1335 mL  Total OUT: 1335 mL    Total NET: -515.9 mL          LABS:                            10.9   9.24  )-----------( 280      ( 04 Apr 2020 05:21 )             34.9                                               04-04    144  |  101  |  21<H>  ----------------------------<  124<H>  4.2   |  31  |  0.5<L>    Ca    8.1<L>      04 Apr 2020 05:21    TPro  5.0<L>  /  Alb  2.7<L>  /  TBili  0.6  /  DBili  x   /  AST  82<H>  /  ALT  202<H>  /  AlkPhos  175<H>  04-04                                                                                           LIVER FUNCTIONS - ( 04 Apr 2020 05:21 )  Alb: 2.7 g/dL / Pro: 5.0 g/dL / ALK PHOS: 175 U/L / ALT: 202 U/L / AST: 82 U/L / GGT: x                                                  Culture - Blood (collected 01 Apr 2020 12:11)  Source: .Blood None  Preliminary Report (02 Apr 2020 22:01):    No growth to date.                                                   Mode: AC/ CMV (Assist Control/ Continuous Mandatory Ventilation)  RR (machine): 20  TV (machine): 400  FiO2: 50  PEEP: 12  ITime: 1  MAP: 19  PIP: 39                                      ABG - ( 04 Apr 2020 02:59 )  pH, Arterial: 7.45  pH, Blood: x     /  pCO2: 54    /  pO2: 136   / HCO3: 37    / Base Excess: 11.0  /  SaO2: 99                  MEDICATIONS  (STANDING):  ALBUTerol   0.5% 2.5 milliGRAM(s) Nebulizer two times a day  aMIOdarone    Tablet 200 milliGRAM(s) Oral two times a day  chlorhexidine 0.12% Liquid 15 milliLiter(s) Oral Mucosa two times a day  chlorhexidine 4% Liquid 1 Application(s) Topical <User Schedule>  enoxaparin Injectable 65 milliGRAM(s) SubCutaneous every 12 hours  morphine  Infusion 2 mG/Hr (2 mL/Hr) IV Continuous <Continuous>  norepinephrine Infusion 0.05 MICROgram(s)/kG/Min (3.07 mL/Hr) IV Continuous <Continuous>  pantoprazole   Suspension 40 milliGRAM(s) Oral daily  polyethylene glycol 3350 17 Gram(s) Oral daily  propofol Infusion 10 MICROgram(s)/kG/Min (3.93 mL/Hr) IV Continuous <Continuous>  senna 2 Tablet(s) Oral at bedtime  sodium chloride 3%  Inhalation 3 milliLiter(s) Inhalation every 12 hours  thiamine 100 milliGRAM(s) Oral daily    MEDICATIONS  (PRN):  acetaminophen   Tablet .. 650 milliGRAM(s) Oral every 6 hours PRN Temp greater or equal to 38C (100.4F)      New X-rays reviewed:                                                                                  ECHO    CXR interpreted by me:

## 2020-04-05 LAB
ALBUMIN SERPL ELPH-MCNC: 2.7 G/DL — LOW (ref 3.5–5.2)
ALP SERPL-CCNC: 166 U/L — HIGH (ref 30–115)
ALT FLD-CCNC: 194 U/L — HIGH (ref 0–41)
ANION GAP SERPL CALC-SCNC: 10 MMOL/L — SIGNIFICANT CHANGE UP (ref 7–14)
AST SERPL-CCNC: 80 U/L — HIGH (ref 0–41)
BASE EXCESS BLDA CALC-SCNC: 9.1 MMOL/L — HIGH (ref -2–2)
BASOPHILS # BLD AUTO: 0.02 K/UL — SIGNIFICANT CHANGE UP (ref 0–0.2)
BASOPHILS NFR BLD AUTO: 0.3 % — SIGNIFICANT CHANGE UP (ref 0–1)
BILIRUB SERPL-MCNC: 0.7 MG/DL — SIGNIFICANT CHANGE UP (ref 0.2–1.2)
BUN SERPL-MCNC: 17 MG/DL — SIGNIFICANT CHANGE UP (ref 10–20)
CALCIUM SERPL-MCNC: 8.6 MG/DL — SIGNIFICANT CHANGE UP (ref 8.5–10.1)
CHLORIDE SERPL-SCNC: 100 MMOL/L — SIGNIFICANT CHANGE UP (ref 98–110)
CO2 SERPL-SCNC: 31 MMOL/L — SIGNIFICANT CHANGE UP (ref 17–32)
CREAT SERPL-MCNC: 0.5 MG/DL — LOW (ref 0.7–1.5)
CULTURE RESULTS: SIGNIFICANT CHANGE UP
EOSINOPHIL # BLD AUTO: 0.06 K/UL — SIGNIFICANT CHANGE UP (ref 0–0.7)
EOSINOPHIL NFR BLD AUTO: 0.8 % — SIGNIFICANT CHANGE UP (ref 0–8)
GAS PNL BLDA: SIGNIFICANT CHANGE UP
GLUCOSE SERPL-MCNC: 114 MG/DL — HIGH (ref 70–99)
HCO3 BLDA-SCNC: 34 MMOL/L — HIGH (ref 21–29)
HCT VFR BLD CALC: 34.7 % — LOW (ref 37–47)
HGB BLD-MCNC: 11.1 G/DL — LOW (ref 12–16)
HOROWITZ INDEX BLDA+IHG-RTO: 35 — SIGNIFICANT CHANGE UP
IMM GRANULOCYTES NFR BLD AUTO: 2.4 % — HIGH (ref 0.1–0.3)
LYMPHOCYTES # BLD AUTO: 0.54 K/UL — LOW (ref 1.2–3.4)
LYMPHOCYTES # BLD AUTO: 6.9 % — LOW (ref 20.5–51.1)
MAGNESIUM SERPL-MCNC: 2.1 MG/DL — SIGNIFICANT CHANGE UP (ref 1.8–2.4)
MCHC RBC-ENTMCNC: 29.4 PG — SIGNIFICANT CHANGE UP (ref 27–31)
MCHC RBC-ENTMCNC: 32 G/DL — SIGNIFICANT CHANGE UP (ref 32–37)
MCV RBC AUTO: 92 FL — SIGNIFICANT CHANGE UP (ref 81–99)
MONOCYTES # BLD AUTO: 0.31 K/UL — SIGNIFICANT CHANGE UP (ref 0.1–0.6)
MONOCYTES NFR BLD AUTO: 4 % — SIGNIFICANT CHANGE UP (ref 1.7–9.3)
NEUTROPHILS # BLD AUTO: 6.68 K/UL — HIGH (ref 1.4–6.5)
NEUTROPHILS NFR BLD AUTO: 85.6 % — HIGH (ref 42.2–75.2)
NRBC # BLD: 0 /100 WBCS — SIGNIFICANT CHANGE UP (ref 0–0)
PCO2 BLDA: 48 MMHG — HIGH (ref 38–42)
PH BLDA: 7.46 — HIGH (ref 7.38–7.42)
PLATELET # BLD AUTO: 263 K/UL — SIGNIFICANT CHANGE UP (ref 130–400)
PO2 BLDA: 73 MMHG — LOW (ref 78–95)
POTASSIUM SERPL-MCNC: 3.9 MMOL/L — SIGNIFICANT CHANGE UP (ref 3.5–5)
POTASSIUM SERPL-SCNC: 3.9 MMOL/L — SIGNIFICANT CHANGE UP (ref 3.5–5)
PROT SERPL-MCNC: 5.1 G/DL — LOW (ref 6–8)
RBC # BLD: 3.77 M/UL — LOW (ref 4.2–5.4)
RBC # FLD: 14.8 % — HIGH (ref 11.5–14.5)
SAO2 % BLDA: 95 % — SIGNIFICANT CHANGE UP (ref 92–96)
SODIUM SERPL-SCNC: 141 MMOL/L — SIGNIFICANT CHANGE UP (ref 135–146)
SPECIMEN SOURCE: SIGNIFICANT CHANGE UP
WBC # BLD: 7.8 K/UL — SIGNIFICANT CHANGE UP (ref 4.8–10.8)
WBC # FLD AUTO: 7.8 K/UL — SIGNIFICANT CHANGE UP (ref 4.8–10.8)

## 2020-04-05 PROCEDURE — 71045 X-RAY EXAM CHEST 1 VIEW: CPT | Mod: 26

## 2020-04-05 RX ORDER — LABETALOL HCL 100 MG
20 TABLET ORAL ONCE
Refills: 0 | Status: COMPLETED | OUTPATIENT
Start: 2020-04-05 | End: 2020-04-05

## 2020-04-05 RX ORDER — METOCLOPRAMIDE HCL 10 MG
5 TABLET ORAL ONCE
Refills: 0 | Status: DISCONTINUED | OUTPATIENT
Start: 2020-04-05 | End: 2020-04-05

## 2020-04-05 RX ORDER — AMIODARONE HYDROCHLORIDE 400 MG/1
100 TABLET ORAL DAILY
Refills: 0 | Status: DISCONTINUED | OUTPATIENT
Start: 2020-04-05 | End: 2020-04-16

## 2020-04-05 RX ORDER — METOCLOPRAMIDE HCL 10 MG
5 TABLET ORAL ONCE
Refills: 0 | Status: COMPLETED | OUTPATIENT
Start: 2020-04-05 | End: 2020-04-05

## 2020-04-05 RX ORDER — AMLODIPINE BESYLATE 2.5 MG/1
5 TABLET ORAL DAILY
Refills: 0 | Status: DISCONTINUED | OUTPATIENT
Start: 2020-04-05 | End: 2020-04-14

## 2020-04-05 RX ADMIN — CHLORHEXIDINE GLUCONATE 15 MILLILITER(S): 213 SOLUTION TOPICAL at 17:39

## 2020-04-05 RX ADMIN — AMIODARONE HYDROCHLORIDE 200 MILLIGRAM(S): 400 TABLET ORAL at 04:03

## 2020-04-05 RX ADMIN — AMLODIPINE BESYLATE 5 MILLIGRAM(S): 2.5 TABLET ORAL at 09:08

## 2020-04-05 RX ADMIN — Medication 20 MILLIGRAM(S): at 11:23

## 2020-04-05 RX ADMIN — POLYETHYLENE GLYCOL 3350 17 GRAM(S): 17 POWDER, FOR SOLUTION ORAL at 11:45

## 2020-04-05 RX ADMIN — ENOXAPARIN SODIUM 65 MILLIGRAM(S): 100 INJECTION SUBCUTANEOUS at 04:03

## 2020-04-05 RX ADMIN — PANTOPRAZOLE SODIUM 40 MILLIGRAM(S): 20 TABLET, DELAYED RELEASE ORAL at 11:45

## 2020-04-05 RX ADMIN — PROPOFOL 3.93 MICROGRAM(S)/KG/MIN: 10 INJECTION, EMULSION INTRAVENOUS at 04:04

## 2020-04-05 RX ADMIN — ALBUTEROL 2.5 MILLIGRAM(S): 90 AEROSOL, METERED ORAL at 07:49

## 2020-04-05 RX ADMIN — Medication 100 MILLIGRAM(S): at 11:44

## 2020-04-05 RX ADMIN — ENOXAPARIN SODIUM 65 MILLIGRAM(S): 100 INJECTION SUBCUTANEOUS at 17:40

## 2020-04-05 RX ADMIN — Medication 5 MILLIGRAM(S): at 15:17

## 2020-04-05 RX ADMIN — ALBUTEROL 2.5 MILLIGRAM(S): 90 AEROSOL, METERED ORAL at 20:50

## 2020-04-05 RX ADMIN — SODIUM CHLORIDE 3 MILLILITER(S): 9 INJECTION INTRAMUSCULAR; INTRAVENOUS; SUBCUTANEOUS at 07:49

## 2020-04-05 RX ADMIN — Medication 5 MILLIGRAM(S): at 17:03

## 2020-04-05 RX ADMIN — AMIODARONE HYDROCHLORIDE 100 MILLIGRAM(S): 400 TABLET ORAL at 09:10

## 2020-04-05 NOTE — PROGRESS NOTE ADULT - ASSESSMENT
IMPRESSION:  Acute Hypoxemic Respiratory Failure 2/2 COVID 19  ARDS  Sepsis present on admission  Septic shock resolved   H/O Afib on Eliquis    PLAN:    CNS: SAT     HEENT: ET care ,oral care    PULMONARY:  HOB @ 45 degrees. Aspiration precautions. Vent changes as follows:  O2 40.  PEEP 8.  SBT when awake.  Continue Saline nebs       CARDIOVASCULAR: Avoid volume overload.  FU QTC.  Amiodarone 100.  Norvasc 5     GI: GI prophylaxis. Hold  OG feeding, Bowel regimen.      RENAL:  Follow up lytes.  Correct as needed. DC free water    INFECTIOUS DISEASE: Follow up cultures.      HEMATOLOGICAL:  DVT prophylaxis / Therapy.  On LMWH     ENDOCRINE:  Follow up FS.  Insulin protocol if needed.      MUSCULOSKELETAL: Bedrest    Lines: TLC     Disposition: MICU monitoring     A-line

## 2020-04-05 NOTE — PROGRESS NOTE ADULT - SUBJECTIVE AND OBJECTIVE BOX
LEAHCLIFTON  70y, Female  Allergy: Originally Entered as [Unknown] reaction to [green pepper] (Unknown)  Originally Entered as [Unknown] reaction to [pepper] (Unknown)  sulfa drugs (Unknown)      CHIEF COMPLAINT: covid r/o given fevers, non productive cough (05 Apr 2020 08:17)      INTERVAL EVENTS/HPI  - No acute events overnight  - T(F): , Max: 98 (04-04-20 @ 20:00)  - Denies any worsening symptoms  - Tolerating medication  - WBC Count: 7.80 K/uL (04-05-20 @ 05:21)      ROS  unable to obtain history secondary to patient's mental status and/or sedation     FH non-contributory   Social Hx non-contributory    VITALS:  T(F): 97, Max: 98 (04-04-20 @ 20:00)  HR: 68  BP: 144/70  RR: 14Vital Signs Last 24 Hrs  T(C): 36.1 (05 Apr 2020 08:00), Max: 36.7 (04 Apr 2020 20:00)  T(F): 97 (05 Apr 2020 08:00), Max: 98 (04 Apr 2020 20:00)  HR: 68 (05 Apr 2020 10:00) (58 - 84)  BP: 144/70 (04 Apr 2020 20:00) (109/45 - 156/73)  BP(mean): 96 (04 Apr 2020 20:00) (65 - 110)  RR: 14 (05 Apr 2020 10:00) (6 - 20)  SpO2: 98% (05 Apr 2020 10:00) (94% - 100%)    PHYSICAL EXAM:  see below       TESTS & MEASUREMENTS:                        11.1   7.80  )-----------( 263      ( 05 Apr 2020 05:21 )             34.7     04-05    141  |  100  |  17  ----------------------------<  114<H>  3.9   |  31  |  0.5<L>    Ca    8.6      05 Apr 2020 05:21  Mg     2.1     04-05    TPro  5.1<L>  /  Alb  2.7<L>  /  TBili  0.7  /  DBili  x   /  AST  80<H>  /  ALT  194<H>  /  AlkPhos  166<H>  04-05    eGFR if Non African American: 97 mL/min/1.73M2 (04-05-20 @ 05:21)  eGFR if African American: 112 mL/min/1.73M2 (04-05-20 @ 05:21)    LIVER FUNCTIONS - ( 05 Apr 2020 05:21 )  Alb: 2.7 g/dL / Pro: 5.1 g/dL / ALK PHOS: 166 U/L / ALT: 194 U/L / AST: 80 U/L / GGT: x               Culture - Blood (collected 04-01-20 @ 12:11)  Source: .Blood None  Preliminary Report (04-02-20 @ 22:01):    No growth to date.        Lactate, Blood: 5.4 mmol/L (03-31-20 @ 17:26)      INFECTIOUS DISEASES TESTING  HIV-1/2 Combo Result: Nonreact (03-31-20 @ 04:45)  HIV-1/2 Combo Result: Nonreact (03-28-20 @ 20:50)  HIV-1/2 Combo Result: Nonreact (03-26-20 @ 05:00)  MRSA PCR Result.: Negative (03-25-20 @ 16:00)  Hepatitis C Virus Interpretation: Nonreact (03-23-20 @ 08:12)      RADIOLOGY & ADDITIONAL TESTS:  I have personally reviewed the last Chest xray  CXR  Xray Chest 1 View- PORTABLE-Urgent:   EXAM:  XR CHEST PORTABLE URGENT 1V            PROCEDURE DATE:  04/02/2020            INTERPRETATION:  Clinical History / Reason for exam: et tube advanced    Comparison : Chest radiograph April 2, 2020    Technique/Positioning: Frontal view of the chest    Findings:    Support devices: Telemetry leads. There is an endotracheal tube in appropriate position. There is an enteric tube coursing below the diaphragm. There is a left internal jugular central venous catheter projecting over the SVC.    Cardiac/mediastinum/hilum: Stable.    Lung parenchyma/Pleura: There is stable bilateral opacities, left greater than right.    Skeleton/soft tissues: Stable    Impression:      Stable bilateral opacities, left greater than right.    Lines and support devices as described above.                      JENNIFER ALICIA M.D., ATTENDING RADIOLOGIST  This document has been electronically signed. Apr 2 2020  2:45PM             (04-02-20 @ 14:26)      CT      CARDIOLOGY TESTING  12 Lead ECG:   Ventricular Rate 115 BPM    Atrial Rate 115 BPM    P-R Interval 125 ms    QRS Duration 88 ms    Q-T Interval 304 ms    QTC Calculation(Bezet) 420 ms    P Axis 79 degrees    R Axis 72 degrees    T Axis 43 degrees    Diagnosis Line Sinus tachycardia  Left atrial enlargement  Borderline ECG    Confirmed by YUSRA FIERRO MD ([id number]) on 3/31/2020 3:02:50 PM (03-31-20 @ 14:48)  Transthoracic Echocardiogram:    EXAM:  2-D ECHO (TTE) COMPLETE        PROCEDURE DATE:  03/30/2020      INTERPRETATION:  REPORT:    TRANSTHORACIC ECHOCARDIOGRAM REPORT         Patient Name:   CLIFTON LYNN Accession #: [id number]  Medical Rec #:  [id number]      Height:      64.0 in 162.6 cm  YOB: 1950      Weight:      144.0 lb 65.32 kg  Patient Age:    70 years      BSA:         1.70 m²  Patient Gender: F             BP:          125/68 mmHg       Date of Exam:        3/30/2020 11:05:33 AM  Referring Physician: [id number] ED UNASSIGNED  Sonographer:         Sandee Kline  Fellow:              Devon Marin     Reading Physician:   Yusra Fierro MD.    Procedure:     Follow up or Limited Echocardiogram.  Indications:   R57.9 - Shock, unspecified  Diagnosis:     Shock, unspecified - R57.9  Study Details: Limited.         Summary:   1. Left ventricular ejection fraction, by visual estimation, is 60 to 65%.   2. Normal left ventricular size and wall thicknesses, with normal systolic and diastolic function.    PHYSICIAN INTERPRETATION:  Left Ventricle: Normal left ventricular size and wall thicknesses, with normal systolic and diastolic function. The left ventricle was not well visualized. Left ventricular ejection fraction, by visual estimation, is60 to 65%.       LV Wall Scoring:  All segments are normal.    Right Ventricle: Normal right ventricular size and function. The right ventricle was not well visualized.  Left Atrium: Normal left atrial size.  Right Atrium: Normal right atrial size.  Pericardium: There is no evidence of pericardial effusion.  Mitral Valve: Structurally normal mitral valve, with normal leaflet excursion. The mitral valve is not well seen.  Tricuspid Valve: Structurally normal tricuspid valve, with normal leaflet excursion. The tricuspid valve is not well seen. The tricuspid valve is normal in structure. Mild tricuspid regurgitation is visualized.  Aortic Valve: Normal trileaflet aortic valve with normal opening. The aortic valve was not well visualized.  Pulmonic Valve: The pulmonic valve was not well visualized.  Aorta: The aortic root and ascending aorta are structurally normal, with no evidence of dilitation.  Pulmonary Artery: The pulmonary artery is not well seen.  Venous: The inferior vena cava was normal sized, with respiratory size variation greater than 50%.       2D AND M-MODE MEASUREMENTS (normal ranges within parentheses):  Left                  Normal   Aorta/Left             Normal  Ventricle:                     Atrium:  IVSd        1.06 cm  (0.7-1.1) Left Atrium    2.48  (1.9-4.0)  (Mmode):                       (Mmode):        cm  LVPWd       0.98 cm  (0.7-1.1)  (Mmode):  LVIDd       4.11 cm  (3.4-5.7)  (Mmode):  LVIDs       2.90 cm  (Mmode):  LV FS       29.5 %    (>25%)  (Mmode):  Rel. Wall    0.48     (<0.42)  Thickness  Mm  LV Mass    79.8 g/m²  Index:  Mmode    SPECTRAL DOPPLER ANALYSIS:  Tricuspid Valve and PA/RV Systolic Pressure: TR Max Velocity: 2.77 m/s RA Pressure: 3 mmHg RVSP/PASP: 33.7 mmHg       [id number] Yusra Fierro MD, Electronically signed on 3/30/2020 at 2:18:19 PM         *** Final ***                    YUSRA FIERRO MD  This document has been electronically signed. Mar 30 2020 11:05AM             (03-30-20 @ 11:05)      MEDICATIONS  ALBUTerol   0.5% 2.5  aMIOdarone    Tablet 100  amLODIPine   Tablet 5  chlorhexidine 0.12% Liquid 15  chlorhexidine 4% Liquid 1  enoxaparin Injectable 65  morphine  Infusion 2  norepinephrine Infusion 0.05  pantoprazole   Suspension 40  polyethylene glycol 3350 17  propofol Infusion 10  senna 2  sodium chloride 3%  Inhalation 3  thiamine 100      ANTIBIOTICS:      All available historical data has been reviewed

## 2020-04-05 NOTE — PROGRESS NOTE ADULT - SUBJECTIVE AND OBJECTIVE BOX
Patient is a 70y old  Female who presents with a chief complaint of covid r/o given fevers, non productive cough (04 Apr 2020 21:36)        Over Night Events: Remains on MV>  Off pressors.  Sedated         ROS:     All ROS are negative except HPI         PHYSICAL EXAM    ICU Vital Signs Last 24 Hrs  T(C): 36.7 (05 Apr 2020 00:00), Max: 36.7 (04 Apr 2020 20:00)  T(F): 98 (05 Apr 2020 00:00), Max: 98 (04 Apr 2020 20:00)  HR: 64 (05 Apr 2020 06:00) (44 - 106)  BP: 144/70 (04 Apr 2020 20:00) (76/41 - 168/76)  BP(mean): 96 (04 Apr 2020 20:00) (52 - 110)  ABP: 144/60 (05 Apr 2020 06:00) (76/44 - 188/86)  ABP(mean): 86 (05 Apr 2020 06:00) (56 - 124)  RR: 15 (05 Apr 2020 06:00) (6 - 27)  SpO2: 99% (05 Apr 2020 06:00) (94% - 100%)      CONSTITUTIONAL:   Ill appearing.  Well nourished.  NAD    ENT:   Airway patent,   Mouth with normal mucosa.   No thrush    EYES:   Pupils equal,   Round and reactive to light.    CARDIAC:   Normal rate,   Regular rhythm.    No edema      Vascular:  Normal systolic impulse  No Carotid bruits    RESPIRATORY:   No wheezing  Bilateral BS  Normal chest expansion  Not tachypneic,  No use of accessory muscles    GASTROINTESTINAL:  Abdomen soft,   Non-tender,   No guarding,   + BS    GENITOURINARY  normal genitalia for sex  no edema    MUSCULOSKELETAL:   Range of motion is not limited,  No clubbing, cyanosis    NEUROLOGICAL:   Sedated     SKIN:   Skin normal color for race,   Warm and dry and intact.   No evidence of rash.    PSYCHIATRIC:   Sedated   No apparent risk to self or others.    HEMATOLOGICAL:  No cervical  lymphadenopathy.  no inguinal lymphadenopathy      04-04-20 @ 07:01  -  04-05-20 @ 07:00  --------------------------------------------------------  IN:    lactated ringers.: 450 mL    morphine  Infusion: 35 mL    propofol Infusion: 234 mL  Total IN: 719 mL    OUT:    Indwelling Catheter - Urethral: 1600 mL  Total OUT: 1600 mL    Total NET: -881 mL          LABS:                            11.1   7.80  )-----------( 263      ( 05 Apr 2020 05:21 )             34.7                                               04-05    141  |  100  |  17  ----------------------------<  114<H>  3.9   |  31  |  0.5<L>    Ca    8.6      05 Apr 2020 05:21  Mg     2.1     04-05    TPro  5.1<L>  /  Alb  2.7<L>  /  TBili  0.7  /  DBili  x   /  AST  80<H>  /  ALT  194<H>  /  AlkPhos  166<H>  04-05                                                                                           LIVER FUNCTIONS - ( 05 Apr 2020 05:21 )  Alb: 2.7 g/dL / Pro: 5.1 g/dL / ALK PHOS: 166 U/L / ALT: 194 U/L / AST: 80 U/L / GGT: x                                                                                               Mode: AC/ CMV (Assist Control/ Continuous Mandatory Ventilation)  RR (machine): 15  TV (machine): 400  FiO2: 35  PEEP: 10  ITime: 1  MAP: 18  PIP: 41                                      ABG - ( 05 Apr 2020 04:01 )  pH, Arterial: 7.46  pH, Blood: x     /  pCO2: 48    /  pO2: 73    / HCO3: 34    / Base Excess: 9.1   /  SaO2: 95                  MEDICATIONS  (STANDING):  ALBUTerol   0.5% 2.5 milliGRAM(s) Nebulizer two times a day  aMIOdarone    Tablet 200 milliGRAM(s) Oral two times a day  chlorhexidine 0.12% Liquid 15 milliLiter(s) Oral Mucosa two times a day  chlorhexidine 4% Liquid 1 Application(s) Topical <User Schedule>  enoxaparin Injectable 65 milliGRAM(s) SubCutaneous every 12 hours  lactated ringers. 500 milliLiter(s) (50 mL/Hr) IV Continuous <Continuous>  morphine  Infusion 2 mG/Hr (2 mL/Hr) IV Continuous <Continuous>  norepinephrine Infusion 0.05 MICROgram(s)/kG/Min (3.07 mL/Hr) IV Continuous <Continuous>  pantoprazole   Suspension 40 milliGRAM(s) Oral daily  polyethylene glycol 3350 17 Gram(s) Oral daily  propofol Infusion 10 MICROgram(s)/kG/Min (3.93 mL/Hr) IV Continuous <Continuous>  senna 2 Tablet(s) Oral at bedtime  sodium chloride 3%  Inhalation 3 milliLiter(s) Inhalation every 12 hours  thiamine 100 milliGRAM(s) Oral daily    MEDICATIONS  (PRN):  acetaminophen   Tablet .. 650 milliGRAM(s) Oral every 6 hours PRN Temp greater or equal to 38C (100.4F)      New X-rays reviewed:                                                                                  ECHO    CXR interpreted by me:  ET OG OK>  Bilateral infiltrates

## 2020-04-05 NOTE — CHART NOTE - NSCHARTNOTEFT_GEN_A_CORE
As part of the communication team, I spoke w son Paulo [phone] and provided him a daily update.

## 2020-04-05 NOTE — PROGRESS NOTE ADULT - ASSESSMENT
71 y/o female with pmh of a-fib on Eliquis , c/o fever, non-productive cough, body aches and decreased appetite x 7 days. No known sick contacts. No diarrhea. No ABD pain. No H/A, no neck pain. No dysuria/frequency/urgency. Presented with sob  No hemoptysis.  Patient admitted to internal medicine service for acute hypoxemic respiratory failure 2/2 covid19 .Patient intubated in CEU     IMPRESSION:  #resolving Severe septic shock ( ( still with lactate acidemia 3.3  ) secondary to COVID19 with critical disease  -ARDS with FiO2 60 %  -s/p Toci 3/24  -presently off all ABx  -CXR progressively stable with bibasilar opacities  -procal 0.06 > 0.11> 0.09 which goes against a bacterial process  -lactate acidemia : secondary to ischemia? no melena/BRBPR  -no ARF  -ALT mild elevation  -BC 3/22 NG  -BCx 3/28 CoNS ( not a true pathogen )  -BCx 4/1 NG  -inflammatory markers increasing. Ferritin 1047  -CT abdomen 4/1 essentially NG  Lactate normalized    RECOMMENDATIONS:  Repeat Ferritin   -prognosis is extremely poor given the cytokine storm

## 2020-04-06 LAB
ALBUMIN SERPL ELPH-MCNC: 3 G/DL — LOW (ref 3.5–5.2)
ALP SERPL-CCNC: 187 U/L — HIGH (ref 30–115)
ALT FLD-CCNC: 178 U/L — HIGH (ref 0–41)
ANION GAP SERPL CALC-SCNC: 14 MMOL/L — SIGNIFICANT CHANGE UP (ref 7–14)
AST SERPL-CCNC: 72 U/L — HIGH (ref 0–41)
BASOPHILS # BLD AUTO: 0.02 K/UL — SIGNIFICANT CHANGE UP (ref 0–0.2)
BASOPHILS NFR BLD AUTO: 0.2 % — SIGNIFICANT CHANGE UP (ref 0–1)
BILIRUB SERPL-MCNC: 0.8 MG/DL — SIGNIFICANT CHANGE UP (ref 0.2–1.2)
BUN SERPL-MCNC: 13 MG/DL — SIGNIFICANT CHANGE UP (ref 10–20)
CALCIUM SERPL-MCNC: 8.7 MG/DL — SIGNIFICANT CHANGE UP (ref 8.5–10.1)
CHLORIDE SERPL-SCNC: 99 MMOL/L — SIGNIFICANT CHANGE UP (ref 98–110)
CO2 SERPL-SCNC: 28 MMOL/L — SIGNIFICANT CHANGE UP (ref 17–32)
CREAT SERPL-MCNC: 0.5 MG/DL — LOW (ref 0.7–1.5)
EOSINOPHIL # BLD AUTO: 0.04 K/UL — SIGNIFICANT CHANGE UP (ref 0–0.7)
EOSINOPHIL NFR BLD AUTO: 0.5 % — SIGNIFICANT CHANGE UP (ref 0–8)
GLUCOSE SERPL-MCNC: 108 MG/DL — HIGH (ref 70–99)
HCT VFR BLD CALC: 36.3 % — LOW (ref 37–47)
HGB BLD-MCNC: 12 G/DL — SIGNIFICANT CHANGE UP (ref 12–16)
IMM GRANULOCYTES NFR BLD AUTO: 1.9 % — HIGH (ref 0.1–0.3)
LYMPHOCYTES # BLD AUTO: 0.38 K/UL — LOW (ref 1.2–3.4)
LYMPHOCYTES # BLD AUTO: 4.4 % — LOW (ref 20.5–51.1)
MCHC RBC-ENTMCNC: 29.7 PG — SIGNIFICANT CHANGE UP (ref 27–31)
MCHC RBC-ENTMCNC: 33.1 G/DL — SIGNIFICANT CHANGE UP (ref 32–37)
MCV RBC AUTO: 89.9 FL — SIGNIFICANT CHANGE UP (ref 81–99)
MONOCYTES # BLD AUTO: 0.43 K/UL — SIGNIFICANT CHANGE UP (ref 0.1–0.6)
MONOCYTES NFR BLD AUTO: 5 % — SIGNIFICANT CHANGE UP (ref 1.7–9.3)
NEUTROPHILS # BLD AUTO: 7.53 K/UL — HIGH (ref 1.4–6.5)
NEUTROPHILS NFR BLD AUTO: 88 % — HIGH (ref 42.2–75.2)
NRBC # BLD: 0 /100 WBCS — SIGNIFICANT CHANGE UP (ref 0–0)
PLATELET # BLD AUTO: 292 K/UL — SIGNIFICANT CHANGE UP (ref 130–400)
POTASSIUM SERPL-MCNC: 3.7 MMOL/L — SIGNIFICANT CHANGE UP (ref 3.5–5)
POTASSIUM SERPL-SCNC: 3.7 MMOL/L — SIGNIFICANT CHANGE UP (ref 3.5–5)
PROCALCITONIN SERPL-MCNC: 0.04 NG/ML — SIGNIFICANT CHANGE UP (ref 0.02–0.1)
PROCALCITONIN SERPL-MCNC: 0.04 NG/ML — SIGNIFICANT CHANGE UP (ref 0.02–0.1)
PROT SERPL-MCNC: 5.4 G/DL — LOW (ref 6–8)
RBC # BLD: 4.04 M/UL — LOW (ref 4.2–5.4)
RBC # FLD: 14.8 % — HIGH (ref 11.5–14.5)
SODIUM SERPL-SCNC: 141 MMOL/L — SIGNIFICANT CHANGE UP (ref 135–146)
WBC # BLD: 8.56 K/UL — SIGNIFICANT CHANGE UP (ref 4.8–10.8)
WBC # FLD AUTO: 8.56 K/UL — SIGNIFICANT CHANGE UP (ref 4.8–10.8)

## 2020-04-06 PROCEDURE — 71045 X-RAY EXAM CHEST 1 VIEW: CPT | Mod: 26

## 2020-04-06 PROCEDURE — 93010 ELECTROCARDIOGRAM REPORT: CPT

## 2020-04-06 RX ORDER — ESMOLOL HCL 100MG/10ML
50 VIAL (ML) INTRAVENOUS
Qty: 2500 | Refills: 0 | Status: DISCONTINUED | OUTPATIENT
Start: 2020-04-06 | End: 2020-04-07

## 2020-04-06 RX ORDER — AMIODARONE HYDROCHLORIDE 400 MG/1
0.5 TABLET ORAL
Qty: 900 | Refills: 0 | Status: DISCONTINUED | OUTPATIENT
Start: 2020-04-06 | End: 2020-04-07

## 2020-04-06 RX ORDER — AMIODARONE HYDROCHLORIDE 400 MG/1
1 TABLET ORAL
Qty: 900 | Refills: 0 | Status: DISCONTINUED | OUTPATIENT
Start: 2020-04-06 | End: 2020-04-07

## 2020-04-06 RX ORDER — AMIODARONE HYDROCHLORIDE 400 MG/1
150 TABLET ORAL ONCE
Refills: 0 | Status: COMPLETED | OUTPATIENT
Start: 2020-04-06 | End: 2020-04-06

## 2020-04-06 RX ADMIN — ENOXAPARIN SODIUM 65 MILLIGRAM(S): 100 INJECTION SUBCUTANEOUS at 18:37

## 2020-04-06 RX ADMIN — AMIODARONE HYDROCHLORIDE 600 MILLIGRAM(S): 400 TABLET ORAL at 12:03

## 2020-04-06 NOTE — PROGRESS NOTE ADULT - ASSESSMENT
IMPRESSION:  Acute Hypoxemic Respiratory Failure 2/2 COVID 19, resolved   ARDS  Sepsis present on admission  Septic shock resolved   H/O Afib on Eliquis    PLAN:    CNS: No depressants     HEENT: ET care ,oral care    PULMONARY:  HOB @ 45 degrees. Aspiration precautions. Wean O2 as tolerated     CARDIOVASCULAR: Avoid volume overload.  FU QTC.  Continue Rate and BP control      GI: GI prophylaxis. Feeding per speech ands swallow, Bowel regimen.      RENAL:  Follow up lytes.  Correct as needed.    INFECTIOUS DISEASE: Follow up cultures.      HEMATOLOGICAL:  DVT prophylaxis / Therapy.  On LMWH     ENDOCRINE:  Follow up FS.  Insulin protocol if needed.      MUSCULOSKELETAL: Bedrest    Lines: TLC     Disposition: MICU monitoring IMPRESSION:  Acute Hypoxemic Respiratory Failure 2/2 COVID 19, resolved   ARDS  Sepsis present on admission  Septic shock resolved   H/O Afib on Eliquis    PLAN:    CNS: No depressants     HEENT: ET care ,oral care    PULMONARY:  HOB @ 45 degrees. Aspiration precautions. Wean O2 as tolerated     CARDIOVASCULAR: Avoid volume overload.  FU QTC.  Continue Rate and BP control      GI: GI prophylaxis. Feeding per speech ands swallow, Bowel regimen.      RENAL:  Follow up lytes.  Correct as needed.    INFECTIOUS DISEASE: Follow up cultures.      HEMATOLOGICAL:  DVT prophylaxis / Therapy.  On LMWH     ENDOCRINE:  Follow up FS.  Insulin protocol if needed.      MUSCULOSKELETAL: Bedrest    Lines: TLC     Disposition: MICU monitoring     DC candido and Davis

## 2020-04-06 NOTE — CHART NOTE - NSCHARTNOTEFT_GEN_A_CORE
As part of the communication team, I spoke w son Paulo [phone] and provided him a daily update.

## 2020-04-06 NOTE — PROGRESS NOTE ADULT - ASSESSMENT
· Assessment		  71 y/o female with pmh of a-fib on Eliquis , c/o fever, non-productive cough, body aches and decreased appetite x 7 days. No known sick contacts. No diarrhea. No ABD pain. No H/A, no neck pain. No dysuria/frequency/urgency. Presented with sob  No hemoptysis.  Patient admitted to internal medicine service for acute hypoxemic respiratory failure 2/2 covid19 .Patient intubated in CEU     IMPRESSION:  #resolved sepsis secondary to COVID19 with critical disease  -s/p extunbation  -s/p Toci 3/24  -presently off all ABx  -CXR stable with bibasilar opacities  -procal 0.06 > 0.11> 0.09 >0.05which goes against a bacterial process  -lactate acidemia : secondary to ischemia? no melena/BRBPR  -no ARF  -ALT mild elevation  -BC 3/22 NG  -BCx 3/28 CoNS ( not a true pathogen )  -BCx 4/1 NG  -inflammatory markers increasing. Ferritin 1047  -CT abdomen 4/1 essentially NG    RECOMMENDATIONS:  -supportive care off abx  recall prn please

## 2020-04-06 NOTE — CHART NOTE - NSCHARTNOTEFT_GEN_A_CORE
Registered Dietitian Follow-Up    ***Scroll to the bottom for RD recommendation***    Patient Profile Reviewed                           Yes [x]   No []  Nutrition History Previously Obtained        Yes []  No [x]          PERTINENT SUBJECTIVE INFORMATION (LATEST AS OF TODAY):  - pt is extubated, sleeping, unable to locate RN for updated status. Possible pending SLP?  - pt was with high lactate, not stable.      PERTINENT MEDICAL INFORMATIONS:  (1) Found with COVID19 - hypoxic resp failure due to COVID. Requiring intubation.  --- resolved.  (2) CXR b/l retincular infiltration. improving on R side.  (3) Off pressor.  (4) C/w Plaquenil course.  (5) high lactate- CT abd negative for mesenteric ischemia.  - stable?          DIET ORDER:  NPO (was on Osmolite 1.5)          ANTHROPOMETRICS:  - Ht.  160cm  - Wt.  (3/19): 67.3kg  (3/24): 65.5kg  (3/29): 67.3kg  (4/3): 67.7kg - weight back to baseline, will continue to monitor trend.  - BMI. 25.6  - IBW.        PERTINENT LAB DATA:  4/6: hematocrit 36.3, Cr 0.5, glucose 108, Albumin 3.0, LFT elevated  PERTINENT MEDS:   enoxaparin, amiodarone, b1, acetaminophen, Miralax, senna, protonix,      PHYSICAL FINDINGS  - APPEARANCE:        extubated, on bipap, 1+ generalized edema  - GI FUNCTION:        LBM remains unknown since admission  - TUBES:                       - ORAL/MOUTH:      NPO   - SKIN:                        Ecchymosis        NUTRITION REQUIREMENTS  WEIGHT USED:                          ABW is 67.3kg (from 3/19), Ht: 160cm  ESTIMATED ENERGY NEEDS:       CONTINUE [  ]      ADJUST [ x ]  - extubated 4/5    ESTIMATED ENERGY NEEDS:         4089-1987 kcal/day (MSJ of 1329 x 1.2-1.3)  ESTIMATED PROTEIN NEEDS:        67-78 g/day (1.0-1.2 g/kg of ABW)  ESTIMATED FLUID NEEDS:             fluid per MICU team    CURRENT NUTRIENT NEEDS:     NPO          [x  ] PREVIOUS NUTRITION DIAGNOSIS:   (1) inadequate protein-energy intake            [ x ] ONGOING        [  ] RESOLVED            PATIENT INTERVENTION:    [ x ] ORAL        [x] EN/TF     GOAL/EXPECTED OUTCOME:     pt to meet % of estimated kcal/protein via TF upon f/u in 3 days if pt to fail SLP. Pt to have 1BM/day. if pt to pass SLP, consume and tolerate >50% of all meals upon f/u in 3 days.   INDICATOR/MONITORING:       RD to monitor diet order, energy intake, body composition, nutrition focused physical findings (EN tolerance, , PO tolerance, electrolytes, BM)  NUTRITION INTERVENTION:        Enteral nutrition, coordination of care, meals and snacks      RECS: (1) If patient to pass SLP, order diet per SLP recs. (2) If failed SLP, and NG is place for Tube feed, may start with Jevity 1.2 at 220ml q4hr, or 330mL q6hr (depending on current [facility] care policy). This regimen at GOAL will give a total of 1567 kcal/ 73g protein/ 1069mL free water, additional flushes per MICU team.

## 2020-04-06 NOTE — PROGRESS NOTE ADULT - SUBJECTIVE AND OBJECTIVE BOX
CLIFTON LYNN 70y Female  MRN#: [id number]   CODE STATUS: FULL     SUBJECTIVE  Patient is a 70y old Female who presents with a chief complaint of covid r/o given fevers, non productive cough (06 Apr 2020 08:56)  Currently admitted to medicine with the primary diagnosis of Sepsis    Today is hospital day 15d, and this morning she is     Present Today:           Garcia Catheter ()No/ (X)Yes? Indication: will d/c today           Central Line ()No/ (x)Yes? Indication: will d/c today           IV Fluids (x)No/ ()Yes? Type:  Rate:  Indication:      OBJECTIVE  PAST MEDICAL & SURGICAL HISTORY  Afib    ALLERGIES:  Originally Entered as [Unknown] reaction to [green pepper] (Unknown)  Originally Entered as [Unknown] reaction to [pepper] (Unknown)  sulfa drugs (Unknown)    MEDICATIONS:  STANDING MEDICATIONS  aMIOdarone    Tablet 100 milliGRAM(s) Oral daily  amLODIPine   Tablet 5 milliGRAM(s) Oral daily  enoxaparin Injectable 65 milliGRAM(s) SubCutaneous every 12 hours  pantoprazole   Suspension 40 milliGRAM(s) Oral daily  polyethylene glycol 3350 17 Gram(s) Oral daily  senna 2 Tablet(s) Oral at bedtime  thiamine 100 milliGRAM(s) Oral daily    PRN MEDICATIONS  acetaminophen   Tablet .. 650 milliGRAM(s) Oral every 6 hours PRN      VITAL SIGNS: Last 24 Hours  T(C): 36.1 (06 Apr 2020 00:00), Max: 36.3 (05 Apr 2020 12:00)  T(F): 97 (06 Apr 2020 00:00), Max: 97.3 (05 Apr 2020 12:00)  HR: 88 (06 Apr 2020 06:00) (64 - 120)  BP: --  BP(mean): --  RR: 16 (06 Apr 2020 06:00) (11 - 28)  SpO2: 97% (06 Apr 2020 09:56) (94% - 99%)    LABS:                        12.0   8.56  )-----------( 292      ( 06 Apr 2020 05:45 )             36.3     04-06    141  |  99  |  13  ----------------------------<  108<H>  3.7   |  28  |  0.5<L>    Ca    8.7      06 Apr 2020 05:45  Mg     2.1     04-05    TPro  5.4<L>  /  Alb  3.0<L>  /  TBili  0.8  /  DBili  x   /  AST  72<H>  /  ALT  178<H>  /  AlkPhos  187<H>  04-06        ABG - ( 05 Apr 2020 09:29 )  pH, Arterial: 7.45  pH, Blood: x     /  pCO2: 47    /  pO2: 69    / HCO3: 32    / Base Excess: 6.5   /  SaO2: 94          RADIOLOGY:  none today     PHYSICAL EXAM:    GENERAL: awake but lethargic, not following commands   HEENT: conjunctiva and sclera clear  PULMONARY: Clear to auscultation bilaterally  CARDIOVASCULAR: Regular rate and rhythm  GASTROINTESTINAL: Soft, Nontender, Nondistended  MUSCULOSKELETAL:  2+ Peripheral Pulses, No clubbing, cyanosis, or edema  NEUROLOGY: non-focal  SKIN: No rashes     ADMISSION SUMMARY  Patient is a 70y old Female who presents with a chief complaint of covid r/o given fevers, non productive cough (06 Apr 2020 08:56)  Currently admitted to medicine with the primary diagnosis of Sepsis    ASSESSMENT & PLAN  71 y/o female with pmh of a-fib, c/o fever, non-productive cough, body aches and decreased appetite x 7 days. No known sick contacts. No diarrhea. No ABD pain.   Patient was found to be COVID +. She required intubated on 3/24/2020 for hypoxic respiratory failure. Extubated 4/5.     #Acute hypoxic respiratory failure due to COVID-19 +, requiring intubation on 3/24, extubated on 4/5   -CXR: bilateral reticular infiltrates - improving on the right side  -Off pressors now  -patient is now on ventimask - 15L   Completed course of unasyn for possible aspiration PNA  Completed course of Plaquenil, completed course of solumedrol  S/p 1 dose of tocilizumab  - on 3% nebulized saline to soften secretions  - will decrease sedation as tolerated  - patient requiring low dose pressors for low blood pressure, likely due to sedation   - will do SBT on 4/5/2020    # high lactate --> resolved  - CT abdomen negative for mesenteric ischemia.     # Chronic A.fib with high rate A.fib  - resolved  - amiodarone   - Lovenox therapeutic     Diet: tube feeds  Lines: left IJ  Garcia present  DVT prophylaxis: Lovenox therapeutic  Ambulation: bedrest   Drips: propofol and morphine CLIFTON LYNN 70y Female  MRN#: [id number]   CODE STATUS: FULL     SUBJECTIVE  Patient is a 70y old Female who presents with a chief complaint of covid r/o given fevers, non productive cough (06 Apr 2020 08:56)  Currently admitted to medicine with the primary diagnosis of Sepsis    Today is hospital day 15d, and this morning she is awake but not responsive to commands.     Present Today:           Garcia Catheter ()No/ (X)Yes? Indication: will d/c today           Central Line ()No/ (x)Yes? Indication: will d/c today           IV Fluids (x)No/ ()Yes? Type:  Rate:  Indication:      OBJECTIVE  PAST MEDICAL & SURGICAL HISTORY  Afib    ALLERGIES:  Originally Entered as [Unknown] reaction to [green pepper] (Unknown)  Originally Entered as [Unknown] reaction to [pepper] (Unknown)  sulfa drugs (Unknown)    MEDICATIONS:  STANDING MEDICATIONS  aMIOdarone    Tablet 100 milliGRAM(s) Oral daily  amLODIPine   Tablet 5 milliGRAM(s) Oral daily  enoxaparin Injectable 65 milliGRAM(s) SubCutaneous every 12 hours  pantoprazole   Suspension 40 milliGRAM(s) Oral daily  polyethylene glycol 3350 17 Gram(s) Oral daily  senna 2 Tablet(s) Oral at bedtime  thiamine 100 milliGRAM(s) Oral daily    PRN MEDICATIONS  acetaminophen   Tablet .. 650 milliGRAM(s) Oral every 6 hours PRN      VITAL SIGNS: Last 24 Hours  T(C): 36.1 (06 Apr 2020 00:00), Max: 36.3 (05 Apr 2020 12:00)  T(F): 97 (06 Apr 2020 00:00), Max: 97.3 (05 Apr 2020 12:00)  HR: 88 (06 Apr 2020 06:00) (64 - 120)  BP: --  BP(mean): --  RR: 16 (06 Apr 2020 06:00) (11 - 28)  SpO2: 97% (06 Apr 2020 09:56) (94% - 99%)    LABS:                        12.0   8.56  )-----------( 292      ( 06 Apr 2020 05:45 )             36.3     04-06    141  |  99  |  13  ----------------------------<  108<H>  3.7   |  28  |  0.5<L>    Ca    8.7      06 Apr 2020 05:45  Mg     2.1     04-05    TPro  5.4<L>  /  Alb  3.0<L>  /  TBili  0.8  /  DBili  x   /  AST  72<H>  /  ALT  178<H>  /  AlkPhos  187<H>  04-06        ABG - ( 05 Apr 2020 09:29 )  pH, Arterial: 7.45  pH, Blood: x     /  pCO2: 47    /  pO2: 69    / HCO3: 32    / Base Excess: 6.5   /  SaO2: 94          RADIOLOGY:  none today     PHYSICAL EXAM:    GENERAL: awake but lethargic, not following commands   HEENT: conjunctiva and sclera clear  PULMONARY: Clear to auscultation bilaterally  CARDIOVASCULAR: Regular rate and rhythm  GASTROINTESTINAL: Soft, Nontender, Nondistended  MUSCULOSKELETAL:  2+ Peripheral Pulses, No clubbing, cyanosis, or edema  NEUROLOGY: non-focal  SKIN: No rashes     ADMISSION SUMMARY  Patient is a 70y old Female who presents with a chief complaint of covid r/o given fevers, non productive cough (06 Apr 2020 08:56)  Currently admitted to medicine with the primary diagnosis of Sepsis    ASSESSMENT & PLAN  71 y/o female with pmh of a-fib, c/o fever, non-productive cough, body aches and decreased appetite x 7 days. No known sick contacts. No diarrhea. No ABD pain.   Patient was found to be COVID +. She required intubated on 3/24/2020 for hypoxic respiratory failure. Extubated 4/5.     #Acute hypoxic respiratory failure due to COVID-19 +, requiring intubation on 3/24, extubated on 4/5, now on ventimask   -CXR: bilateral reticular infiltrates - improving on the right side  -Off pressors now, off sedation   -patient is now on ventimask - 15L   -Completed course of unasyn for possible aspiration PNA  -Completed course of Plaquenil, completed course of solumedrol  - S/p 1 dose of tocilizumab    #High lactate, resolved   - CT abdomen negative for mesenteric ischemia.     # Chronic A.fib with high rate A.fib  - resolved  - amiodarone   - lovenox therapeutic - once patient has NG tube will switch to eliquis     Diet: tube feeds  Lines: left IJ - will d/c   Garcia present - willd/c   DVT prophylaxis: Lovenox therapeutic  Ambulation: bedrest   Dispo: downgrade to floor likely tomorrow     Ventilation: ventimask at 15L   Sedation: none  Pressors: none   Other drips: none   Lines: L IJ & a-line will be d/c     Procalcitonin: 0.05   Steroids: completed  Abx: completed unasyn CLIFTON LYNN 70y Female  MRN#: [id number]   CODE STATUS: FULL     SUBJECTIVE  Patient is a 70y old Female who presents with a chief complaint of covid r/o given fevers, non productive cough (06 Apr 2020 08:56)  Currently admitted to medicine with the primary diagnosis of Sepsis    Today is hospital day 15d, and this morning she is awake but not responsive to commands.     Present Today:           Garcia Catheter ()No/ (X)Yes? Indication: will d/c today           Central Line ()No/ (x)Yes? Indication: will d/c today           IV Fluids (x)No/ ()Yes? Type:  Rate:  Indication:      OBJECTIVE  PAST MEDICAL & SURGICAL HISTORY  Afib    ALLERGIES:  Originally Entered as [Unknown] reaction to [green pepper] (Unknown)  Originally Entered as [Unknown] reaction to [pepper] (Unknown)  sulfa drugs (Unknown)    MEDICATIONS:  STANDING MEDICATIONS  aMIOdarone    Tablet 100 milliGRAM(s) Oral daily  amLODIPine   Tablet 5 milliGRAM(s) Oral daily  enoxaparin Injectable 65 milliGRAM(s) SubCutaneous every 12 hours  pantoprazole   Suspension 40 milliGRAM(s) Oral daily  polyethylene glycol 3350 17 Gram(s) Oral daily  senna 2 Tablet(s) Oral at bedtime  thiamine 100 milliGRAM(s) Oral daily    PRN MEDICATIONS  acetaminophen   Tablet .. 650 milliGRAM(s) Oral every 6 hours PRN      VITAL SIGNS: Last 24 Hours  T(C): 36.1 (06 Apr 2020 00:00), Max: 36.3 (05 Apr 2020 12:00)  T(F): 97 (06 Apr 2020 00:00), Max: 97.3 (05 Apr 2020 12:00)  HR: 88 (06 Apr 2020 06:00) (64 - 120)  BP: --  BP(mean): --  RR: 16 (06 Apr 2020 06:00) (11 - 28)  SpO2: 97% (06 Apr 2020 09:56) (94% - 99%)    LABS:                        12.0   8.56  )-----------( 292      ( 06 Apr 2020 05:45 )             36.3     04-06    141  |  99  |  13  ----------------------------<  108<H>  3.7   |  28  |  0.5<L>    Ca    8.7      06 Apr 2020 05:45  Mg     2.1     04-05    TPro  5.4<L>  /  Alb  3.0<L>  /  TBili  0.8  /  DBili  x   /  AST  72<H>  /  ALT  178<H>  /  AlkPhos  187<H>  04-06        ABG - ( 05 Apr 2020 09:29 )  pH, Arterial: 7.45  pH, Blood: x     /  pCO2: 47    /  pO2: 69    / HCO3: 32    / Base Excess: 6.5   /  SaO2: 94          RADIOLOGY:  none today     PHYSICAL EXAM:    GENERAL: awake but lethargic, not following commands   HEENT: conjunctiva and sclera clear  PULMONARY: Clear to auscultation bilaterally  CARDIOVASCULAR: Regular rate and rhythm  GASTROINTESTINAL: Soft, Nontender, Nondistended  MUSCULOSKELETAL:  2+ Peripheral Pulses, No clubbing, cyanosis, or edema  NEUROLOGY: non-focal  SKIN: No rashes     ADMISSION SUMMARY  Patient is a 70y old Female who presents with a chief complaint of covid r/o given fevers, non productive cough (06 Apr 2020 08:56)  Currently admitted to medicine with the primary diagnosis of Sepsis    ASSESSMENT & PLAN  71 y/o female with pmh of a-fib, c/o fever, non-productive cough, body aches and decreased appetite x 7 days. No known sick contacts. No diarrhea. No ABD pain.   Patient was found to be COVID +. She required intubated on 3/24/2020 for hypoxic respiratory failure. Extubated 4/5.     #Acute hypoxic respiratory failure due to COVID-19 +, requiring intubation on 3/24, extubated on 4/5, now on ventimask   -CXR: bilateral reticular infiltrates - improving on the right side  -Off pressors now, off sedation   -patient is now on ventimask - 15L   -Completed course of unasyn for possible aspiration PNA  -Completed course of Plaquenil, completed course of solumedrol  - S/p 1 dose of tocilizumab    #High lactate, resolved   - CT abdomen negative for mesenteric ischemia.     # Chronic A.fib with high rate A.fib  - resolved  - amiodarone   - lovenox therapeutic - once patient has NG tube will switch to eliquis 2.5 BID (home dose)     Diet: tube feeds  Lines: left IJ - will d/c   Garcia present - will d/c   DVT prophylaxis: Lovenox therapeutic  Ambulation: bedrest   Dispo: downgrade to floor likely tomorrow     Ventilation: ventimask at 15L   Sedation: none  Pressors: none   Other drips: none   Lines: L IJ & a-line will be d/c     Procalcitonin: 0.05   Steroids: completed  Abx: completed unasyn CLIFTON LYNN 70y Female  MRN#: [id number]   CODE STATUS: FULL     SUBJECTIVE  Patient is a 70y old Female who presents with a chief complaint of covid r/o given fevers, non productive cough (06 Apr 2020 08:56)  Currently admitted to medicine with the primary diagnosis of Sepsis    Today is hospital day 15d, and this morning she is awake but not responsive to commands.     Present Today:           Garcia Catheter ()No/ (X)Yes? Indication: will d/c today           Central Line ()No/ (x)Yes? Indication: will d/c today           IV Fluids (x)No/ ()Yes? Type:  Rate:  Indication:      OBJECTIVE  PAST MEDICAL & SURGICAL HISTORY  Afib    ALLERGIES:  Originally Entered as [Unknown] reaction to [green pepper] (Unknown)  Originally Entered as [Unknown] reaction to [pepper] (Unknown)  sulfa drugs (Unknown)    MEDICATIONS:  STANDING MEDICATIONS  aMIOdarone    Tablet 100 milliGRAM(s) Oral daily  amLODIPine   Tablet 5 milliGRAM(s) Oral daily  enoxaparin Injectable 65 milliGRAM(s) SubCutaneous every 12 hours  pantoprazole   Suspension 40 milliGRAM(s) Oral daily  polyethylene glycol 3350 17 Gram(s) Oral daily  senna 2 Tablet(s) Oral at bedtime  thiamine 100 milliGRAM(s) Oral daily    PRN MEDICATIONS  acetaminophen   Tablet .. 650 milliGRAM(s) Oral every 6 hours PRN      VITAL SIGNS: Last 24 Hours  T(C): 36.1 (06 Apr 2020 00:00), Max: 36.3 (05 Apr 2020 12:00)  T(F): 97 (06 Apr 2020 00:00), Max: 97.3 (05 Apr 2020 12:00)  HR: 88 (06 Apr 2020 06:00) (64 - 120)  BP: --  BP(mean): --  RR: 16 (06 Apr 2020 06:00) (11 - 28)  SpO2: 97% (06 Apr 2020 09:56) (94% - 99%)    LABS:                        12.0   8.56  )-----------( 292      ( 06 Apr 2020 05:45 )             36.3     04-06    141  |  99  |  13  ----------------------------<  108<H>  3.7   |  28  |  0.5<L>    Ca    8.7      06 Apr 2020 05:45  Mg     2.1     04-05    TPro  5.4<L>  /  Alb  3.0<L>  /  TBili  0.8  /  DBili  x   /  AST  72<H>  /  ALT  178<H>  /  AlkPhos  187<H>  04-06        ABG - ( 05 Apr 2020 09:29 )  pH, Arterial: 7.45  pH, Blood: x     /  pCO2: 47    /  pO2: 69    / HCO3: 32    / Base Excess: 6.5   /  SaO2: 94          RADIOLOGY:  none today     PHYSICAL EXAM:    GENERAL: awake but lethargic, not following commands   HEENT: conjunctiva and sclera clear  PULMONARY: Clear to auscultation bilaterally  CARDIOVASCULAR: Regular rate and rhythm  GASTROINTESTINAL: Soft, Nontender, Nondistended  MUSCULOSKELETAL:  2+ Peripheral Pulses, No clubbing, cyanosis, or edema  NEUROLOGY: non-focal  SKIN: No rashes     ADMISSION SUMMARY  Patient is a 70y old Female who presents with a chief complaint of covid r/o given fevers, non productive cough (06 Apr 2020 08:56)  Currently admitted to medicine with the primary diagnosis of Sepsis    ASSESSMENT & PLAN  71 y/o female with pmh of a-fib, c/o fever, non-productive cough, body aches and decreased appetite x 7 days. No known sick contacts. No diarrhea. No ABD pain.   Patient was found to be COVID +. She required intubated on 3/24/2020 for hypoxic respiratory failure. Extubated 4/5.     #Acute hypoxic respiratory failure due to COVID-19 +, requiring intubation on 3/24, extubated on 4/5, now on ventimask   -CXR: bilateral reticular infiltrates - improving on the right side  -Off pressors now, off sedation   -patient is now on ventimask - 15L   -Completed course of unasyn for possible aspiration PNA  -Completed course of Plaquenil, completed course of solumedrol  - S/p 1 dose of tocilizumab    #High lactate, resolved   - CT abdomen negative for mesenteric ischemia.     # Chronic A.fib with high rate A.fib   - patient was on amiodarone but started to become tachycardic today to 150s - started amio drip (loading) and esmolol drip   - lovenox therapeutic - once patient has NG tube will switch to eliquis 2.5 BID (home dose)     Diet: tube feeds  Lines: left IJ - will d/c   Radha present - will d/c   DVT prophylaxis: Lovenox therapeutic  Ambulation: bedrest   Dispo: downgrade to floor likely tomorrow     Ventilation: ventimask at 15L   Sedation: none  Pressors: none   Other drips: none   Lines: L IJ & a-line will be d/c     Procalcitonin: 0.05   Steroids: completed  Abx: completed unasyn

## 2020-04-06 NOTE — PROGRESS NOTE ADULT - SUBJECTIVE AND OBJECTIVE BOX
CLIFTON LYNN  70y, Female    All available historical data reviewed    OVERNIGHT EVENTS:  no fevers, extubated  no pressors    ROS:  unable to obtain history secondary to patient's mental status and/or sedation     VITALS:  T(F): 97, Max: 97.3 (04-05-20 @ 12:00)  HR: 88  BP: --  RR: 16Vital Signs Last 24 Hrs  T(C): 36.1 (06 Apr 2020 00:00), Max: 36.3 (05 Apr 2020 12:00)  T(F): 97 (06 Apr 2020 00:00), Max: 97.3 (05 Apr 2020 12:00)  HR: 88 (06 Apr 2020 06:00) (64 - 120)  BP: --  BP(mean): --  RR: 16 (06 Apr 2020 06:00) (11 - 28)  SpO2: 97% (06 Apr 2020 06:00) (94% - 99%)    TESTS & MEASUREMENTS:                        12.0   8.56  )-----------( 292      ( 06 Apr 2020 05:45 )             36.3     04-06    141  |  99  |  13  ----------------------------<  108<H>  3.7   |  28  |  0.5<L>    Ca    8.7      06 Apr 2020 05:45  Mg     2.1     04-05    TPro  5.4<L>  /  Alb  3.0<L>  /  TBili  0.8  /  DBili  x   /  AST  72<H>  /  ALT  178<H>  /  AlkPhos  187<H>  04-06    LIVER FUNCTIONS - ( 06 Apr 2020 05:45 )  Alb: 3.0 g/dL / Pro: 5.4 g/dL / ALK PHOS: 187 U/L / ALT: 178 U/L / AST: 72 U/L / GGT: x             Culture - Blood (collected 04-01-20 @ 12:11)  Source: .Blood None  Final Report (04-05-20 @ 22:01):    No Growth Final            RADIOLOGY & ADDITIONAL TESTS:  Personal review of radiological diagnostics performed  Echo and EKG results noted when applicable.     MEDICATIONS:  acetaminophen   Tablet .. 650 milliGRAM(s) Oral every 6 hours PRN  ALBUTerol   0.5% 2.5 milliGRAM(s) Nebulizer two times a day  aMIOdarone    Tablet 100 milliGRAM(s) Oral daily  amLODIPine   Tablet 5 milliGRAM(s) Oral daily  enoxaparin Injectable 65 milliGRAM(s) SubCutaneous every 12 hours  pantoprazole   Suspension 40 milliGRAM(s) Oral daily  polyethylene glycol 3350 17 Gram(s) Oral daily  senna 2 Tablet(s) Oral at bedtime  thiamine 100 milliGRAM(s) Oral daily      ANTIBIOTICS:

## 2020-04-06 NOTE — PROGRESS NOTE ADULT - SUBJECTIVE AND OBJECTIVE BOX
Patient is a 70y old  Female who presents with a chief complaint of covid r/o given fevers, non productive cough (05 Apr 2020 12:24)        Over Night Events:  Extubated yesterday         ROS:     All ROS are negative except HPI         PHYSICAL EXAM    ICU Vital Signs Last 24 Hrs  T(C): 36.2 (05 Apr 2020 16:00), Max: 36.3 (05 Apr 2020 12:00)  T(F): 97.2 (05 Apr 2020 16:00), Max: 97.3 (05 Apr 2020 12:00)  HR: 91 (05 Apr 2020 20:28) (64 - 120)  BP: --  BP(mean): --  ABP: 130/72 (05 Apr 2020 20:00) (130/72 - 206/94)  ABP(mean): 92 (05 Apr 2020 20:00) (90 - 148)  RR: 18 (05 Apr 2020 20:00) (11 - 28)  SpO2: 95% (05 Apr 2020 20:28) (94% - 100%)      CONSTITUTIONAL:   Ill appearing.  Well nourished.  NAD    ENT:   Airway patent,   Mouth with normal mucosa.   No thrush    EYES:   Pupils equal,   Round and reactive to light.    CARDIAC:   Normal rate,   Regular rhythm.    No edema      Vascular:  Normal systolic impulse  No Carotid bruits    RESPIRATORY:   No wheezing  Bilateral BS  Normal chest expansion  Not tachypneic,  No use of accessory muscles    GASTROINTESTINAL:  Abdomen soft,   Non-tender,   No guarding,   + BS    GENITOURINARY  normal genitalia for sex  no edema    MUSCULOSKELETAL:   Range of motion is not limited,  No clubbing, cyanosis    NEUROLOGICAL:   Alert and oriented   No motor  deficits.  pertinent DTRs normal    SKIN:   Skin normal color for race,   Warm and dry and intact.   No evidence of rash.    PSYCHIATRIC:   Normal mood and affect.   No apparent risk to self or others.    HEMATOLOGICAL:  No cervical  lymphadenopathy.  no inguinal lymphadenopathy      04-05-20 @ 07:01  -  04-06-20 @ 07:00  --------------------------------------------------------  IN:    lactated ringers.: 50 mL    morphine  Infusion: 3 mL    propofol Infusion: 15.6 mL  Total IN: 68.6 mL    OUT:    Indwelling Catheter - Urethral: 1600 mL  Total OUT: 1600 mL    Total NET: -1531.4 mL          LABS:                            12.0   8.56  )-----------( 292      ( 06 Apr 2020 05:45 )             36.3                                               04-06    141  |  99  |  13  ----------------------------<  108<H>  3.7   |  28  |  0.5<L>    Ca    8.7      06 Apr 2020 05:45  Mg     2.1     04-05    TPro  5.4<L>  /  Alb  3.0<L>  /  TBili  0.8  /  DBili  x   /  AST  72<H>  /  ALT  178<H>  /  AlkPhos  187<H>  04-06                                                                                           LIVER FUNCTIONS - ( 06 Apr 2020 05:45 )  Alb: 3.0 g/dL / Pro: 5.4 g/dL / ALK PHOS: 187 U/L / ALT: 178 U/L / AST: 72 U/L / GGT: x                                                                                               Mode: AC/ CMV (Assist Control/ Continuous Mandatory Ventilation)  RR (machine): 15  TV (machine): 400  FiO2: 35  PEEP: 8                                      ABG - ( 05 Apr 2020 09:29 )  pH, Arterial: 7.45  pH, Blood: x     /  pCO2: 47    /  pO2: 69    / HCO3: 32    / Base Excess: 6.5   /  SaO2: 94                  MEDICATIONS  (STANDING):  ALBUTerol   0.5% 2.5 milliGRAM(s) Nebulizer two times a day  aMIOdarone    Tablet 100 milliGRAM(s) Oral daily  amLODIPine   Tablet 5 milliGRAM(s) Oral daily  enoxaparin Injectable 65 milliGRAM(s) SubCutaneous every 12 hours  pantoprazole   Suspension 40 milliGRAM(s) Oral daily  polyethylene glycol 3350 17 Gram(s) Oral daily  senna 2 Tablet(s) Oral at bedtime  thiamine 100 milliGRAM(s) Oral daily    MEDICATIONS  (PRN):  acetaminophen   Tablet .. 650 milliGRAM(s) Oral every 6 hours PRN Temp greater or equal to 38C (100.4F)      New X-rays reviewed:                                                                                  ECHO    CXR interpreted by me: Patient is a 70y old  Female who presents with a chief complaint of covid r/o given fevers, non productive cough (05 Apr 2020 12:24)        Over Night Events:  Extubated yesterday         ROS:     All ROS are negative except HPI         PHYSICAL EXAM    ICU Vital Signs Last 24 Hrs  T(C): 36.2 (05 Apr 2020 16:00), Max: 36.3 (05 Apr 2020 12:00)  T(F): 97.2 (05 Apr 2020 16:00), Max: 97.3 (05 Apr 2020 12:00)  HR: 91 (05 Apr 2020 20:28) (64 - 120)  BP: --  BP(mean): --  ABP: 130/72 (05 Apr 2020 20:00) (130/72 - 206/94)  ABP(mean): 92 (05 Apr 2020 20:00) (90 - 148)  RR: 18 (05 Apr 2020 20:00) (11 - 28)  SpO2: 95% (05 Apr 2020 20:28) (94% - 100%)      CONSTITUTIONAL:   Ill appearing.  Well nourished.  NAD    ENT:   Airway patent,   Mouth with normal mucosa.   No thrush    EYES:   Pupils equal,   Round and reactive to light.    CARDIAC:   Normal rate,   Regular rhythm.    No edema      Vascular:  Normal systolic impulse  No Carotid bruits    RESPIRATORY:   No wheezing  Bilateral BS  Normal chest expansion  Not tachypneic,  No use of accessory muscles    GASTROINTESTINAL:  Abdomen soft,   Non-tender,   No guarding,   + BS    GENITOURINARY  normal genitalia for sex  no edema    MUSCULOSKELETAL:   Range of motion is not limited,  No clubbing, cyanosis    NEUROLOGICAL:   Awake   DOes not follow commands     SKIN:   Skin normal color for race,   Warm and dry and intact.   No evidence of rash.    PSYCHIATRIC:   No apparent risk to self or others.    HEMATOLOGICAL:  No cervical  lymphadenopathy.  no inguinal lymphadenopathy      04-05-20 @ 07:01  -  04-06-20 @ 07:00  --------------------------------------------------------  IN:    lactated ringers.: 50 mL    morphine  Infusion: 3 mL    propofol Infusion: 15.6 mL  Total IN: 68.6 mL    OUT:    Indwelling Catheter - Urethral: 1600 mL  Total OUT: 1600 mL    Total NET: -1531.4 mL          LABS:                            12.0   8.56  )-----------( 292      ( 06 Apr 2020 05:45 )             36.3                                               04-06    141  |  99  |  13  ----------------------------<  108<H>  3.7   |  28  |  0.5<L>    Ca    8.7      06 Apr 2020 05:45  Mg     2.1     04-05    TPro  5.4<L>  /  Alb  3.0<L>  /  TBili  0.8  /  DBili  x   /  AST  72<H>  /  ALT  178<H>  /  AlkPhos  187<H>  04-06                                                                                           LIVER FUNCTIONS - ( 06 Apr 2020 05:45 )  Alb: 3.0 g/dL / Pro: 5.4 g/dL / ALK PHOS: 187 U/L / ALT: 178 U/L / AST: 72 U/L / GGT: x                                                                                               Mode: AC/ CMV (Assist Control/ Continuous Mandatory Ventilation)  RR (machine): 15  TV (machine): 400  FiO2: 35  PEEP: 8                                      ABG - ( 05 Apr 2020 09:29 )  pH, Arterial: 7.45  pH, Blood: x     /  pCO2: 47    /  pO2: 69    / HCO3: 32    / Base Excess: 6.5   /  SaO2: 94                  MEDICATIONS  (STANDING):  ALBUTerol   0.5% 2.5 milliGRAM(s) Nebulizer two times a day  aMIOdarone    Tablet 100 milliGRAM(s) Oral daily  amLODIPine   Tablet 5 milliGRAM(s) Oral daily  enoxaparin Injectable 65 milliGRAM(s) SubCutaneous every 12 hours  pantoprazole   Suspension 40 milliGRAM(s) Oral daily  polyethylene glycol 3350 17 Gram(s) Oral daily  senna 2 Tablet(s) Oral at bedtime  thiamine 100 milliGRAM(s) Oral daily    MEDICATIONS  (PRN):  acetaminophen   Tablet .. 650 milliGRAM(s) Oral every 6 hours PRN Temp greater or equal to 38C (100.4F)      New X-rays reviewed:                                                                                  ECHO    CXR interpreted by me:

## 2020-04-07 LAB
ANION GAP SERPL CALC-SCNC: 14 MMOL/L — SIGNIFICANT CHANGE UP (ref 7–14)
BUN SERPL-MCNC: 16 MG/DL — SIGNIFICANT CHANGE UP (ref 10–20)
CALCIUM SERPL-MCNC: 8.5 MG/DL — SIGNIFICANT CHANGE UP (ref 8.5–10.1)
CHLORIDE SERPL-SCNC: 105 MMOL/L — SIGNIFICANT CHANGE UP (ref 98–110)
CO2 SERPL-SCNC: 26 MMOL/L — SIGNIFICANT CHANGE UP (ref 17–32)
CREAT SERPL-MCNC: 0.5 MG/DL — LOW (ref 0.7–1.5)
GLUCOSE SERPL-MCNC: 105 MG/DL — HIGH (ref 70–99)
HCT VFR BLD CALC: 35.4 % — LOW (ref 37–47)
HGB BLD-MCNC: 11.5 G/DL — LOW (ref 12–16)
MAGNESIUM SERPL-MCNC: 2.1 MG/DL — SIGNIFICANT CHANGE UP (ref 1.8–2.4)
MCHC RBC-ENTMCNC: 28.9 PG — SIGNIFICANT CHANGE UP (ref 27–31)
MCHC RBC-ENTMCNC: 32.5 G/DL — SIGNIFICANT CHANGE UP (ref 32–37)
MCV RBC AUTO: 88.9 FL — SIGNIFICANT CHANGE UP (ref 81–99)
NRBC # BLD: 0 /100 WBCS — SIGNIFICANT CHANGE UP (ref 0–0)
PLATELET # BLD AUTO: 307 K/UL — SIGNIFICANT CHANGE UP (ref 130–400)
POTASSIUM SERPL-MCNC: 3.1 MMOL/L — LOW (ref 3.5–5)
POTASSIUM SERPL-SCNC: 3.1 MMOL/L — LOW (ref 3.5–5)
RBC # BLD: 3.98 M/UL — LOW (ref 4.2–5.4)
RBC # FLD: 15 % — HIGH (ref 11.5–14.5)
SODIUM SERPL-SCNC: 145 MMOL/L — SIGNIFICANT CHANGE UP (ref 135–146)
WBC # BLD: 8.65 K/UL — SIGNIFICANT CHANGE UP (ref 4.8–10.8)
WBC # FLD AUTO: 8.65 K/UL — SIGNIFICANT CHANGE UP (ref 4.8–10.8)

## 2020-04-07 PROCEDURE — 71045 X-RAY EXAM CHEST 1 VIEW: CPT | Mod: 26

## 2020-04-07 RX ORDER — METOPROLOL TARTRATE 50 MG
25 TABLET ORAL
Refills: 0 | Status: DISCONTINUED | OUTPATIENT
Start: 2020-04-07 | End: 2020-04-16

## 2020-04-07 RX ORDER — POTASSIUM CHLORIDE 20 MEQ
20 PACKET (EA) ORAL
Refills: 0 | Status: COMPLETED | OUTPATIENT
Start: 2020-04-07 | End: 2020-04-07

## 2020-04-07 RX ADMIN — Medication 50 MILLIEQUIVALENT(S): at 14:51

## 2020-04-07 RX ADMIN — Medication 50 MILLIEQUIVALENT(S): at 11:19

## 2020-04-07 RX ADMIN — Medication 100 MILLIGRAM(S): at 12:20

## 2020-04-07 RX ADMIN — POLYETHYLENE GLYCOL 3350 17 GRAM(S): 17 POWDER, FOR SOLUTION ORAL at 12:21

## 2020-04-07 RX ADMIN — ENOXAPARIN SODIUM 65 MILLIGRAM(S): 100 INJECTION SUBCUTANEOUS at 06:24

## 2020-04-07 RX ADMIN — AMIODARONE HYDROCHLORIDE 100 MILLIGRAM(S): 400 TABLET ORAL at 12:20

## 2020-04-07 RX ADMIN — Medication 25 MILLIGRAM(S): at 17:08

## 2020-04-07 RX ADMIN — ENOXAPARIN SODIUM 65 MILLIGRAM(S): 100 INJECTION SUBCUTANEOUS at 17:08

## 2020-04-07 RX ADMIN — PANTOPRAZOLE SODIUM 40 MILLIGRAM(S): 20 TABLET, DELAYED RELEASE ORAL at 12:21

## 2020-04-07 RX ADMIN — Medication 50 MILLIEQUIVALENT(S): at 14:49

## 2020-04-07 NOTE — PROGRESS NOTE ADULT - SUBJECTIVE AND OBJECTIVE BOX
CLIFTON LYNN 70y Female  MRN#: [id number]   CODE STATUS: FULL       SUBJECTIVE  Patient is a 70y old Female who presents with a chief complaint of covid r/o given fevers, non productive cough (07 Apr 2020 09:37)  Currently admitted to medicine with the primary diagnosis of Sepsis    Today is hospital day 16d. Patient awake and more alert. Not responsive to commands. RN said patient passed bedside swallowing assessment.     OBJECTIVE  PAST MEDICAL & SURGICAL HISTORY  Afib    ALLERGIES:  Originally Entered as [Unknown] reaction to [green pepper] (Unknown)  Originally Entered as [Unknown] reaction to [pepper] (Unknown)  sulfa drugs (Unknown)    MEDICATIONS:  STANDING MEDICATIONS  aMIOdarone    Tablet 100 milliGRAM(s) Oral daily  amLODIPine   Tablet 5 milliGRAM(s) Oral daily  enoxaparin Injectable 65 milliGRAM(s) SubCutaneous every 12 hours  metoprolol tartrate 25 milliGRAM(s) Oral two times a day  pantoprazole   Suspension 40 milliGRAM(s) Oral daily  polyethylene glycol 3350 17 Gram(s) Oral daily  potassium chloride  20 mEq/100 mL IVPB 20 milliEquivalent(s) IV Intermittent every 2 hours  senna 2 Tablet(s) Oral at bedtime  thiamine 100 milliGRAM(s) Oral daily    PRN MEDICATIONS  acetaminophen   Tablet .. 650 milliGRAM(s) Oral every 6 hours PRN      VITAL SIGNS: Last 24 Hours  T(C): 36.2 (07 Apr 2020 08:00), Max: 36.2 (07 Apr 2020 08:00)  T(F): 97.1 (07 Apr 2020 08:00), Max: 97.1 (07 Apr 2020 08:00)  HR: 76 (07 Apr 2020 10:00) (64 - 128)  BP: --  BP(mean): --  RR: 17 (07 Apr 2020 10:00) (13 - 36)  SpO2: 95% (07 Apr 2020 10:00) (91% - 100%)    LABS:                        11.5   8.65  )-----------( 307      ( 07 Apr 2020 05:00 )             35.4     04-07    145  |  105  |  16  ----------------------------<  105<H>  3.1<L>   |  26  |  0.5<L>    Ca    8.5      07 Apr 2020 05:00  Mg     2.1     04-07    TPro  5.4<L>  /  Alb  3.0<L>  /  TBili  0.8  /  DBili  x   /  AST  72<H>  /  ALT  178<H>  /  AlkPhos  187<H>  04-06      RADIOLOGY:  < from: Xray Chest 1 View- PORTABLE-Routine (04.07.20 @ 03:38) >  Impression:  Stable bilateral parenchymal opacities. No pneumothorax   Left IJ line tip, now located in the region of the distal left internal jugular vein  < end of copied text >      PHYSICAL EXAM:  GENERAL: awake and more alert, responds to verbal stimuli, not following commands   HEENT: conjunctiva and sclera clear  PULMONARY: Clear to auscultation bilaterally  CARDIOVASCULAR: Regular rate and rhythm  GASTROINTESTINAL: Soft, Nontender, Nondistended  MUSCULOSKELETAL:  2+ Peripheral Pulses  NEUROLOGY: non-focal  SKIN: No rashes     ADMISSION SUMMARY  Patient is a 70y old Female who presents with a chief complaint of covid r/o given fevers, non productive cough (07 Apr 2020 09:37)  Currently admitted to medicine with the primary diagnosis of Sepsis    ASSESSMENT & PLAN  69 y/o female with pmh of a-fib, c/o fever, non-productive cough, body aches and decreased appetite x 7 days. No known sick contacts. No diarrhea. No ABD pain.   Patient was found to be COVID +. She required intubated on 3/24/2020 for hypoxic respiratory failure. Extubated 4/5.     #Acute hypoxic respiratory failure due to COVID-19 +, requiring intubation on 3/24, extubated on 4/5, now on ventimask   -CXR: bilateral reticular infiltrates - improving on the right side  -Off pressors now, off sedation   -patient is now on ventimask - 15L   -Completed course of unasyn for possible aspiration PNA  -Completed course of Plaquenil, completed course of solumedrol  - S/p 1 dose of tocilizumab    #High lactate, resolved   - CT abdomen negative for mesenteric ischemia.     # Chronic A.fib with high rate A.fib   - patient was on amiodarone but started to become tachycardic today to 150s - started amio drip (loading) and esmolol drip   - lovenox therapeutic - once patient has NG tube will switch to eliquis 2.5 BID (home dose)     Diet: tube feeds  Lines: left IJ - will d/c   Radha present - will d/c   DVT prophylaxis: Lovenox therapeutic  Ambulation: bedrest   Dispo: downgrade to floor likely tomorrow     Ventilation: ventimask at 15L   Sedation: none  Pressors: none   Other drips: none   Lines: L IJ & a-line will be d/c     Procalcitonin: 0.05   Steroids: completed  Abx: completed unasyn CLIFTON LYNN 70y Female  MRN#: [id number]   CODE STATUS: FULL       SUBJECTIVE  Patient is a 70y old Female who presents with a chief complaint of covid r/o given fevers, non productive cough (07 Apr 2020 09:37)  Currently admitted to medicine with the primary diagnosis of Sepsis    Today is hospital day 16d. Patient awake and more alert. Not responsive to commands. RN said patient passed bedside swallowing assessment.     OBJECTIVE  PAST MEDICAL & SURGICAL HISTORY  Afib    ALLERGIES:  Originally Entered as [Unknown] reaction to [green pepper] (Unknown)  Originally Entered as [Unknown] reaction to [pepper] (Unknown)  sulfa drugs (Unknown)    MEDICATIONS:  STANDING MEDICATIONS  aMIOdarone    Tablet 100 milliGRAM(s) Oral daily  amLODIPine   Tablet 5 milliGRAM(s) Oral daily  enoxaparin Injectable 65 milliGRAM(s) SubCutaneous every 12 hours  metoprolol tartrate 25 milliGRAM(s) Oral two times a day  pantoprazole   Suspension 40 milliGRAM(s) Oral daily  polyethylene glycol 3350 17 Gram(s) Oral daily  potassium chloride  20 mEq/100 mL IVPB 20 milliEquivalent(s) IV Intermittent every 2 hours  senna 2 Tablet(s) Oral at bedtime  thiamine 100 milliGRAM(s) Oral daily    PRN MEDICATIONS  acetaminophen   Tablet .. 650 milliGRAM(s) Oral every 6 hours PRN      VITAL SIGNS: Last 24 Hours  T(C): 36.2 (07 Apr 2020 08:00), Max: 36.2 (07 Apr 2020 08:00)  T(F): 97.1 (07 Apr 2020 08:00), Max: 97.1 (07 Apr 2020 08:00)  HR: 76 (07 Apr 2020 10:00) (64 - 128)  BP: --  BP(mean): --  RR: 17 (07 Apr 2020 10:00) (13 - 36)  SpO2: 95% (07 Apr 2020 10:00) (91% - 100%)    LABS:                        11.5   8.65  )-----------( 307      ( 07 Apr 2020 05:00 )             35.4     04-07    145  |  105  |  16  ----------------------------<  105<H>  3.1<L>   |  26  |  0.5<L>    Ca    8.5      07 Apr 2020 05:00  Mg     2.1     04-07    TPro  5.4<L>  /  Alb  3.0<L>  /  TBili  0.8  /  DBili  x   /  AST  72<H>  /  ALT  178<H>  /  AlkPhos  187<H>  04-06      RADIOLOGY:  < from: Xray Chest 1 View- PORTABLE-Routine (04.07.20 @ 03:38) >  Impression:  Stable bilateral parenchymal opacities. No pneumothorax   Left IJ line tip, now located in the region of the distal left internal jugular vein  < end of copied text >      PHYSICAL EXAM:  GENERAL: awake and more alert, responds to verbal stimuli, not following commands   HEENT: conjunctiva and sclera clear  PULMONARY: Clear to auscultation bilaterally  CARDIOVASCULAR: Regular rate and rhythm  GASTROINTESTINAL: Soft, Nontender, Nondistended  MUSCULOSKELETAL:  2+ Peripheral Pulses  NEUROLOGY: non-focal  SKIN: No rashes     ADMISSION SUMMARY  Patient is a 70y old Female who presents with a chief complaint of covid r/o given fevers, non productive cough (07 Apr 2020 09:37)  Currently admitted to medicine with the primary diagnosis of Sepsis    ASSESSMENT & PLAN  71 y/o female with pmh of a-fib, c/o fever, non-productive cough, body aches and decreased appetite x 7 days. No known sick contacts. No diarrhea. No ABD pain.   Patient was found to be COVID +. She required intubated on 3/24/2020 for hypoxic respiratory failure. Extubated 4/5.     #Acute hypoxic respiratory failure due to COVID-19 +, requiring intubation on 3/24, extubated on 4/5, now on ventimask   -CXR: bilateral reticular infiltrates - stable   -Off pressors now, off sedation   -patient is now on ventimask - 15L   -Completed course of unasyn for possible aspiration PNA  -Completed course of Plaquenil, completed course of solumedrol  - S/p 1 dose of tocilizumab    #High lactate, resolved   - CT abdomen negative for mesenteric ischemia.     # Chronic A.fib with high rate A.fib   - patient is off amio & esmolol drips - now on PO amio & metoprolol 25 BID   - lovenox therapeutic - once patient has NG tube will switch to eliquis 2.5 BID (home dose)     #HTN   -continue metoprolol, norvasc   -can increase norvasc to 10 mg if BP increases     Diet: dysphagia diet   Lines: left IJ - will d/c   Radha present - will d/c   DVT prophylaxis: Lovenox therapeutic  Ambulation: bedrest   Dispo: downgrade to floor likely tomorrow     Ventilation: ventimask at 15L   Sedation: none  Pressors: none   Other drips: none   Lines: L IJ & a-line will be d/c     Procalcitonin: 0.05   Steroids: completed  Abx: completed unasyn

## 2020-04-07 NOTE — CHART NOTE - NSCHARTNOTEFT_GEN_A_CORE
As part of the communication team, I spoke w son Paulo [phone] and provided him a daily update.

## 2020-04-07 NOTE — PROGRESS NOTE ADULT - ASSESSMENT
IMPRESSION:  Acute Hypoxemic Respiratory Failure 2/2 COVID 19, resolved   ARDS  Sepsis present on admission  Septic shock resolved   H/O Afib on Eliquis    PLAN:    CNS: No depressants.  FU MS     HEENT: ET care ,oral care    PULMONARY:  HOB @ 45 degrees. Aspiration precautions. Wean O2 as tolerated     CARDIOVASCULAR: Avoid volume overload.  FU QTC.  Continue Rate and BP control      GI: GI prophylaxis. NG Feeding .      RENAL:  Follow up lytes.  Correct as needed.    INFECTIOUS DISEASE: Follow up cultures.      HEMATOLOGICAL:  DVT prophylaxis / Therapy.  On LMWH     ENDOCRINE:  Follow up FS.  Insulin protocol if needed.      MUSCULOSKELETAL: Bed chair psotion     Lines: TLC     Disposition: MICU monitoring     DC candido and Hernandez

## 2020-04-07 NOTE — PROGRESS NOTE ADULT - SUBJECTIVE AND OBJECTIVE BOX
Patient is a 70y old  Female who presents with a chief complaint of covid r/o given fevers, non productive cough (06 Apr 2020 10:36)        Over Night Events:  More awake.  Does not follow commands Off pressors         ROS:     All ROS are negative except HPI         PHYSICAL EXAM    ICU Vital Signs Last 24 Hrs  T(C): 36.2 (07 Apr 2020 08:00), Max: 36.2 (07 Apr 2020 08:00)  T(F): 97.1 (07 Apr 2020 08:00), Max: 97.1 (07 Apr 2020 08:00)  HR: 76 (07 Apr 2020 09:00) (64 - 162)  BP: --  BP(mean): --  ABP: 126/60 (07 Apr 2020 09:00) (94/62 - 152/64)  ABP(mean): 86 (07 Apr 2020 09:00) (62 - 96)  RR: 17 (07 Apr 2020 09:00) (13 - 36)  SpO2: 97% (07 Apr 2020 09:00) (91% - 100%)      CONSTITUTIONAL:   Ill appearing.  Well nourished.  NAD    ENT:   Airway patent,   Mouth with normal mucosa.   No thrush    EYES:   Pupils equal,   Round and reactive to light.    CARDIAC:   Normal rate,   Regular rhythm.    No edema      Vascular:  Normal systolic impulse  No Carotid bruits    RESPIRATORY:   No wheezing  Bilateral BS  Normal chest expansion  Not tachypneic,  No use of accessory muscles    GASTROINTESTINAL:  Abdomen soft,   Non-tender,   No guarding,   + BS    GENITOURINARY  normal genitalia for sex  no edema    MUSCULOSKELETAL:   Range of motion is not limited,  No clubbing, cyanosis    NEUROLOGICAL:   Alert   Does not follow commands     SKIN:   Skin normal color for race,   Warm and dry and intact.   No evidence of rash.    PSYCHIATRIC:    No apparent risk to self or others.    HEMATOLOGICAL:  No cervical  lymphadenopathy.  no inguinal lymphadenopathy      04-06-20 @ 07:01  -  04-07-20 @ 07:00  --------------------------------------------------------  IN:    amiodarone Infusion: 416.9 mL    esmolol Infusion: 427.3 mL  Total IN: 844.2 mL    OUT:    Indwelling Catheter - Urethral: 1308 mL  Total OUT: 1308 mL    Total NET: -463.8 mL      04-07-20 @ 07:01  -  04-07-20 @ 09:38  --------------------------------------------------------  IN:    amiodarone Infusion: 50.1 mL    esmolol Infusion: 29.4 mL  Total IN: 79.5 mL    OUT:    Indwelling Catheter - Urethral: 100 mL  Total OUT: 100 mL    Total NET: -20.5 mL          LABS:                            11.5   8.65  )-----------( 307      ( 07 Apr 2020 05:00 )             35.4                                               04-07    145  |  105  |  16  ----------------------------<  105<H>  3.1<L>   |  26  |  0.5<L>    Ca    8.5      07 Apr 2020 05:00  Mg     2.1     04-07    TPro  5.4<L>  /  Alb  3.0<L>  /  TBili  0.8  /  DBili  x   /  AST  72<H>  /  ALT  178<H>  /  AlkPhos  187<H>  04-06                                                                                           LIVER FUNCTIONS - ( 06 Apr 2020 05:45 )  Alb: 3.0 g/dL / Pro: 5.4 g/dL / ALK PHOS: 187 U/L / ALT: 178 U/L / AST: 72 U/L / GGT: x                                                                                                                                       MEDICATIONS  (STANDING):  aMIOdarone    Tablet 100 milliGRAM(s) Oral daily  aMIOdarone Infusion 1 mG/Min (33.3 mL/Hr) IV Continuous <Continuous>  aMIOdarone Infusion 0.5 mG/Min (16.7 mL/Hr) IV Continuous <Continuous>  amLODIPine   Tablet 5 milliGRAM(s) Oral daily  enoxaparin Injectable 65 milliGRAM(s) SubCutaneous every 12 hours  esMOLOL  Infusion 50 MICROgram(s)/kG/Min (19.5 mL/Hr) IV Continuous <Continuous>  pantoprazole   Suspension 40 milliGRAM(s) Oral daily  polyethylene glycol 3350 17 Gram(s) Oral daily  senna 2 Tablet(s) Oral at bedtime  thiamine 100 milliGRAM(s) Oral daily    MEDICATIONS  (PRN):  acetaminophen   Tablet .. 650 milliGRAM(s) Oral every 6 hours PRN Temp greater or equal to 38C (100.4F)      New X-rays reviewed:                                                                                  ECHO    CXR interpreted by me:  Bilateral infiltrates

## 2020-04-08 LAB
ALBUMIN SERPL ELPH-MCNC: 2.8 G/DL — LOW (ref 3.5–5.2)
ALP SERPL-CCNC: 132 U/L — HIGH (ref 30–115)
ALT FLD-CCNC: 104 U/L — HIGH (ref 0–41)
ANION GAP SERPL CALC-SCNC: 12 MMOL/L — SIGNIFICANT CHANGE UP (ref 7–14)
AST SERPL-CCNC: 38 U/L — SIGNIFICANT CHANGE UP (ref 0–41)
BILIRUB SERPL-MCNC: 0.5 MG/DL — SIGNIFICANT CHANGE UP (ref 0.2–1.2)
BUN SERPL-MCNC: 14 MG/DL — SIGNIFICANT CHANGE UP (ref 10–20)
CALCIUM SERPL-MCNC: 8.5 MG/DL — SIGNIFICANT CHANGE UP (ref 8.5–10.1)
CHLORIDE SERPL-SCNC: 105 MMOL/L — SIGNIFICANT CHANGE UP (ref 98–110)
CO2 SERPL-SCNC: 26 MMOL/L — SIGNIFICANT CHANGE UP (ref 17–32)
CREAT SERPL-MCNC: 0.5 MG/DL — LOW (ref 0.7–1.5)
GLUCOSE SERPL-MCNC: 92 MG/DL — SIGNIFICANT CHANGE UP (ref 70–99)
HCT VFR BLD CALC: 32.5 % — LOW (ref 37–47)
HGB BLD-MCNC: 10.9 G/DL — LOW (ref 12–16)
MCHC RBC-ENTMCNC: 30 PG — SIGNIFICANT CHANGE UP (ref 27–31)
MCHC RBC-ENTMCNC: 33.5 G/DL — SIGNIFICANT CHANGE UP (ref 32–37)
MCV RBC AUTO: 89.5 FL — SIGNIFICANT CHANGE UP (ref 81–99)
NRBC # BLD: 0 /100 WBCS — SIGNIFICANT CHANGE UP (ref 0–0)
PLATELET # BLD AUTO: 232 K/UL — SIGNIFICANT CHANGE UP (ref 130–400)
POTASSIUM SERPL-MCNC: 3.3 MMOL/L — LOW (ref 3.5–5)
POTASSIUM SERPL-SCNC: 3.3 MMOL/L — LOW (ref 3.5–5)
PROT SERPL-MCNC: 5 G/DL — LOW (ref 6–8)
RBC # BLD: 3.63 M/UL — LOW (ref 4.2–5.4)
RBC # FLD: 14.6 % — HIGH (ref 11.5–14.5)
SODIUM SERPL-SCNC: 143 MMOL/L — SIGNIFICANT CHANGE UP (ref 135–146)
WBC # BLD: 8.22 K/UL — SIGNIFICANT CHANGE UP (ref 4.8–10.8)
WBC # FLD AUTO: 8.22 K/UL — SIGNIFICANT CHANGE UP (ref 4.8–10.8)

## 2020-04-08 PROCEDURE — 71045 X-RAY EXAM CHEST 1 VIEW: CPT | Mod: 26

## 2020-04-08 RX ORDER — POTASSIUM CHLORIDE 20 MEQ
20 PACKET (EA) ORAL
Refills: 0 | Status: DISCONTINUED | OUTPATIENT
Start: 2020-04-08 | End: 2020-04-08

## 2020-04-08 RX ORDER — POTASSIUM CHLORIDE 20 MEQ
40 PACKET (EA) ORAL ONCE
Refills: 0 | Status: COMPLETED | OUTPATIENT
Start: 2020-04-08 | End: 2020-04-08

## 2020-04-08 RX ORDER — POTASSIUM CHLORIDE 20 MEQ
40 PACKET (EA) ORAL EVERY 4 HOURS
Refills: 0 | Status: DISCONTINUED | OUTPATIENT
Start: 2020-04-08 | End: 2020-04-08

## 2020-04-08 RX ADMIN — AMLODIPINE BESYLATE 5 MILLIGRAM(S): 2.5 TABLET ORAL at 04:44

## 2020-04-08 RX ADMIN — Medication 25 MILLIGRAM(S): at 17:01

## 2020-04-08 RX ADMIN — PANTOPRAZOLE SODIUM 40 MILLIGRAM(S): 20 TABLET, DELAYED RELEASE ORAL at 11:32

## 2020-04-08 RX ADMIN — ENOXAPARIN SODIUM 65 MILLIGRAM(S): 100 INJECTION SUBCUTANEOUS at 17:00

## 2020-04-08 RX ADMIN — ENOXAPARIN SODIUM 65 MILLIGRAM(S): 100 INJECTION SUBCUTANEOUS at 04:45

## 2020-04-08 RX ADMIN — Medication 100 MILLIGRAM(S): at 11:34

## 2020-04-08 RX ADMIN — AMIODARONE HYDROCHLORIDE 100 MILLIGRAM(S): 400 TABLET ORAL at 04:44

## 2020-04-08 RX ADMIN — Medication 25 MILLIGRAM(S): at 04:44

## 2020-04-08 RX ADMIN — Medication 40 MILLIEQUIVALENT(S): at 11:31

## 2020-04-08 RX ADMIN — SENNA PLUS 2 TABLET(S): 8.6 TABLET ORAL at 21:33

## 2020-04-08 NOTE — CHART NOTE - NSCHARTNOTEFT_GEN_A_CORE
As part of the communication team, I spoke w son Paulo [phone] and provided him a daily update.  Family would very much like to Face Time oc [place] if at all possible.

## 2020-04-08 NOTE — PHARMACOTHERAPY INTERVENTION NOTE - COMMENTS
d/w medical team to evaluate 3 KCl order  -Klor 40meq po x1, KCl 20meq IV q2h x3 doses, & KDur 40meq po q4h x2 doses  -keep Klor 40meq order, d/c others

## 2020-04-08 NOTE — PROGRESS NOTE ADULT - ASSESSMENT
IMPRESSION:  Acute Hypoxemic Respiratory Failure 2/2 COVID 19, resolved   ARDS  Sepsis present on admission  Septic shock resolved   H/O Afib on Eliquis    PLAN:    CNS: No depressants.       HEENT: ET care ,oral care    PULMONARY:  HOB @ 45 degrees. Aspiration precautions. Wean O2 as tolerated 35 % and then NC     CARDIOVASCULAR: Avoid volume overload.  FU QTC.  Continue Rate and BP control      GI: GI prophylaxis. Feeding as tolerated     RENAL:  Follow up lytes.  Correct as needed.    INFECTIOUS DISEASE: Follow up cultures.      HEMATOLOGICAL:  DVT prophylaxis / Therapy.  On LMWH      ENDOCRINE:  Follow up FS.  Insulin protocol if needed.      MUSCULOSKELETAL: Bed chair position     Lines: d/c TLC     Disposition: Possible downgrade afternoon

## 2020-04-08 NOTE — CHART NOTE - NSCHARTNOTEFT_GEN_A_CORE
ICU Transfer Note    Transfer from: MICU  Transfer to:  ( X ) Medicine       MICU COURSE:  69 y/o female with pmh of a-fib, c/o fever, non-productive cough, body aches and decreased appetite x 7 days. No known sick contacts. No diarrhea. No ABD pain. Patient was found to be COVID+. She required intubated on 3/24/2020 for hypoxic respiratory failure. Extubated 4/5. Was on nonrebreather - then was able to be titrated down to a ventimask on 35%. Can attempt to try nasal cannula.     During her ICU stay, patient went into Afib w/ RVR. Started on amiodarone drip and switched to PO amiodarone. HR has been controlled.     Patient's  was also admitted in the hospital but unfortunately passed away. Patient is still not aware of this.     ASSESSMENT & PLAN:   69 y/o female with pmh of a-fib, c/o fever, non-productive cough, body aches and decreased appetite x 7 days. No known sick contacts. No diarrhea. No ABD pain. Patient was found to be COVID +. She required intubated on 3/24/2020 for hypoxic respiratory failure. Extubated 4/5.     #Acute hypoxic respiratory failure due to COVID-19 +, requiring intubation on 3/24, extubated on 4/5, now on ventimask   -CXR: bilateral reticular infiltrates - stable   -Off pressors now, off sedation   -patient is now on ventimask - 15L, 35%    -Completed course of unasyn for possible aspiration PNA  -Completed course of Plaquenil, completed course of solumedrol  - S/p 1 dose of tocilizumab    #High lactate, resolved   - CT abdomen negative for mesenteric ischemia.     # Chronic A.fib with high rate A.fib, controlled   - patient is off amio & esmolol drips - now on PO amio & metoprolol 25 BID   - lovenox therapeutic - once patient is stable for downgrade, will switch to eliquis 2.5 BID (home dose)     #HTN   -continue metoprolol, norvasc   -can increase norvasc to 10 mg if BP increases     Diet: dysphagia diet (can recall for official S&S consult)   DVT prophylaxis: Lovenox therapeutic  Ambulation: bed to chair mood     Ventilation: ventimask at 15L, 35% - switch to nasal cannula   Sedation: none  Pressors: none   Other drips: none   Lines: peripheral     Procalcitonin: 0.05   Steroids: completed  Abx: completed unasyn     For Follow-Up:  - monitor oxygen saturation - can attempt to try nasal cannula   - switch from lovenox to eliquis when patient is tolerating feeds     Vital Signs Last 24 Hrs  T(C): 37.3 (08 Apr 2020 12:00), Max: 37.3 (08 Apr 2020 12:00)  T(F): 99.1 (08 Apr 2020 12:00), Max: 99.1 (08 Apr 2020 12:00)  HR: 76 (08 Apr 2020 14:00) (56 - 86)  BP: 151/63 (08 Apr 2020 14:00) (111/47 - 157/55)  BP(mean): 87 (08 Apr 2020 14:00) (65 - 104)  RR: 18 (08 Apr 2020 14:00) (12 - 38)  SpO2: 98% (08 Apr 2020 14:00) (93% - 100%)  I&O's Summary    07 Apr 2020 07:01  -  08 Apr 2020 07:00  --------------------------------------------------------  IN: 1212.9 mL / OUT: 1100 mL / NET: 112.9 mL        MEDICATIONS  (STANDING):  aMIOdarone    Tablet 100 milliGRAM(s) Oral daily  amLODIPine   Tablet 5 milliGRAM(s) Oral daily  enoxaparin Injectable 65 milliGRAM(s) SubCutaneous every 12 hours  metoprolol tartrate 25 milliGRAM(s) Oral two times a day  pantoprazole   Suspension 40 milliGRAM(s) Oral daily  polyethylene glycol 3350 17 Gram(s) Oral daily  senna 2 Tablet(s) Oral at bedtime  thiamine 100 milliGRAM(s) Oral daily    MEDICATIONS  (PRN):  acetaminophen   Tablet .. 650 milliGRAM(s) Oral every 6 hours PRN Temp greater or equal to 38C (100.4F)        LABS                                            10.9                  Neurophils% (auto):   x      (04-08 @ 04:30):    8.22 )-----------(232          Lymphocytes% (auto):  x                                             32.5                   Eosinphils% (auto):   x        Manual%: Neutrophils x    ; Lymphocytes x    ; Eosinophils x    ; Bands%: x    ; Blasts x                                    143    |  105    |  14                  Calcium: 8.5   / iCa: x      (04-08 @ 04:30)    ----------------------------<  92        Magnesium: x                                3.3     |  26     |  0.5              Phosphorous: x        TPro  5.0    /  Alb  2.8    /  TBili  0.5    /  DBili  x      /  AST  38     /  ALT  104    /  AlkPhos  132    08 Apr 2020 04:30

## 2020-04-08 NOTE — PROGRESS NOTE ADULT - SUBJECTIVE AND OBJECTIVE BOX
Patient is a 70y old  Female who presents with a chief complaint of covid r/o given fevers, non productive cough (07 Apr 2020 13:34)    Over Night Events:  No events overnight; Remains on venti mask 50 %    ROS:     All ROS are negative except HPI       PHYSICAL EXAM    ICU Vital Signs Last 24 Hrs  T(C): 36.1 (08 Apr 2020 06:00), Max: 36.3 (07 Apr 2020 11:00)  T(F): 96.9 (08 Apr 2020 06:00), Max: 97.4 (07 Apr 2020 11:00)  HR: 62 (08 Apr 2020 07:00) (56 - 86)  BP: 127/50 (08 Apr 2020 07:00) (117/47 - 157/55)  BP(mean): 71 (08 Apr 2020 07:00) (71 - 104)  ABP: 144/68 (07 Apr 2020 17:00) (126/60 - 150/68)  ABP(mean): 100 (07 Apr 2020 17:00) (86 - 100)  RR: 13 (08 Apr 2020 07:00) (12 - 38)  SpO2: 100% (08 Apr 2020 07:00) (93% - 100%)    CONSTITUTIONAL:   Ill appearing.   NAD    ENT:   Airway patent,   Mouth with normal mucosa.   No thrush    EYES:   Pupils equal,   Round and reactive to light.    CARDIAC:   Normal rate,   Regular rhythm.    No edema      Vascular:  Normal systolic impulse  No Carotid bruits    RESPIRATORY:   No wheezing  Bilateral BS  Normal chest expansion  Not tachypneic,  No use of accessory muscles    GASTROINTESTINAL:  Abdomen soft,   Non-tender,   No guarding,   + BS    GENITOURINARY  normal genitalia for sex  no edema    MUSCULOSKELETAL:   Range of motion is not limited,  No clubbing, cyanosis    NEUROLOGICAL:   Awake alert; Barely responsive to commands     SKIN:   Skin normal color for race,   Warm and dry and intact.   No evidence of rash.    PSYCHIATRIC:   Catatonic       04-07-20 @ 07:01  -  04-08-20 @ 07:00  --------------------------------------------------------  IN:    amiodarone Infusion: 83.5 mL    esmolol Infusion: 29.4 mL    Oral Fluid: 1100 mL  Total IN: 1212.9 mL    OUT:    Indwelling Catheter - Urethral: 700 mL    Voided: 400 mL  Total OUT: 1100 mL    Total NET: 112.9 mL      LABS:                            10.9   8.22  )-----------( 232      ( 08 Apr 2020 04:30 )             32.5                                                04-08    143  |  105  |  14  ----------------------------<  92  3.3<L>   |  26  |  0.5<L>    Ca    8.5      08 Apr 2020 04:30  Mg     2.1     04-07    TPro  5.0<L>  /  Alb  2.8<L>  /  TBili  0.5  /  DBili  x   /  AST  38  /  ALT  104<H>  /  AlkPhos  132<H>  04-08      LIVER FUNCTIONS - ( 08 Apr 2020 04:30 )  Alb: 2.8 g/dL / Pro: 5.0 g/dL / ALK PHOS: 132 U/L / ALT: 104 U/L / AST: 38 U/L / GGT: x                                                                        MEDICATIONS  (STANDING):  aMIOdarone    Tablet 100 milliGRAM(s) Oral daily  amLODIPine   Tablet 5 milliGRAM(s) Oral daily  enoxaparin Injectable 65 milliGRAM(s) SubCutaneous every 12 hours  metoprolol tartrate 25 milliGRAM(s) Oral two times a day  pantoprazole   Suspension 40 milliGRAM(s) Oral daily  polyethylene glycol 3350 17 Gram(s) Oral daily  potassium chloride   Powder 40 milliEquivalent(s) Oral once  potassium chloride  20 mEq/100 mL IVPB 20 milliEquivalent(s) IV Intermittent every 2 hours  senna 2 Tablet(s) Oral at bedtime  thiamine 100 milliGRAM(s) Oral daily    MEDICATIONS  (PRN):  acetaminophen   Tablet .. 650 milliGRAM(s) Oral every 6 hours PRN Temp greater or equal to 38C (100.4F)      New X-rays reviewed:                                                                                  ECHO    CXR interpreted by me:  Left IJ TLC in left IJ; Bilateral opacities

## 2020-04-09 LAB
ALBUMIN SERPL ELPH-MCNC: 3.2 G/DL — LOW (ref 3.5–5.2)
ALP SERPL-CCNC: 129 U/L — HIGH (ref 30–115)
ALT FLD-CCNC: 95 U/L — HIGH (ref 0–41)
ANION GAP SERPL CALC-SCNC: 14 MMOL/L — SIGNIFICANT CHANGE UP (ref 7–14)
AST SERPL-CCNC: 37 U/L — SIGNIFICANT CHANGE UP (ref 0–41)
BASOPHILS # BLD AUTO: 0.02 K/UL — SIGNIFICANT CHANGE UP (ref 0–0.2)
BASOPHILS NFR BLD AUTO: 0.2 % — SIGNIFICANT CHANGE UP (ref 0–1)
BILIRUB SERPL-MCNC: 0.6 MG/DL — SIGNIFICANT CHANGE UP (ref 0.2–1.2)
BUN SERPL-MCNC: 11 MG/DL — SIGNIFICANT CHANGE UP (ref 10–20)
CALCIUM SERPL-MCNC: 8.9 MG/DL — SIGNIFICANT CHANGE UP (ref 8.5–10.1)
CHLORIDE SERPL-SCNC: 103 MMOL/L — SIGNIFICANT CHANGE UP (ref 98–110)
CO2 SERPL-SCNC: 23 MMOL/L — SIGNIFICANT CHANGE UP (ref 17–32)
CREAT SERPL-MCNC: <0.5 MG/DL — LOW (ref 0.7–1.5)
EOSINOPHIL # BLD AUTO: 0.17 K/UL — SIGNIFICANT CHANGE UP (ref 0–0.7)
EOSINOPHIL NFR BLD AUTO: 1.9 % — SIGNIFICANT CHANGE UP (ref 0–8)
GLUCOSE SERPL-MCNC: 92 MG/DL — SIGNIFICANT CHANGE UP (ref 70–99)
HCT VFR BLD CALC: 35.8 % — LOW (ref 37–47)
HGB BLD-MCNC: 11.7 G/DL — LOW (ref 12–16)
IMM GRANULOCYTES NFR BLD AUTO: 0.7 % — HIGH (ref 0.1–0.3)
LYMPHOCYTES # BLD AUTO: 0.38 K/UL — LOW (ref 1.2–3.4)
LYMPHOCYTES # BLD AUTO: 4.2 % — LOW (ref 20.5–51.1)
MCHC RBC-ENTMCNC: 29.2 PG — SIGNIFICANT CHANGE UP (ref 27–31)
MCHC RBC-ENTMCNC: 32.7 G/DL — SIGNIFICANT CHANGE UP (ref 32–37)
MCV RBC AUTO: 89.3 FL — SIGNIFICANT CHANGE UP (ref 81–99)
MONOCYTES # BLD AUTO: 0.71 K/UL — HIGH (ref 0.1–0.6)
MONOCYTES NFR BLD AUTO: 7.9 % — SIGNIFICANT CHANGE UP (ref 1.7–9.3)
NEUTROPHILS # BLD AUTO: 7.63 K/UL — HIGH (ref 1.4–6.5)
NEUTROPHILS NFR BLD AUTO: 85.1 % — HIGH (ref 42.2–75.2)
NRBC # BLD: 0 /100 WBCS — SIGNIFICANT CHANGE UP (ref 0–0)
PLATELET # BLD AUTO: 290 K/UL — SIGNIFICANT CHANGE UP (ref 130–400)
POTASSIUM SERPL-MCNC: 3.6 MMOL/L — SIGNIFICANT CHANGE UP (ref 3.5–5)
POTASSIUM SERPL-SCNC: 3.6 MMOL/L — SIGNIFICANT CHANGE UP (ref 3.5–5)
PROT SERPL-MCNC: 5.7 G/DL — LOW (ref 6–8)
RBC # BLD: 4.01 M/UL — LOW (ref 4.2–5.4)
RBC # FLD: 14.6 % — HIGH (ref 11.5–14.5)
SODIUM SERPL-SCNC: 140 MMOL/L — SIGNIFICANT CHANGE UP (ref 135–146)
WBC # BLD: 8.97 K/UL — SIGNIFICANT CHANGE UP (ref 4.8–10.8)
WBC # FLD AUTO: 8.97 K/UL — SIGNIFICANT CHANGE UP (ref 4.8–10.8)

## 2020-04-09 RX ADMIN — POLYETHYLENE GLYCOL 3350 17 GRAM(S): 17 POWDER, FOR SOLUTION ORAL at 16:15

## 2020-04-09 RX ADMIN — ENOXAPARIN SODIUM 65 MILLIGRAM(S): 100 INJECTION SUBCUTANEOUS at 06:07

## 2020-04-09 RX ADMIN — SENNA PLUS 2 TABLET(S): 8.6 TABLET ORAL at 21:33

## 2020-04-09 RX ADMIN — Medication 25 MILLIGRAM(S): at 06:07

## 2020-04-09 RX ADMIN — AMIODARONE HYDROCHLORIDE 100 MILLIGRAM(S): 400 TABLET ORAL at 06:07

## 2020-04-09 RX ADMIN — Medication 100 MILLIGRAM(S): at 16:14

## 2020-04-09 RX ADMIN — AMLODIPINE BESYLATE 5 MILLIGRAM(S): 2.5 TABLET ORAL at 06:07

## 2020-04-09 RX ADMIN — Medication 25 MILLIGRAM(S): at 17:58

## 2020-04-09 RX ADMIN — ENOXAPARIN SODIUM 65 MILLIGRAM(S): 100 INJECTION SUBCUTANEOUS at 17:57

## 2020-04-09 NOTE — PROGRESS NOTE ADULT - SUBJECTIVE AND OBJECTIVE BOX
CLIFTON LYNN 70y Female      Patient is a 70y old  Female who presents with a chief complaint of covid r/o given fevers, non productive cough (08 Apr 2020 08:02)        INTERVAL HPI/OVERNIGHT EVENTS: No acute events overnight. Patient was seen and evaluated at the bedside. The patient denies pain. Vitals stable. Patient denies fever/chills, chest pain, headaches, nausea/vomiting, and diarrhea/constipation.      PHYSICAL EXAM:  GENERAL: NAD  HEAD:  Normocephalic  EYES:  conjunctiva and sclera clear  ENMT: Moist mucous membranes  NECK: Supple  NERVOUS SYSTEM:  Somnolent   CHEST/LUNG: Good air exchange bilaterally, no wheeze  HEART: Regular rate and rhythm        Vital Signs Last 24 Hrs  T(C): 36.2 (09 Apr 2020 06:00), Max: 37.3 (08 Apr 2020 12:00)  T(F): 97.1 (09 Apr 2020 06:00), Max: 99.1 (08 Apr 2020 12:00)  HR: 73 (09 Apr 2020 06:00) (66 - 86)  BP: 133/68 (09 Apr 2020 06:00) (127/55 - 151/63)  BP(mean): 89 (08 Apr 2020 16:00) (83 - 97)  RR: 18 (09 Apr 2020 06:00) (14 - 35)  SpO2: 96% (09 Apr 2020 09:13) (95% - 99%)      Consultant(s) Notes Reviewed:  [X] YES  [ ] NO  Care Discussed with Consultants/Other Providers [X] YES  [ ] NO  Imaging Personally Reviewed:  [X] YES  [ ] NO      LABS:                        11.7   8.97  )-----------( 290      ( 09 Apr 2020 05:23 )             35.8     04-09    140  |  103  |  11  ----------------------------<  92  3.6   |  23  |  <0.5<L>    Ca    8.9      09 Apr 2020 05:23    TPro  5.7<L>  /  Alb  3.2<L>  /  TBili  0.6  /  DBili  x   /  AST  37  /  ALT  95<H>  /  AlkPhos  129<H>  04-09          CAPILLARY BLOOD GLUCOSE

## 2020-04-09 NOTE — CHART NOTE - NSCHARTNOTEFT_GEN_A_CORE
Registered Dietitian Follow-Up    ***Scroll to the bottom for RD recommendation***    Patient Profile Reviewed                           Yes [x]   No []  Nutrition History Previously Obtained        Yes []  No [x]          PERTINENT SUBJECTIVE INFORMATION (LATEST AS OF TODAY):  Pt started on pureed nectar, poor PO intake thus far, to continue to monitor      PERTINENT MEDICAL INFORMATIONS:  Pt no longer considered critical, downgraded to medicine team. Extubated 4/5 now on ventimask, to attempt NC, COVID19 +. Rec/s SLP.          DIET ORDER:  puree nectar, DASH/TLC          ANTHROPOMETRICS:  - Ht.  160cm  - Wt.  (3/19): 67.3kg  (3/24): 65.5kg  (3/29): 67.3kg  (4/3): 67.7kg  (4/7) 89.5kg - likely bed scale error  - BMI. 25.6  - IBW.        PERTINENT LAB DATA:  4/9 H/H 11.7/35.8, Cr <0.5, alk phos 129, ALT 95  PERTINENT MEDS:   enoxaparin, metoprolol, amiodarone, amlodipine, thiamine, miralax, senna, protonix      PHYSICAL FINDINGS  - APPEARANCE:        extubated, on ventimask, generalized 1+ edema  - GI FUNCTION:        LBM 4/8, diarrhea  - TUBES:                       - ORAL/MOUTH:       pureed nectar, pending SLP, 1:1 feeds  - SKIN:                        Ecchymosis        NUTRITION REQUIREMENTS  WEIGHT USED:                          ABW is 67.3kg (from 3/19), Ht: 160cm  ESTIMATED ENERGY NEEDS:       CONTINUE [  ]      ADJUST [ x ]  - extubated 4/5    ESTIMATED ENERGY NEEDS:         7131-4349 kcal/day (MSJ of 1329 x 1.2-1.3)  ESTIMATED PROTEIN NEEDS:        67-78 g/day (1.0-1.2 g/kg of ABW)  ESTIMATED FLUID NEEDS:             fluids per LIP  CURRENT NUTRIENT NEEDS:    <25%          [x  ] PREVIOUS NUTRITION DIAGNOSIS:   (1) inadequate protein-energy intake            [ x ] ONGOING        [  ] RESOLVED            PATIENT INTERVENTION:    [ x ] ORAL        [] EN/TF     GOAL/EXPECTED OUTCOME:     pt to consume >25% of meals within 4 days  INDICATOR/MONITORING:       RD to monitor diet order, energy intake, body composition, nutrition focused physical findings, glucose and renal profile  NUTRITION INTERVENTION:        coordination of care, meals and snacks, vit/min supplement, nutrition-related medication management      RECS: (1) Pt ordered pureed diet with nectar liquids, pending rec/s SLP. (2) Continue 1:1 feeds. (3). Consider starting pt on daily MVI. Consider d/c of bowel regimen given pt now has diarrhea. Will reassess need for PO supplement at f/u if pt continues with inadequate intake.    Pt at risk f/u 4 days

## 2020-04-09 NOTE — PROGRESS NOTE ADULT - ASSESSMENT
69 y/o female with pmh of a-fib, c/o fever, non-productive cough, body aches and decreased appetite x 7 days. No known sick contacts. No diarrhea. No ABD pain. Patient was found to be COVID +. She required intubated on 3/24/2020 for hypoxic respiratory failure. Extubated 4/5.     # Acute hypoxic respiratory failure due to COVID-19 +  - required intubation on 3/24, extubated on 4/5, now on Ventimask 35% FiO2. Attempt NC at 6L  - CXR: bilateral reticular infiltrates - stable   - Completed courses of Unasyn for possible aspiration PNA, Plaquenil, and tocilizumab  - Obtain ABG to evaluate for hypercarbia as patient is somnolent    # Chronic A.fib with high rate A.fib, controlled   - Oral amiodarone and metoprolol 25 BID   - Lovenox therapeutic - once patient is stable for downgrade, will switch to Eliquis 2.5 BID (home dose)     # HTN   - continue metoprolol, Norvasc     Diet: dysphagia diet (can recall for official S&S consult)   DVT prophylaxis: Lovenox therapeutic  Ambulation: bed to chair

## 2020-04-10 LAB
ALBUMIN SERPL ELPH-MCNC: 3.5 G/DL — SIGNIFICANT CHANGE UP (ref 3.5–5.2)
ALP SERPL-CCNC: 129 U/L — HIGH (ref 30–115)
ALT FLD-CCNC: 79 U/L — HIGH (ref 0–41)
ANION GAP SERPL CALC-SCNC: 13 MMOL/L — SIGNIFICANT CHANGE UP (ref 7–14)
AST SERPL-CCNC: 28 U/L — SIGNIFICANT CHANGE UP (ref 0–41)
BASOPHILS # BLD AUTO: 0.02 K/UL — SIGNIFICANT CHANGE UP (ref 0–0.2)
BASOPHILS NFR BLD AUTO: 0.3 % — SIGNIFICANT CHANGE UP (ref 0–1)
BILIRUB SERPL-MCNC: 0.5 MG/DL — SIGNIFICANT CHANGE UP (ref 0.2–1.2)
BUN SERPL-MCNC: 8 MG/DL — LOW (ref 10–20)
CALCIUM SERPL-MCNC: 9 MG/DL — SIGNIFICANT CHANGE UP (ref 8.5–10.1)
CHLORIDE SERPL-SCNC: 103 MMOL/L — SIGNIFICANT CHANGE UP (ref 98–110)
CO2 SERPL-SCNC: 26 MMOL/L — SIGNIFICANT CHANGE UP (ref 17–32)
CREAT SERPL-MCNC: 0.5 MG/DL — LOW (ref 0.7–1.5)
EOSINOPHIL # BLD AUTO: 0.26 K/UL — SIGNIFICANT CHANGE UP (ref 0–0.7)
EOSINOPHIL NFR BLD AUTO: 3.4 % — SIGNIFICANT CHANGE UP (ref 0–8)
GLUCOSE SERPL-MCNC: 88 MG/DL — SIGNIFICANT CHANGE UP (ref 70–99)
HCT VFR BLD CALC: 36.9 % — LOW (ref 37–47)
HGB BLD-MCNC: 12 G/DL — SIGNIFICANT CHANGE UP (ref 12–16)
IMM GRANULOCYTES NFR BLD AUTO: 0.5 % — HIGH (ref 0.1–0.3)
LYMPHOCYTES # BLD AUTO: 0.45 K/UL — LOW (ref 1.2–3.4)
LYMPHOCYTES # BLD AUTO: 5.9 % — LOW (ref 20.5–51.1)
MAGNESIUM SERPL-MCNC: 1.9 MG/DL — SIGNIFICANT CHANGE UP (ref 1.8–2.4)
MCHC RBC-ENTMCNC: 29 PG — SIGNIFICANT CHANGE UP (ref 27–31)
MCHC RBC-ENTMCNC: 32.5 G/DL — SIGNIFICANT CHANGE UP (ref 32–37)
MCV RBC AUTO: 89.1 FL — SIGNIFICANT CHANGE UP (ref 81–99)
MONOCYTES # BLD AUTO: 0.68 K/UL — HIGH (ref 0.1–0.6)
MONOCYTES NFR BLD AUTO: 9 % — SIGNIFICANT CHANGE UP (ref 1.7–9.3)
NEUTROPHILS # BLD AUTO: 6.13 K/UL — SIGNIFICANT CHANGE UP (ref 1.4–6.5)
NEUTROPHILS NFR BLD AUTO: 80.9 % — HIGH (ref 42.2–75.2)
NRBC # BLD: 0 /100 WBCS — SIGNIFICANT CHANGE UP (ref 0–0)
PLATELET # BLD AUTO: 297 K/UL — SIGNIFICANT CHANGE UP (ref 130–400)
POTASSIUM SERPL-MCNC: 3.2 MMOL/L — LOW (ref 3.5–5)
POTASSIUM SERPL-SCNC: 3.2 MMOL/L — LOW (ref 3.5–5)
PROT SERPL-MCNC: 6 G/DL — SIGNIFICANT CHANGE UP (ref 6–8)
RBC # BLD: 4.14 M/UL — LOW (ref 4.2–5.4)
RBC # FLD: 14.7 % — HIGH (ref 11.5–14.5)
SODIUM SERPL-SCNC: 142 MMOL/L — SIGNIFICANT CHANGE UP (ref 135–146)
WBC # BLD: 7.58 K/UL — SIGNIFICANT CHANGE UP (ref 4.8–10.8)
WBC # FLD AUTO: 7.58 K/UL — SIGNIFICANT CHANGE UP (ref 4.8–10.8)

## 2020-04-10 PROCEDURE — 99233 SBSQ HOSP IP/OBS HIGH 50: CPT

## 2020-04-10 RX ORDER — APIXABAN 2.5 MG/1
2.5 TABLET, FILM COATED ORAL EVERY 12 HOURS
Refills: 0 | Status: DISCONTINUED | OUTPATIENT
Start: 2020-04-10 | End: 2020-04-16

## 2020-04-10 RX ADMIN — Medication 25 MILLIGRAM(S): at 05:28

## 2020-04-10 RX ADMIN — AMLODIPINE BESYLATE 5 MILLIGRAM(S): 2.5 TABLET ORAL at 05:28

## 2020-04-10 RX ADMIN — Medication 25 MILLIGRAM(S): at 17:37

## 2020-04-10 RX ADMIN — ENOXAPARIN SODIUM 65 MILLIGRAM(S): 100 INJECTION SUBCUTANEOUS at 05:29

## 2020-04-10 RX ADMIN — AMIODARONE HYDROCHLORIDE 100 MILLIGRAM(S): 400 TABLET ORAL at 05:28

## 2020-04-10 RX ADMIN — PANTOPRAZOLE SODIUM 40 MILLIGRAM(S): 20 TABLET, DELAYED RELEASE ORAL at 12:23

## 2020-04-10 RX ADMIN — APIXABAN 2.5 MILLIGRAM(S): 2.5 TABLET, FILM COATED ORAL at 17:38

## 2020-04-10 RX ADMIN — Medication 100 MILLIGRAM(S): at 12:22

## 2020-04-10 NOTE — PHYSICAL THERAPY INITIAL EVALUATION ADULT - CRITERIA FOR SKILLED THERAPEUTIC INTERVENTIONS
predicted duration of therapy intervention/risk reduction/prevention/functional limitations in following categories/impairments found/rehab potential/therapy frequency

## 2020-04-10 NOTE — PROGRESS NOTE ADULT - SUBJECTIVE AND OBJECTIVE BOX
CLIFTON LYNN 70y Female      Patient is a 70y old  Female who presents with a chief complaint of covid r/o given fevers, non productive cough (10 Apr 2020 08:56)        INTERVAL HPI/OVERNIGHT EVENTS: No acute events overnight. Patient was seen and evaluated at the bedside. The patient denies pain. Vitals stable. Patient denies fever/chills, chest pain, shortness of breath, abdominal pain, headaches, nausea/vomiting, and diarrhea/constipation.      PHYSICAL EXAM:  GENERAL: NAD  HEAD:  Normocephalic  EYES:  conjunctiva and sclera clear  ENMT: Moist mucous membranes  NECK: Supple  NERVOUS SYSTEM:  Alert, awake  CHEST/LUNG: Good air exchange bilaterally, no wheeze  HEART: Regular rate and rhythm        Vital Signs Last 24 Hrs  T(C): 36.6 (10 Apr 2020 05:36), Max: 37 (09 Apr 2020 20:03)  T(F): 97.8 (10 Apr 2020 05:36), Max: 98.6 (09 Apr 2020 20:03)  HR: 62 (10 Apr 2020 05:36) (62 - 77)  BP: 127/69 (10 Apr 2020 05:36) (107/56 - 127/69)  BP(mean): --  RR: 18 (10 Apr 2020 05:36) (18 - 20)  SpO2: 98% (10 Apr 2020 05:36) (96% - 98%)      Consultant(s) Notes Reviewed:  [X] YES  [ ] NO  Care Discussed with Consultants/Other Providers [X] YES  [ ] NO  Imaging Personally Reviewed:  [X] YES  [ ] NO      LABS:                        12.0   7.58  )-----------( 297      ( 10 Apr 2020 08:33 )             36.9     04-10    142  |  103  |  8<L>  ----------------------------<  88  3.2<L>   |  26  |  0.5<L>    Ca    9.0      10 Apr 2020 08:33  Mg     1.9     04-10    TPro  6.0  /  Alb  3.5  /  TBili  0.5  /  DBili  x   /  AST  28  /  ALT  79<H>  /  AlkPhos  129<H>  04-10          CAPILLARY BLOOD GLUCOSE

## 2020-04-10 NOTE — PROGRESS NOTE ADULT - ASSESSMENT
71 y/o female with pmh of a-fib, c/o fever, non-productive cough, body aches and decreased appetite x 7 days. No known sick contacts. No diarrhea. No ABD pain. Patient was found to be COVID +. She required intubated on 3/24/2020 for hypoxic respiratory failure. Extubated 4/5.     # Acute hypoxic respiratory failure due to COVID-19   - required intubation on 3/24, extubated on 4/5. Currently on 4L NC satting 98%. Titrate down as tolerated.   - CXR: bilateral reticular infiltrates, stable   - Completed courses of Unasyn for possible aspiration PNA, Plaquenil, and tocilizumab    # Chronic A.fib with high rate A.fib, controlled   - Oral amiodarone and metoprolol 25 BID   - Eliquis 2.5 twice daily     # HTN   - continue metoprolol, Norvasc     Diet: dysphagia diet (can recall for official S&S consult)   DVT prophylaxis: Lovenox therapeutic  Ambulation: bed to chair

## 2020-04-10 NOTE — PHYSICAL THERAPY INITIAL EVALUATION ADULT - IMPAIRMENTS FOUND, PT EVAL
Medication:    Outpatient Medications Marked as Taking for the 3/30/20 encounter (Refill) with Micheal Chung MD   Medication Sig Dispense Refill   • lisdexamfetamine (VYVANSE) 60 MG capsule Take 1 capsule by mouth every morning. 30 capsule 0       Message to Prescriber:     [x] Pharmacy has been verified.    [] Patient completely out of medication (*Route encounter as high priority if checked)    [] Patient informed refill request can take up to 3 business days to be processed    Patient currently has follow up appointment scheduled      
Vyvanse scripts routed to Dr. Chung.  
gross motor/gait, locomotion, and balance/muscle strength/aerobic capacity/endurance

## 2020-04-10 NOTE — PROGRESS NOTE ADULT - ATTENDING COMMENTS
patient seen and examined. at the bed side. patient is not in distress but very debilitated and still needing  oxygen via nasal cannula 3 to 5 liter.   Needing assistance for feeding also.   Need to monitor further.   Pt can be tomorrow. expected she will be able to participate.   [employer] to cut on oxygen. now 3 to 5 liter of oxygen she is using. Not ready for discharge

## 2020-04-11 LAB
ALBUMIN SERPL ELPH-MCNC: 3.2 G/DL — LOW (ref 3.5–5.2)
ALP SERPL-CCNC: 107 U/L — SIGNIFICANT CHANGE UP (ref 30–115)
ALT FLD-CCNC: 63 U/L — HIGH (ref 0–41)
ANION GAP SERPL CALC-SCNC: 13 MMOL/L — SIGNIFICANT CHANGE UP (ref 7–14)
AST SERPL-CCNC: 22 U/L — SIGNIFICANT CHANGE UP (ref 0–41)
BASOPHILS # BLD AUTO: 0.02 K/UL — SIGNIFICANT CHANGE UP (ref 0–0.2)
BASOPHILS NFR BLD AUTO: 0.3 % — SIGNIFICANT CHANGE UP (ref 0–1)
BILIRUB SERPL-MCNC: 0.5 MG/DL — SIGNIFICANT CHANGE UP (ref 0.2–1.2)
BUN SERPL-MCNC: 10 MG/DL — SIGNIFICANT CHANGE UP (ref 10–20)
CALCIUM SERPL-MCNC: 8.8 MG/DL — SIGNIFICANT CHANGE UP (ref 8.5–10.1)
CHLORIDE SERPL-SCNC: 103 MMOL/L — SIGNIFICANT CHANGE UP (ref 98–110)
CO2 SERPL-SCNC: 27 MMOL/L — SIGNIFICANT CHANGE UP (ref 17–32)
CREAT SERPL-MCNC: 0.5 MG/DL — LOW (ref 0.7–1.5)
CRP SERPL-MCNC: 0.24 MG/DL — SIGNIFICANT CHANGE UP (ref 0–0.4)
CRP SERPL-MCNC: 0.29 MG/DL — SIGNIFICANT CHANGE UP (ref 0–0.4)
EOSINOPHIL # BLD AUTO: 0.27 K/UL — SIGNIFICANT CHANGE UP (ref 0–0.7)
EOSINOPHIL NFR BLD AUTO: 3.4 % — SIGNIFICANT CHANGE UP (ref 0–8)
GLUCOSE BLDC GLUCOMTR-MCNC: 111 MG/DL — HIGH (ref 70–99)
GLUCOSE BLDC GLUCOMTR-MCNC: 88 MG/DL — SIGNIFICANT CHANGE UP (ref 70–99)
GLUCOSE SERPL-MCNC: 102 MG/DL — HIGH (ref 70–99)
HCT VFR BLD CALC: 35 % — LOW (ref 37–47)
HGB BLD-MCNC: 11.5 G/DL — LOW (ref 12–16)
IMM GRANULOCYTES NFR BLD AUTO: 0.5 % — HIGH (ref 0.1–0.3)
LYMPHOCYTES # BLD AUTO: 0.44 K/UL — LOW (ref 1.2–3.4)
LYMPHOCYTES # BLD AUTO: 5.6 % — LOW (ref 20.5–51.1)
MAGNESIUM SERPL-MCNC: 1.9 MG/DL — SIGNIFICANT CHANGE UP (ref 1.8–2.4)
MCHC RBC-ENTMCNC: 29.1 PG — SIGNIFICANT CHANGE UP (ref 27–31)
MCHC RBC-ENTMCNC: 32.9 G/DL — SIGNIFICANT CHANGE UP (ref 32–37)
MCV RBC AUTO: 88.6 FL — SIGNIFICANT CHANGE UP (ref 81–99)
MONOCYTES # BLD AUTO: 0.7 K/UL — HIGH (ref 0.1–0.6)
MONOCYTES NFR BLD AUTO: 8.9 % — SIGNIFICANT CHANGE UP (ref 1.7–9.3)
NEUTROPHILS # BLD AUTO: 6.42 K/UL — SIGNIFICANT CHANGE UP (ref 1.4–6.5)
NEUTROPHILS NFR BLD AUTO: 81.3 % — HIGH (ref 42.2–75.2)
NRBC # BLD: 0 /100 WBCS — SIGNIFICANT CHANGE UP (ref 0–0)
PLATELET # BLD AUTO: 289 K/UL — SIGNIFICANT CHANGE UP (ref 130–400)
POTASSIUM SERPL-MCNC: 3 MMOL/L — LOW (ref 3.5–5)
POTASSIUM SERPL-SCNC: 3 MMOL/L — LOW (ref 3.5–5)
PROCALCITONIN SERPL-MCNC: 0.03 NG/ML — SIGNIFICANT CHANGE UP (ref 0.02–0.1)
PROT SERPL-MCNC: 5.5 G/DL — LOW (ref 6–8)
RBC # BLD: 3.95 M/UL — LOW (ref 4.2–5.4)
RBC # FLD: 14.8 % — HIGH (ref 11.5–14.5)
SODIUM SERPL-SCNC: 143 MMOL/L — SIGNIFICANT CHANGE UP (ref 135–146)
WBC # BLD: 7.89 K/UL — SIGNIFICANT CHANGE UP (ref 4.8–10.8)
WBC # FLD AUTO: 7.89 K/UL — SIGNIFICANT CHANGE UP (ref 4.8–10.8)

## 2020-04-11 RX ORDER — POTASSIUM CHLORIDE 20 MEQ
40 PACKET (EA) ORAL EVERY 4 HOURS
Refills: 0 | Status: COMPLETED | OUTPATIENT
Start: 2020-04-11 | End: 2020-04-11

## 2020-04-11 RX ADMIN — Medication 40 MILLIEQUIVALENT(S): at 21:44

## 2020-04-11 RX ADMIN — APIXABAN 2.5 MILLIGRAM(S): 2.5 TABLET, FILM COATED ORAL at 06:24

## 2020-04-11 RX ADMIN — Medication 40 MILLIEQUIVALENT(S): at 12:44

## 2020-04-11 RX ADMIN — Medication 100 MILLIGRAM(S): at 12:08

## 2020-04-11 RX ADMIN — AMIODARONE HYDROCHLORIDE 100 MILLIGRAM(S): 400 TABLET ORAL at 06:24

## 2020-04-11 RX ADMIN — Medication 25 MILLIGRAM(S): at 18:14

## 2020-04-11 RX ADMIN — Medication 40 MILLIEQUIVALENT(S): at 18:14

## 2020-04-11 RX ADMIN — Medication 25 MILLIGRAM(S): at 06:24

## 2020-04-11 RX ADMIN — POLYETHYLENE GLYCOL 3350 17 GRAM(S): 17 POWDER, FOR SOLUTION ORAL at 12:07

## 2020-04-11 RX ADMIN — APIXABAN 2.5 MILLIGRAM(S): 2.5 TABLET, FILM COATED ORAL at 18:13

## 2020-04-11 RX ADMIN — PANTOPRAZOLE SODIUM 40 MILLIGRAM(S): 20 TABLET, DELAYED RELEASE ORAL at 12:04

## 2020-04-11 RX ADMIN — AMLODIPINE BESYLATE 5 MILLIGRAM(S): 2.5 TABLET ORAL at 06:24

## 2020-04-11 NOTE — PROGRESS NOTE ADULT - ASSESSMENT
71 y/o female with pmh of a-fib, c/o fever, non-productive cough, body aches and decreased appetite x 7 days. No known sick contacts. No diarrhea. No ABD pain. Patient was found to be COVID +. She required intubated on 3/24/2020 for hypoxic respiratory failure. Extubated 4/5.     # Acute hypoxic respiratory failure due to COVID-19   - required intubation on 3/24, extubated on 4/5. Currently on 4L NC satting 98%. Titrate down as tolerated.   - Completed courses of Unasyn for possible aspiration PNA, Plaquenil, solumedrol, and tocilizumab  - Dispo may includes SNF v [facility] after she amblates  - Inflammatory markers have been stable     # Chronic A.fib with high rate A.fib, controlled   - Oral amiodarone and metoprolol 25 BID   - Eliquis 2.5 twice daily     # HTN   - continue metoprolol, Norvasc     Diet: dysphagia diet (can recall for official S&S consult)   DVT prophylaxis: Lovenox therapeutic  Ambulation: bed to chair

## 2020-04-11 NOTE — PROGRESS NOTE ADULT - SUBJECTIVE AND OBJECTIVE BOX
CLIFTON LYNN 70y Female      Patient is a 70y old  Female who presents with a chief complaint of covid r/o given fevers, non productive cough (10 Apr 2020 08:56)        INTERVAL HPI/OVERNIGHT EVENTS: No acute events overnight. Patient was seen and evaluated at the bedside. The patient denies pain. Vitals stable. Patient denies fever/chills, chest pain, shortness of breath, abdominal pain, headaches, nausea/vomiting, and diarrhea/constipation.      PHYSICAL EXAM:  GENERAL: NAD  HEAD:  Normocephalic  EYES:  conjunctiva and sclera clear  ENMT: Moist mucous membranes  NECK: Supple  NERVOUS SYSTEM:  Alert, awake  CHEST/LUNG: Good air exchange bilaterally, no wheeze  HEART: Regular rate and rhythm        Vital Signs Last 24 Hrs  T(C): 36.2 (11 Apr 2020 06:23), Max: 36.4 (10 Apr 2020 14:04)  T(F): 97.2 (11 Apr 2020 06:23), Max: 97.6 (10 Apr 2020 14:04)  HR: 66 (11 Apr 2020 06:23) (66 - 89)  BP: 123/55 (11 Apr 2020 06:23) (115/56 - 134/63)  BP(mean): --  RR: 18 (11 Apr 2020 06:23) (18 - 18)  SpO2: 98% (11 Apr 2020 06:23) (97% - 98%)      Consultant(s) Notes Reviewed:  [X] YES  [ ] NO  Care Discussed with Consultants/Other Providers [X] YES  [ ] NO  Imaging Personally Reviewed:  [X] YES  [ ] NO      LABS:                        11.5   7.89  )-----------( 289      ( 11 Apr 2020 06:34 )             35.0     04-11    143  |  103  |  10  ----------------------------<  102<H>  3.0<L>   |  27  |  0.5<L>    Ca    8.8      11 Apr 2020 06:34  Mg     1.9     04-11    TPro  5.5<L>  /  Alb  3.2<L>  /  TBili  0.5  /  DBili  x   /  AST  22  /  ALT  63<H>  /  AlkPhos  107  04-11          CAPILLARY BLOOD GLUCOSE

## 2020-04-12 LAB
ALBUMIN SERPL ELPH-MCNC: 3.2 G/DL — LOW (ref 3.5–5.2)
ALP SERPL-CCNC: 102 U/L — SIGNIFICANT CHANGE UP (ref 30–115)
ALT FLD-CCNC: 52 U/L — HIGH (ref 0–41)
ANION GAP SERPL CALC-SCNC: 10 MMOL/L — SIGNIFICANT CHANGE UP (ref 7–14)
AST SERPL-CCNC: 22 U/L — SIGNIFICANT CHANGE UP (ref 0–41)
BASOPHILS # BLD AUTO: 0.03 K/UL — SIGNIFICANT CHANGE UP (ref 0–0.2)
BASOPHILS NFR BLD AUTO: 0.3 % — SIGNIFICANT CHANGE UP (ref 0–1)
BILIRUB SERPL-MCNC: 0.4 MG/DL — SIGNIFICANT CHANGE UP (ref 0.2–1.2)
BUN SERPL-MCNC: 10 MG/DL — SIGNIFICANT CHANGE UP (ref 10–20)
CALCIUM SERPL-MCNC: 8.9 MG/DL — SIGNIFICANT CHANGE UP (ref 8.5–10.1)
CHLORIDE SERPL-SCNC: 110 MMOL/L — SIGNIFICANT CHANGE UP (ref 98–110)
CO2 SERPL-SCNC: 23 MMOL/L — SIGNIFICANT CHANGE UP (ref 17–32)
CREAT SERPL-MCNC: 0.5 MG/DL — LOW (ref 0.7–1.5)
EOSINOPHIL # BLD AUTO: 0.41 K/UL — SIGNIFICANT CHANGE UP (ref 0–0.7)
EOSINOPHIL NFR BLD AUTO: 4.7 % — SIGNIFICANT CHANGE UP (ref 0–8)
GLUCOSE BLDC GLUCOMTR-MCNC: 91 MG/DL — SIGNIFICANT CHANGE UP (ref 70–99)
GLUCOSE SERPL-MCNC: 92 MG/DL — SIGNIFICANT CHANGE UP (ref 70–99)
HCT VFR BLD CALC: 34.7 % — LOW (ref 37–47)
HGB BLD-MCNC: 11 G/DL — LOW (ref 12–16)
IMM GRANULOCYTES NFR BLD AUTO: 0.6 % — HIGH (ref 0.1–0.3)
LYMPHOCYTES # BLD AUTO: 0.62 K/UL — LOW (ref 1.2–3.4)
LYMPHOCYTES # BLD AUTO: 7.1 % — LOW (ref 20.5–51.1)
MAGNESIUM SERPL-MCNC: 1.9 MG/DL — SIGNIFICANT CHANGE UP (ref 1.8–2.4)
MCHC RBC-ENTMCNC: 28.6 PG — SIGNIFICANT CHANGE UP (ref 27–31)
MCHC RBC-ENTMCNC: 31.7 G/DL — LOW (ref 32–37)
MCV RBC AUTO: 90.1 FL — SIGNIFICANT CHANGE UP (ref 81–99)
MONOCYTES # BLD AUTO: 0.87 K/UL — HIGH (ref 0.1–0.6)
MONOCYTES NFR BLD AUTO: 9.9 % — HIGH (ref 1.7–9.3)
NEUTROPHILS # BLD AUTO: 6.77 K/UL — HIGH (ref 1.4–6.5)
NEUTROPHILS NFR BLD AUTO: 77.4 % — HIGH (ref 42.2–75.2)
NRBC # BLD: 0 /100 WBCS — SIGNIFICANT CHANGE UP (ref 0–0)
PLATELET # BLD AUTO: 297 K/UL — SIGNIFICANT CHANGE UP (ref 130–400)
POTASSIUM SERPL-MCNC: 4.7 MMOL/L — SIGNIFICANT CHANGE UP (ref 3.5–5)
POTASSIUM SERPL-SCNC: 4.7 MMOL/L — SIGNIFICANT CHANGE UP (ref 3.5–5)
PROCALCITONIN SERPL-MCNC: <0.02 NG/ML — SIGNIFICANT CHANGE UP (ref 0.02–0.1)
PROT SERPL-MCNC: 5.6 G/DL — LOW (ref 6–8)
RBC # BLD: 3.85 M/UL — LOW (ref 4.2–5.4)
RBC # FLD: 15.3 % — HIGH (ref 11.5–14.5)
SODIUM SERPL-SCNC: 143 MMOL/L — SIGNIFICANT CHANGE UP (ref 135–146)
WBC # BLD: 8.75 K/UL — SIGNIFICANT CHANGE UP (ref 4.8–10.8)
WBC # FLD AUTO: 8.75 K/UL — SIGNIFICANT CHANGE UP (ref 4.8–10.8)

## 2020-04-12 PROCEDURE — 99233 SBSQ HOSP IP/OBS HIGH 50: CPT

## 2020-04-12 RX ADMIN — Medication 25 MILLIGRAM(S): at 18:31

## 2020-04-12 RX ADMIN — PANTOPRAZOLE SODIUM 40 MILLIGRAM(S): 20 TABLET, DELAYED RELEASE ORAL at 13:37

## 2020-04-12 RX ADMIN — AMIODARONE HYDROCHLORIDE 100 MILLIGRAM(S): 400 TABLET ORAL at 05:46

## 2020-04-12 RX ADMIN — APIXABAN 2.5 MILLIGRAM(S): 2.5 TABLET, FILM COATED ORAL at 05:46

## 2020-04-12 RX ADMIN — AMLODIPINE BESYLATE 5 MILLIGRAM(S): 2.5 TABLET ORAL at 05:46

## 2020-04-12 RX ADMIN — Medication 25 MILLIGRAM(S): at 05:46

## 2020-04-12 RX ADMIN — Medication 100 MILLIGRAM(S): at 13:37

## 2020-04-12 RX ADMIN — POLYETHYLENE GLYCOL 3350 17 GRAM(S): 17 POWDER, FOR SOLUTION ORAL at 13:36

## 2020-04-12 RX ADMIN — APIXABAN 2.5 MILLIGRAM(S): 2.5 TABLET, FILM COATED ORAL at 18:31

## 2020-04-12 NOTE — PROGRESS NOTE ADULT - ASSESSMENT
70F PMHx AFib, HTN here with acute hypoxic resp failure; fever due to covid s/p intubation 3/24, now extubated.    #Acute hypoxic resp failure, fever; due to covid  resolved, satting well on ra  s/p course plaquenil, steroids, tociluzimab  cxr noted  tentative plan to transfer [facility]  discharge planning to [facility]  #AFib  eliquis  lopressor 25 bid  amio 100  #HTN  norvasc 5  #DVT ppx  eliquis    #Progress Note Handoff:  Pending (specify):  Consults_________, Tests________, Test Results_______, Other____d/c planning_____  Family discussion:d/w pt at bedside re: treatment plan, primary dx  Disposition: Home___/SNF___/Other________/Unknown at this time___x_____

## 2020-04-12 NOTE — PROGRESS NOTE ADULT - SUBJECTIVE AND OBJECTIVE BOX
INTERVAL HPI/OVERNIGHT EVENTS:    SUBJECTIVE: Patient seen and examined at bedside.     no cp, sob, abd pain, fever  no sob, orthopnea, pnd, cough    OBJECTIVE:    VITAL SIGNS:  Vital Signs Last 24 Hrs  T(C): 36.2 (12 Apr 2020 05:43), Max: 36.4 (11 Apr 2020 12:48)  T(F): 97.1 (12 Apr 2020 05:43), Max: 97.5 (11 Apr 2020 12:48)  HR: 87 (12 Apr 2020 05:43) (70 - 87)  BP: 123/61 (12 Apr 2020 05:43) (117/58 - 128/62)  BP(mean): --  RR: 18 (12 Apr 2020 05:43) (18 - 20)  SpO2: 98% (11 Apr 2020 20:02) (98% - 98%)      PHYSICAL EXAM:    General: NAD  HEENT: NC/AT; PERRL, clear conjunctiva  Neck: supple  Respiratory: decreased bs at bases  Cardiovascular: +S1/S2; RRR  Abdomen: soft, NT/ND; +BS x4  Extremities: WWP, 2+ peripheral pulses b/l; no LE edema  Skin: normal color and turgor; no rash  Neurological:    MEDICATIONS:  MEDICATIONS  (STANDING):  aMIOdarone    Tablet 100 milliGRAM(s) Oral daily  amLODIPine   Tablet 5 milliGRAM(s) Oral daily  apixaban 2.5 milliGRAM(s) Oral every 12 hours  metoprolol tartrate 25 milliGRAM(s) Oral two times a day  pantoprazole   Suspension 40 milliGRAM(s) Oral daily  polyethylene glycol 3350 17 Gram(s) Oral daily  senna 2 Tablet(s) Oral at bedtime  thiamine 100 milliGRAM(s) Oral daily    MEDICATIONS  (PRN):  acetaminophen   Tablet .. 650 milliGRAM(s) Oral every 6 hours PRN Temp greater or equal to 38C (100.4F)      ALLERGIES:  Allergies    Originally Entered as [Unknown] reaction to [green pepper] (Unknown)  Originally Entered as [Unknown] reaction to [pepper] (Unknown)  sulfa drugs (Unknown)    Intolerances        LABS:                        11.0   8.75  )-----------( 297      ( 12 Apr 2020 05:44 )             34.7     Hemoglobin: 11.0 g/dL (04-12 @ 05:44)  Hemoglobin: 11.5 g/dL (04-11 @ 06:34)  Hemoglobin: 12.0 g/dL (04-10 @ 08:33)  Hemoglobin: 11.7 g/dL (04-09 @ 05:23)  Hemoglobin: 10.9 g/dL (04-08 @ 04:30)    CBC Full  -  ( 12 Apr 2020 05:44 )  WBC Count : 8.75 K/uL  RBC Count : 3.85 M/uL  Hemoglobin : 11.0 g/dL  Hematocrit : 34.7 %  Platelet Count - Automated : 297 K/uL  Mean Cell Volume : 90.1 fL  Mean Cell Hemoglobin : 28.6 pg  Mean Cell Hemoglobin Concentration : 31.7 g/dL  Auto Neutrophil # : 6.77 K/uL  Auto Lymphocyte # : 0.62 K/uL  Auto Monocyte # : 0.87 K/uL  Auto Eosinophil # : 0.41 K/uL  Auto Basophil # : 0.03 K/uL  Auto Neutrophil % : 77.4 %  Auto Lymphocyte % : 7.1 %  Auto Monocyte % : 9.9 %  Auto Eosinophil % : 4.7 %  Auto Basophil % : 0.3 %    04-12    143  |  110  |  10  ----------------------------<  92  4.7   |  23  |  0.5<L>    Ca    8.9      12 Apr 2020 05:44  Mg     1.9     04-12    TPro  5.6<L>  /  Alb  3.2<L>  /  TBili  0.4  /  DBili  x   /  AST  22  /  ALT  52<H>  /  AlkPhos  102  04-12    Creatinine Trend: 0.5<--, 0.5<--, 0.5<--, <0.5<--, 0.5<--, 0.5<--  LIVER FUNCTIONS - ( 12 Apr 2020 05:44 )  Alb: 3.2 g/dL / Pro: 5.6 g/dL / ALK PHOS: 102 U/L / ALT: 52 U/L / AST: 22 U/L / GGT: x               hs Troponin:              CSF:                      EKG:   MICROBIOLOGY:    IMAGING:      Labs, imaging, EKG personally reviewed    RADIOLOGY & ADDITIONAL TESTS: Reviewed.

## 2020-04-13 LAB
ALBUMIN SERPL ELPH-MCNC: 3.1 G/DL — LOW (ref 3.5–5.2)
ALP SERPL-CCNC: 107 U/L — SIGNIFICANT CHANGE UP (ref 30–115)
ALT FLD-CCNC: 45 U/L — HIGH (ref 0–41)
ANION GAP SERPL CALC-SCNC: 11 MMOL/L — SIGNIFICANT CHANGE UP (ref 7–14)
AST SERPL-CCNC: 22 U/L — SIGNIFICANT CHANGE UP (ref 0–41)
BASOPHILS # BLD AUTO: 0.04 K/UL — SIGNIFICANT CHANGE UP (ref 0–0.2)
BASOPHILS NFR BLD AUTO: 0.5 % — SIGNIFICANT CHANGE UP (ref 0–1)
BILIRUB SERPL-MCNC: 0.4 MG/DL — SIGNIFICANT CHANGE UP (ref 0.2–1.2)
BUN SERPL-MCNC: 8 MG/DL — LOW (ref 10–20)
CALCIUM SERPL-MCNC: 9.1 MG/DL — SIGNIFICANT CHANGE UP (ref 8.5–10.1)
CHLORIDE SERPL-SCNC: 105 MMOL/L — SIGNIFICANT CHANGE UP (ref 98–110)
CO2 SERPL-SCNC: 25 MMOL/L — SIGNIFICANT CHANGE UP (ref 17–32)
CREAT SERPL-MCNC: 0.5 MG/DL — LOW (ref 0.7–1.5)
EOSINOPHIL # BLD AUTO: 0.3 K/UL — SIGNIFICANT CHANGE UP (ref 0–0.7)
EOSINOPHIL NFR BLD AUTO: 3.7 % — SIGNIFICANT CHANGE UP (ref 0–8)
GLUCOSE SERPL-MCNC: 93 MG/DL — SIGNIFICANT CHANGE UP (ref 70–99)
HCT VFR BLD CALC: 33.6 % — LOW (ref 37–47)
HGB BLD-MCNC: 11.3 G/DL — LOW (ref 12–16)
IMM GRANULOCYTES NFR BLD AUTO: 0.5 % — HIGH (ref 0.1–0.3)
LYMPHOCYTES # BLD AUTO: 0.42 K/UL — LOW (ref 1.2–3.4)
LYMPHOCYTES # BLD AUTO: 5.1 % — LOW (ref 20.5–51.1)
MAGNESIUM SERPL-MCNC: 1.9 MG/DL — SIGNIFICANT CHANGE UP (ref 1.8–2.4)
MCHC RBC-ENTMCNC: 30.5 PG — SIGNIFICANT CHANGE UP (ref 27–31)
MCHC RBC-ENTMCNC: 33.6 G/DL — SIGNIFICANT CHANGE UP (ref 32–37)
MCV RBC AUTO: 90.6 FL — SIGNIFICANT CHANGE UP (ref 81–99)
MONOCYTES # BLD AUTO: 0.75 K/UL — HIGH (ref 0.1–0.6)
MONOCYTES NFR BLD AUTO: 9.2 % — SIGNIFICANT CHANGE UP (ref 1.7–9.3)
NEUTROPHILS # BLD AUTO: 6.63 K/UL — HIGH (ref 1.4–6.5)
NEUTROPHILS NFR BLD AUTO: 81 % — HIGH (ref 42.2–75.2)
NRBC # BLD: 0 /100 WBCS — SIGNIFICANT CHANGE UP (ref 0–0)
PLATELET # BLD AUTO: 276 K/UL — SIGNIFICANT CHANGE UP (ref 130–400)
POTASSIUM SERPL-MCNC: 4.2 MMOL/L — SIGNIFICANT CHANGE UP (ref 3.5–5)
POTASSIUM SERPL-SCNC: 4.2 MMOL/L — SIGNIFICANT CHANGE UP (ref 3.5–5)
PROCALCITONIN SERPL-MCNC: 0.03 NG/ML — SIGNIFICANT CHANGE UP (ref 0.02–0.1)
PROT SERPL-MCNC: 5.7 G/DL — LOW (ref 6–8)
RBC # BLD: 3.71 M/UL — LOW (ref 4.2–5.4)
RBC # FLD: 15.6 % — HIGH (ref 11.5–14.5)
SODIUM SERPL-SCNC: 141 MMOL/L — SIGNIFICANT CHANGE UP (ref 135–146)
WBC # BLD: 8.18 K/UL — SIGNIFICANT CHANGE UP (ref 4.8–10.8)
WBC # FLD AUTO: 8.18 K/UL — SIGNIFICANT CHANGE UP (ref 4.8–10.8)

## 2020-04-13 RX ORDER — AMLODIPINE BESYLATE 2.5 MG/1
1 TABLET ORAL
Qty: 0 | Refills: 0 | DISCHARGE
Start: 2020-04-13

## 2020-04-13 RX ORDER — POLYETHYLENE GLYCOL 3350 17 G/17G
17 POWDER, FOR SOLUTION ORAL
Qty: 0 | Refills: 0 | DISCHARGE
Start: 2020-04-13

## 2020-04-13 RX ORDER — LANOLIN ALCOHOL/MO/W.PET/CERES
5 CREAM (GRAM) TOPICAL AT BEDTIME
Refills: 0 | Status: DISCONTINUED | OUTPATIENT
Start: 2020-04-13 | End: 2020-04-16

## 2020-04-13 RX ORDER — SENNA PLUS 8.6 MG/1
2 TABLET ORAL
Qty: 0 | Refills: 0 | DISCHARGE
Start: 2020-04-13

## 2020-04-13 RX ORDER — AMIODARONE HYDROCHLORIDE 400 MG/1
0.5 TABLET ORAL
Qty: 0 | Refills: 0 | DISCHARGE
Start: 2020-04-13

## 2020-04-13 RX ADMIN — Medication 100 MILLIGRAM(S): at 11:26

## 2020-04-13 RX ADMIN — Medication 25 MILLIGRAM(S): at 16:02

## 2020-04-13 RX ADMIN — PANTOPRAZOLE SODIUM 40 MILLIGRAM(S): 20 TABLET, DELAYED RELEASE ORAL at 11:26

## 2020-04-13 RX ADMIN — APIXABAN 2.5 MILLIGRAM(S): 2.5 TABLET, FILM COATED ORAL at 06:08

## 2020-04-13 RX ADMIN — Medication 5 MILLIGRAM(S): at 22:38

## 2020-04-13 RX ADMIN — AMLODIPINE BESYLATE 5 MILLIGRAM(S): 2.5 TABLET ORAL at 06:08

## 2020-04-13 RX ADMIN — Medication 25 MILLIGRAM(S): at 06:08

## 2020-04-13 RX ADMIN — POLYETHYLENE GLYCOL 3350 17 GRAM(S): 17 POWDER, FOR SOLUTION ORAL at 11:26

## 2020-04-13 RX ADMIN — SENNA PLUS 2 TABLET(S): 8.6 TABLET ORAL at 21:37

## 2020-04-13 RX ADMIN — APIXABAN 2.5 MILLIGRAM(S): 2.5 TABLET, FILM COATED ORAL at 16:02

## 2020-04-13 RX ADMIN — AMIODARONE HYDROCHLORIDE 100 MILLIGRAM(S): 400 TABLET ORAL at 06:08

## 2020-04-13 NOTE — CHART NOTE - NSCHARTNOTEFT_GEN_A_CORE
Registered Dietitian Follow-Up     Patient Profile Reviewed                           Yes [x]   No []     Nutrition History Previously Obtained        Yes []  No [x]       Pertinent Subjective Information: Unable to reach pt or emergency contacts. Per RN, pt has "ok" appetite with varied documented po intake of 25%-80%.      Pertinent Medical Interventions: Acute hypoxic respiratory failure due to COVID-19 resolved- currently on 2L NC satting 97%. Inflammatory markers have been stable.       Diet order: puree nectar, DASH/TLC     Anthropometrics:  - Ht. 160 cm   - Wt. no new wts  3/19): 67.3kg  (3/24): 65.5kg  (3/29): 67.3kg  (4/3): 67.7kg  (4/7) 89.5kg - likely bed scale error  - BMI 25.6 (using wt of 65.5 kg 3/24)  - IBW 52 kg        Pertinent Lab Data: 4/13: RBC- 3.71, H/H- 11.3/33.6, BUN-8, cr- 0.5     Pertinent Meds: Eliquis, metoprolol, amiodarone, amlodipine, thiamine, miralax, senna, protonix     Physical Findings:  - Appearance: generalized 1+ edema  - GI function: no GI issues noted; LBM 4/12 (per RN)  - Oral/Mouth cavity: pureed nectar, pending SLP, 1:1 feeds  - Skin: WDL     Nutrition Requirements (from previous RD f/u 4/9)  Weight Used: ABW is 67.3kg (from 3/19), Ht: 160cm     Estimated Energy Needs    Continue [x]  Adjust []  0985-1609 kcal/day (MSJ of 1329 x 1.2-1.3)     Estimated Protein Needs    Continue [x]  Adjust []   67-78 g/day (1.0-1.2 g/kg of ABW)    Estimated Fluid Needs    Continue [x]  Adjust []  1 ml/kcal or per LIP      Nutrient Intake: barely meeting needs         [x] Previous Nutrition Diagnosis:  inadequate protein-energy intake              [x] Ongoing          [] Resolved       Nutrition Intervention meals and snacks, coordination of care, vitamin/mineral supplement      Goal/Expected Outcome: In 3 days, pt to consume 50%-75% of meals.      Indicator/Monitoring:  RD to monitor diet order, energy intake, body composition, nutrition focused physical findings, glucose and renal profile    Recommendations: 1. Consider SLP eval for appropriate food/diet texture. 2. Add MVI daily. 3. Will assess need for PO supplement upon SLP recs or if po intake is continuously inadequate.

## 2020-04-13 NOTE — PROGRESS NOTE ADULT - SUBJECTIVE AND OBJECTIVE BOX
CLIFTON LYNN  70y  [facility]  HPI:  71 y/o female with pmh of a-fib, c/o fever, non-productive cough, body aches and decreased appetite x 7 days. No known sick contacts. No diarrhea. No ABD pain. No H/A, no neck pain. No dysuria/frequency/urgency. Today with dyspnea at rest. No hemoptysis.    Patient is a 70y old  Female who presents with a chief complaint of covid r/o given fevers, non productive cough (13 Apr 2020 09:54)      INTERVAL HPI/OVERNIGHT EVENTS: comfortable, anxious about her  being home without help, reports intermittant cough and fatigue, unable to ambulate independently, has now functional urinary incontinence, on 2L/min via NC    T(C): 36.2 (04-13-20 @ 05:36), Max: 36.4 (04-12-20 @ 14:14)  HR: 85 (04-13-20 @ 05:36) (70 - 85)  BP: 116/56 (04-13-20 @ 05:36) (107/56 - 137/58)  RR: 18 (04-13-20 @ 06:22) (16 - 18)  SpO2: 94% (04-13-20 @ 09:00) (94% - 97%)  Wt(kg): --Vital Signs Last 24 Hrs  T(C): 36.2 (13 Apr 2020 05:36), Max: 36.4 (12 Apr 2020 14:14)  T(F): 97.2 (13 Apr 2020 05:36), Max: 97.5 (12 Apr 2020 14:14)  HR: 85 (13 Apr 2020 05:36) (70 - 85)  BP: 116/56 (13 Apr 2020 05:36) (107/56 - 137/58)  BP(mean): --  RR: 18 (13 Apr 2020 06:22) (16 - 18)  SpO2: 94% (13 Apr 2020 09:00) (94% - 97%)        LABS:                            11.3   8.18  )-----------( 276      ( 13 Apr 2020 08:09 )             33.6   04-13    141  |  105  |  8<L>  ----------------------------<  93  4.2   |  25  |  0.5<L>    Ca    9.1      13 Apr 2020 08:09  Mg     1.9     04-13    TPro  5.7<L>  /  Alb  3.1<L>  /  TBili  0.4  /  DBili  x   /  AST  22  /  ALT  45<H>  /  AlkPhos  107  04-13    C-Reactive Protein, Serum (04.11.20 @ 06:34)    C-Reactive Protein, Serum: 0.24 mg/dL    Procalcitonin, Serum (04.12.20 @ 05:44)    Procalcitonin, Serum: 0.03:     D-Dimer Assay, Quantitative (03.28.20 @ 04:30)    D-Dimer Assay, Quantitative: 1563: Manufacturers recommended Cut off for VTE is 230 ng/ml D-DU ng/mL DDU    < from: Xray Chest 1 View- PORTABLE-Routine (04.08.20 @ 03:53) >  Impression:      Unchanged bilateral opacities.   Redemonstration of left central venous catheter with tip in the region of the distal left IJ      < end of copied text >      Inpatient Medications:  acetaminophen   Tablet .. 650 milliGRAM(s) Oral every 6 hours PRN  aMIOdarone    Tablet 100 milliGRAM(s) Oral daily  amLODIPine   Tablet 5 milliGRAM(s) Oral daily  apixaban 2.5 milliGRAM(s) Oral every 12 hours  metoprolol tartrate 25 milliGRAM(s) Oral two times a day  pantoprazole   Suspension 40 milliGRAM(s) Oral daily  polyethylene glycol 3350 17 Gram(s) Oral daily  senna 2 Tablet(s) Oral at bedtime  thiamine 100 milliGRAM(s) Oral daily

## 2020-04-13 NOTE — PROGRESS NOTE ADULT - SUBJECTIVE AND OBJECTIVE BOX
CLIFTON LYNN 70y Female      Patient is a 70y old  Female who presents with a chief complaint of covid r/o given fevers, non productive cough (12 Apr 2020 10:05)        INTERVAL HPI/OVERNIGHT EVENTS: No acute events overnight. Patient was seen and evaluated at the bedside. The patient denies pain. Vitals stable. Patient denies fever/chills, chest pain, shortness of breath, abdominal pain, headaches, nausea/vomiting, and diarrhea/constipation. Very weak       PHYSICAL EXAM:  GENERAL: NAD  HEAD:  Normocephalic  EYES:  conjunctiva and sclera clear  ENMT: Moist mucous membranes  NECK: Supple  NERVOUS SYSTEM:  Alert, awake  CHEST/LUNG: Good air exchange bilaterally, no wheeze  HEART: Regular rate and rhythm        Vital Signs Last 24 Hrs  T(C): 36.2 (13 Apr 2020 05:36), Max: 36.4 (12 Apr 2020 14:14)  T(F): 97.2 (13 Apr 2020 05:36), Max: 97.5 (12 Apr 2020 14:14)  HR: 85 (13 Apr 2020 05:36) (70 - 85)  BP: 116/56 (13 Apr 2020 05:36) (107/56 - 137/58)  RR: 18 (13 Apr 2020 06:22) (16 - 18)  SpO2: 97% (13 Apr 2020 06:22) (89% - 97%)      Consultant(s) Notes Reviewed:  [X] YES  [ ] NO  Care Discussed with Consultants/Other Providers [X] YES  [ ] NO  Imaging Personally Reviewed:  [X] YES  [ ] NO      LABS:                        11.3   8.18  )-----------( 276      ( 13 Apr 2020 08:09 )             33.6     04-12    143  |  110  |  10  ----------------------------<  92  4.7   |  23  |  0.5<L>    Ca    8.9      12 Apr 2020 05:44  Mg     1.9     04-12    TPro  5.6<L>  /  Alb  3.2<L>  /  TBili  0.4  /  DBili  x   /  AST  22  /  ALT  52<H>  /  AlkPhos  102  04-12          CAPILLARY BLOOD GLUCOSE      POCT Blood Glucose.: 91 mg/dL (12 Apr 2020 16:48)

## 2020-04-13 NOTE — PROGRESS NOTE ADULT - ASSESSMENT
70F PMHx AFib, HTN here with acute hypoxic resp failure; fever due to covid s/p intubation 3/24, now extubated.    #Acute hypoxic resp failure, fever; due to covid  resolved, was placed on 2L/min, tria off oxygen supplementation initiated this am  s/p course plaquenil, steroids, tociluzimab  cxr noted  needs PT, seen initially on 4/10  may not be a good candidate for transfer [facility] as she requres at least 1 person assist with ambulation  discharge planning to snf fro sub-acute rehab    AFib on eliquis  c/w lopressor 25 bid, amiodarone 100 mg, would check TFT    HTN on norvasc 5mg     DVT ppx  eliquis    case managemnt for d/c planning to SNF

## 2020-04-13 NOTE — PROGRESS NOTE ADULT - ASSESSMENT
69 y/o female with pmh of a-fib, c/o fever, non-productive cough, body aches and decreased appetite x 7 days. No known sick contacts. No diarrhea. No ABD pain. Patient was found to be COVID +. She required intubated on 3/24/2020 for hypoxic respiratory failure. Extubated 4/5.     # Acute hypoxic respiratory failure due to COVID-19   - required intubation on 3/24, extubated on 4/5. Currently on 2L NC satting 97%. Titrate down as tolerated.   - Completed courses of Unasyn for possible aspiration PNA, Plaquenil, solumedrol, and tocilizumab  - Dispo may includes SNF v [facility] after she ambulates Needs intensive PT as she is very debilitated.   - Inflammatory markers have been stable    # Chronic A.fib with high rate A.fib, controlled   - Oral amiodarone and metoprolol 25 BID   - Eliquis 2.5 twice daily     # HTN   - continue metoprolol, Norvasc     Diet: dysphagia diet   DVT prophylaxis: Eliquis   Ambulation: bed to chair

## 2020-04-14 LAB
ALBUMIN SERPL ELPH-MCNC: 3.2 G/DL — LOW (ref 3.5–5.2)
ALP SERPL-CCNC: 112 U/L — SIGNIFICANT CHANGE UP (ref 30–115)
ALT FLD-CCNC: 40 U/L — SIGNIFICANT CHANGE UP (ref 0–41)
ANION GAP SERPL CALC-SCNC: 15 MMOL/L — HIGH (ref 7–14)
AST SERPL-CCNC: 17 U/L — SIGNIFICANT CHANGE UP (ref 0–41)
BASOPHILS # BLD AUTO: 0.04 K/UL — SIGNIFICANT CHANGE UP (ref 0–0.2)
BASOPHILS NFR BLD AUTO: 0.3 % — SIGNIFICANT CHANGE UP (ref 0–1)
BILIRUB SERPL-MCNC: 0.5 MG/DL — SIGNIFICANT CHANGE UP (ref 0.2–1.2)
BUN SERPL-MCNC: 9 MG/DL — LOW (ref 10–20)
CALCIUM SERPL-MCNC: 9.2 MG/DL — SIGNIFICANT CHANGE UP (ref 8.5–10.1)
CHLORIDE SERPL-SCNC: 101 MMOL/L — SIGNIFICANT CHANGE UP (ref 98–110)
CO2 SERPL-SCNC: 24 MMOL/L — SIGNIFICANT CHANGE UP (ref 17–32)
CREAT SERPL-MCNC: 0.6 MG/DL — LOW (ref 0.7–1.5)
EOSINOPHIL # BLD AUTO: 0.17 K/UL — SIGNIFICANT CHANGE UP (ref 0–0.7)
EOSINOPHIL NFR BLD AUTO: 1.3 % — SIGNIFICANT CHANGE UP (ref 0–8)
GLUCOSE SERPL-MCNC: 104 MG/DL — HIGH (ref 70–99)
HCT VFR BLD CALC: 33.8 % — LOW (ref 37–47)
HGB BLD-MCNC: 11.4 G/DL — LOW (ref 12–16)
IMM GRANULOCYTES NFR BLD AUTO: 0.7 % — HIGH (ref 0.1–0.3)
LYMPHOCYTES # BLD AUTO: 0.39 K/UL — LOW (ref 1.2–3.4)
LYMPHOCYTES # BLD AUTO: 2.9 % — LOW (ref 20.5–51.1)
MAGNESIUM SERPL-MCNC: 1.9 MG/DL — SIGNIFICANT CHANGE UP (ref 1.8–2.4)
MCHC RBC-ENTMCNC: 30.5 PG — SIGNIFICANT CHANGE UP (ref 27–31)
MCHC RBC-ENTMCNC: 33.7 G/DL — SIGNIFICANT CHANGE UP (ref 32–37)
MCV RBC AUTO: 90.4 FL — SIGNIFICANT CHANGE UP (ref 81–99)
MONOCYTES # BLD AUTO: 1.04 K/UL — HIGH (ref 0.1–0.6)
MONOCYTES NFR BLD AUTO: 7.7 % — SIGNIFICANT CHANGE UP (ref 1.7–9.3)
NEUTROPHILS # BLD AUTO: 11.75 K/UL — HIGH (ref 1.4–6.5)
NEUTROPHILS NFR BLD AUTO: 87.1 % — HIGH (ref 42.2–75.2)
NRBC # BLD: 0 /100 WBCS — SIGNIFICANT CHANGE UP (ref 0–0)
PLATELET # BLD AUTO: 290 K/UL — SIGNIFICANT CHANGE UP (ref 130–400)
POTASSIUM SERPL-MCNC: 3.7 MMOL/L — SIGNIFICANT CHANGE UP (ref 3.5–5)
POTASSIUM SERPL-SCNC: 3.7 MMOL/L — SIGNIFICANT CHANGE UP (ref 3.5–5)
PROCALCITONIN SERPL-MCNC: 0.04 NG/ML — SIGNIFICANT CHANGE UP (ref 0.02–0.1)
PROT SERPL-MCNC: 5.9 G/DL — LOW (ref 6–8)
RBC # BLD: 3.74 M/UL — LOW (ref 4.2–5.4)
RBC # FLD: 15.7 % — HIGH (ref 11.5–14.5)
SODIUM SERPL-SCNC: 140 MMOL/L — SIGNIFICANT CHANGE UP (ref 135–146)
WBC # BLD: 13.49 K/UL — HIGH (ref 4.8–10.8)
WBC # FLD AUTO: 13.49 K/UL — HIGH (ref 4.8–10.8)

## 2020-04-14 PROCEDURE — 71045 X-RAY EXAM CHEST 1 VIEW: CPT | Mod: 26

## 2020-04-14 RX ORDER — ALPRAZOLAM 0.25 MG
0.25 TABLET ORAL
Refills: 0 | Status: DISCONTINUED | OUTPATIENT
Start: 2020-04-14 | End: 2020-04-16

## 2020-04-14 RX ADMIN — PANTOPRAZOLE SODIUM 40 MILLIGRAM(S): 20 TABLET, DELAYED RELEASE ORAL at 11:57

## 2020-04-14 RX ADMIN — Medication 25 MILLIGRAM(S): at 05:31

## 2020-04-14 RX ADMIN — Medication 100 MILLIGRAM(S): at 11:56

## 2020-04-14 RX ADMIN — APIXABAN 2.5 MILLIGRAM(S): 2.5 TABLET, FILM COATED ORAL at 05:31

## 2020-04-14 RX ADMIN — APIXABAN 2.5 MILLIGRAM(S): 2.5 TABLET, FILM COATED ORAL at 17:43

## 2020-04-14 RX ADMIN — AMIODARONE HYDROCHLORIDE 100 MILLIGRAM(S): 400 TABLET ORAL at 05:54

## 2020-04-14 RX ADMIN — SENNA PLUS 2 TABLET(S): 8.6 TABLET ORAL at 22:16

## 2020-04-14 RX ADMIN — AMLODIPINE BESYLATE 5 MILLIGRAM(S): 2.5 TABLET ORAL at 05:31

## 2020-04-14 RX ADMIN — Medication 25 MILLIGRAM(S): at 17:43

## 2020-04-14 RX ADMIN — Medication 5 MILLIGRAM(S): at 22:16

## 2020-04-14 NOTE — PROGRESS NOTE ADULT - SUBJECTIVE AND OBJECTIVE BOX
CLIFTON LYNN 70y Female      Patient is a 70y old  Female who presents with a chief complaint of covid r/o given fevers, non productive cough (14 Apr 2020 08:24)        INTERVAL HPI/OVERNIGHT EVENTS: No acute events overnight. Patient was seen and evaluated at the bedside. The patient denies pain. 92% on room air. Patient denies fever/chills, chest pain, shortness of breath, abdominal pain, headaches, nausea/vomiting, and diarrhea/constipation. Anxious to participate in PT and go home.       PHYSICAL EXAM:  GENERAL: NAD  HEAD:  Normocephalic  EYES:  conjunctiva and sclera clear  ENMT: Moist mucous membranes  NECK: Supple  NERVOUS SYSTEM:  Alert, awake  CHEST/LUNG: Good air exchange bilaterally, no wheeze  HEART: Regular rate and rhythm        Vital Signs Last 24 Hrs  T(C): 36.2 (14 Apr 2020 06:08), Max: 36.7 (13 Apr 2020 21:31)  T(F): 97.2 (14 Apr 2020 06:08), Max: 98.1 (13 Apr 2020 21:31)  HR: 88 (14 Apr 2020 06:08) (88 - 105)  BP: 102/58 (14 Apr 2020 06:08) (102/58 - 120/83)  BP(mean): --  RR: 18 (14 Apr 2020 06:08) (18 - 18)  SpO2: 92% (14 Apr 2020 06:08) (90% - 94%)      Consultant(s) Notes Reviewed:  [X] YES  [ ] NO  Care Discussed with Consultants/Other Providers [X] YES  [ ] NO  Imaging Personally Reviewed:  [X] YES  [ ] NO      LABS:                        11.3   8.18  )-----------( 276      ( 13 Apr 2020 08:09 )             33.6     04-13    141  |  105  |  8<L>  ----------------------------<  93  4.2   |  25  |  0.5<L>    Ca    9.1      13 Apr 2020 08:09  Mg     1.9     04-13    TPro  5.7<L>  /  Alb  3.1<L>  /  TBili  0.4  /  DBili  x   /  AST  22  /  ALT  45<H>  /  AlkPhos  107  04-13          CAPILLARY BLOOD GLUCOSE

## 2020-04-14 NOTE — PROGRESS NOTE ADULT - ASSESSMENT
70F PMHx AFib, HTN here with acute hypoxic resp failure; fever due to covid s/p intubation 3/24, now extubated.    #Acute hypoxic resp failure, fever; due to covid  resolved, was placed on 2L/min, trial to decrease O2 supplementation, anxious would add Xanax 0.25 mg BID  s/p course plaquenil, steroids, tociluzimab  cxr noted   PT, seen initially on 4/10  may not be a good candidate for transfer [facility] as she requres at least 1 person assist with ambulation  discharge planning to snf fro sub-acute rehab    AFib on eliquis  c/w lopressor 25 bid, amiodarone 100 mg,  check TFT    HTN on norvasc 5mg     DVT ppx  eliquis    case management for d/c planning to SNF

## 2020-04-14 NOTE — PROGRESS NOTE ADULT - NSREFPHYEXREFTO_GEN_ALL_CORE
Inpatient Physical Exam

## 2020-04-14 NOTE — PROGRESS NOTE ADULT - SUBJECTIVE AND OBJECTIVE BOX
CLIFTON LYNN  70[facility]  HPI:  69 y/o female with pmh of a-fib, c/o fever, non-productive cough, body aches and decreased appetite x 7 days. No known sick contacts. No diarrhea. No ABD pain. No H/A, no neck pain. No dysuria/frequency/urgency. Today with dyspnea at rest. No hemoptysis.        Patient is a 70y old  Female who presents with a chief complaint of covid r/o given fevers, non productive cough (13 Apr 2020 14:00)      INTERVAL HPI/OVERNIGHT EVENTS: NC 2L/min      T(C): 36.2 (04-14-20 @ 06:08), Max: 36.7 (04-13-20 @ 21:31)  HR: 88 (04-14-20 @ 06:08) (88 - 105)  BP: 102/58 (04-14-20 @ 06:08) (102/58 - 120/83)  RR: 18 (04-14-20 @ 06:08) (18 - 18)  SpO2: 92% (04-14-20 @ 06:08) (90% - 94%)  Wt(kg): --Vital Signs Last 24 Hrs  T(C): 36.2 (14 Apr 2020 06:08), Max: 36.7 (13 Apr 2020 21:31)  T(F): 97.2 (14 Apr 2020 06:08), Max: 98.1 (13 Apr 2020 21:31)  HR: 88 (14 Apr 2020 06:08) (88 - 105)  BP: 102/58 (14 Apr 2020 06:08) (102/58 - 120/83)  BP(mean): --  RR: 18 (14 Apr 2020 06:08) (18 - 18)  SpO2: 92% (14 Apr 2020 06:08) (90% - 94%)        LABS:                            11.3   8.18  )-----------( 276      ( 13 Apr 2020 08:09 )             33.6   04-13    141  |  105  |  8<L>  ----------------------------<  93  4.2   |  25  |  0.5<L>    Ca    9.1      13 Apr 2020 08:09  Mg     1.9     04-13    TPro  5.7<L>  /  Alb  3.1<L>  /  TBili  0.4  /  DBili  x   /  AST  22  /  ALT  45<H>  /  AlkPhos  107  04-13  C-Reactive Protein, Serum (04.11.20 @ 06:34)    C-Reactive Protein, Serum: 0.24 mg/dL    Procalcitonin, Serum (04.13.20 @ 08:09)    Procalcitonin, Serum: 0.04:         Inpatient Medications:  acetaminophen   Tablet .. 650 milliGRAM(s) Oral every 6 hours PRN  aMIOdarone    Tablet 100 milliGRAM(s) Oral daily  amLODIPine   Tablet 5 milliGRAM(s) Oral daily  apixaban 2.5 milliGRAM(s) Oral every 12 hours  melatonin 5 milliGRAM(s) Oral at bedtime  metoprolol tartrate 25 milliGRAM(s) Oral two times a day  pantoprazole   Suspension 40 milliGRAM(s) Oral daily  polyethylene glycol 3350 17 Gram(s) Oral daily  senna 2 Tablet(s) Oral at bedtime  thiamine 100 milliGRAM(s) Oral daily CLIFTON LYNN  70[facility]  HPI:  69 y/o female with pmh of a-fib, c/o fever, non-productive cough, body aches and decreased appetite x 7 days. No known sick contacts. No diarrhea. No ABD pain. No H/A, no neck pain. No dysuria/frequency/urgency. Today with dyspnea at rest. No hemoptysis.        Patient is a 70y old  Female who presents with a chief complaint of covid r/o given fevers, non productive cough (13 Apr 2020 14:00)      INTERVAL HPI/OVERNIGHT EVENTS: NC 2L/min, very anxious, de-satted to 84% when sitting at the edge of the bed, now comfortable       T(C): 36.2 (04-14-20 @ 06:08), Max: 36.7 (04-13-20 @ 21:31)  HR: 88 (04-14-20 @ 06:08) (88 - 105)  BP: 102/58 (04-14-20 @ 06:08) (102/58 - 120/83)  RR: 18 (04-14-20 @ 06:08) (18 - 18)  SpO2: 92% (04-14-20 @ 06:08) (90% - 94%)  Wt(kg): --Vital Signs Last 24 Hrs  T(C): 36.2 (14 Apr 2020 06:08), Max: 36.7 (13 Apr 2020 21:31)  T(F): 97.2 (14 Apr 2020 06:08), Max: 98.1 (13 Apr 2020 21:31)  HR: 88 (14 Apr 2020 06:08) (88 - 105)  BP: 102/58 (14 Apr 2020 06:08) (102/58 - 120/83)  BP(mean): --  RR: 18 (14 Apr 2020 06:08) (18 - 18)  SpO2: 92% (14 Apr 2020 06:08) (90% - 94%)        LABS:                            11.3   8.18  )-----------( 276      ( 13 Apr 2020 08:09 )             33.6   04-13    141  |  105  |  8<L>  ----------------------------<  93  4.2   |  25  |  0.5<L>    Ca    9.1      13 Apr 2020 08:09  Mg     1.9     04-13    TPro  5.7<L>  /  Alb  3.1<L>  /  TBili  0.4  /  DBili  x   /  AST  22  /  ALT  45<H>  /  AlkPhos  107  04-13  C-Reactive Protein, Serum (04.11.20 @ 06:34)    C-Reactive Protein, Serum: 0.24 mg/dL    Procalcitonin, Serum (04.13.20 @ 08:09)    Procalcitonin, Serum: 0.04:         Inpatient Medications:  acetaminophen   Tablet .. 650 milliGRAM(s) Oral every 6 hours PRN  aMIOdarone    Tablet 100 milliGRAM(s) Oral daily  amLODIPine   Tablet 5 milliGRAM(s) Oral daily  apixaban 2.5 milliGRAM(s) Oral every 12 hours  melatonin 5 milliGRAM(s) Oral at bedtime  metoprolol tartrate 25 milliGRAM(s) Oral two times a day  pantoprazole   Suspension 40 milliGRAM(s) Oral daily  polyethylene glycol 3350 17 Gram(s) Oral daily  senna 2 Tablet(s) Oral at bedtime  thiamine 100 milliGRAM(s) Oral daily

## 2020-04-14 NOTE — PROGRESS NOTE ADULT - ASSESSMENT
71 y/o female with pmh of a-fib, c/o fever, non-productive cough, body aches and decreased appetite x 7 days. No known sick contacts. No diarrhea. No ABD pain. Patient was found to be COVID +. She required intubated on 3/24/2020 for hypoxic respiratory failure. Extubated 4/5.     # Acute hypoxic respiratory failure due to COVID-19   - required intubation on 3/24, extubated on 4/5. Currently on room air at 92%.   - Completed courses of Unasyn for possible aspiration PNA, Plaquenil, solumedrol, and tocilizumab  - Dispo may includes SNF v [facility] after she ambulates Needs intensive PT as she is very debilitated. Was planned to go yesterday, but desatted significantly to the 70's%.   - Inflammatory markers have been stable    # Chronic A.fib with high rate A.fib, controlled   - Oral amiodarone and metoprolol 25 BID   - Eliquis 2.5 twice daily     # HTN   - continue metoprolol  - dc Norvasc as pressures have been soft     Diet: soft diet   DVT prophylaxis: Eliquis   Ambulation: bed to chair

## 2020-04-15 LAB
ALBUMIN SERPL ELPH-MCNC: 3.3 G/DL — LOW (ref 3.5–5.2)
ALP SERPL-CCNC: 111 U/L — SIGNIFICANT CHANGE UP (ref 30–115)
ALT FLD-CCNC: 34 U/L — SIGNIFICANT CHANGE UP (ref 0–41)
ANION GAP SERPL CALC-SCNC: 12 MMOL/L — SIGNIFICANT CHANGE UP (ref 7–14)
AST SERPL-CCNC: 17 U/L — SIGNIFICANT CHANGE UP (ref 0–41)
BASOPHILS # BLD AUTO: 0.03 K/UL — SIGNIFICANT CHANGE UP (ref 0–0.2)
BASOPHILS NFR BLD AUTO: 0.3 % — SIGNIFICANT CHANGE UP (ref 0–1)
BILIRUB SERPL-MCNC: 0.4 MG/DL — SIGNIFICANT CHANGE UP (ref 0.2–1.2)
BUN SERPL-MCNC: 9 MG/DL — LOW (ref 10–20)
CALCIUM SERPL-MCNC: 9.3 MG/DL — SIGNIFICANT CHANGE UP (ref 8.5–10.1)
CHLORIDE SERPL-SCNC: 102 MMOL/L — SIGNIFICANT CHANGE UP (ref 98–110)
CO2 SERPL-SCNC: 26 MMOL/L — SIGNIFICANT CHANGE UP (ref 17–32)
CREAT SERPL-MCNC: 0.6 MG/DL — LOW (ref 0.7–1.5)
EOSINOPHIL # BLD AUTO: 0.35 K/UL — SIGNIFICANT CHANGE UP (ref 0–0.7)
EOSINOPHIL NFR BLD AUTO: 3.7 % — SIGNIFICANT CHANGE UP (ref 0–8)
GLUCOSE SERPL-MCNC: 108 MG/DL — HIGH (ref 70–99)
HCT VFR BLD CALC: 34.8 % — LOW (ref 37–47)
HGB BLD-MCNC: 11.2 G/DL — LOW (ref 12–16)
IMM GRANULOCYTES NFR BLD AUTO: 0.7 % — HIGH (ref 0.1–0.3)
LYMPHOCYTES # BLD AUTO: 0.52 K/UL — LOW (ref 1.2–3.4)
LYMPHOCYTES # BLD AUTO: 5.5 % — LOW (ref 20.5–51.1)
MAGNESIUM SERPL-MCNC: 2 MG/DL — SIGNIFICANT CHANGE UP (ref 1.8–2.4)
MCHC RBC-ENTMCNC: 29 PG — SIGNIFICANT CHANGE UP (ref 27–31)
MCHC RBC-ENTMCNC: 32.2 G/DL — SIGNIFICANT CHANGE UP (ref 32–37)
MCV RBC AUTO: 90.2 FL — SIGNIFICANT CHANGE UP (ref 81–99)
MONOCYTES # BLD AUTO: 0.85 K/UL — HIGH (ref 0.1–0.6)
MONOCYTES NFR BLD AUTO: 9.1 % — SIGNIFICANT CHANGE UP (ref 1.7–9.3)
NEUTROPHILS # BLD AUTO: 7.56 K/UL — HIGH (ref 1.4–6.5)
NEUTROPHILS NFR BLD AUTO: 80.7 % — HIGH (ref 42.2–75.2)
NRBC # BLD: 0 /100 WBCS — SIGNIFICANT CHANGE UP (ref 0–0)
PLATELET # BLD AUTO: 315 K/UL — SIGNIFICANT CHANGE UP (ref 130–400)
POTASSIUM SERPL-MCNC: 4.1 MMOL/L — SIGNIFICANT CHANGE UP (ref 3.5–5)
POTASSIUM SERPL-SCNC: 4.1 MMOL/L — SIGNIFICANT CHANGE UP (ref 3.5–5)
PROCALCITONIN SERPL-MCNC: 0.1 NG/ML — SIGNIFICANT CHANGE UP (ref 0.02–0.1)
PROT SERPL-MCNC: 6.2 G/DL — SIGNIFICANT CHANGE UP (ref 6–8)
RBC # BLD: 3.86 M/UL — LOW (ref 4.2–5.4)
RBC # FLD: 15.8 % — HIGH (ref 11.5–14.5)
SODIUM SERPL-SCNC: 140 MMOL/L — SIGNIFICANT CHANGE UP (ref 135–146)
WBC # BLD: 9.38 K/UL — SIGNIFICANT CHANGE UP (ref 4.8–10.8)
WBC # FLD AUTO: 9.38 K/UL — SIGNIFICANT CHANGE UP (ref 4.8–10.8)

## 2020-04-15 PROCEDURE — 99233 SBSQ HOSP IP/OBS HIGH 50: CPT | Mod: CS,GC

## 2020-04-15 RX ORDER — LANOLIN ALCOHOL/MO/W.PET/CERES
1 CREAM (GRAM) TOPICAL
Qty: 0 | Refills: 0 | DISCHARGE
Start: 2020-04-15

## 2020-04-15 RX ORDER — ALPRAZOLAM 0.25 MG
1 TABLET ORAL
Qty: 0 | Refills: 0 | DISCHARGE
Start: 2020-04-15

## 2020-04-15 RX ADMIN — AMIODARONE HYDROCHLORIDE 100 MILLIGRAM(S): 400 TABLET ORAL at 05:41

## 2020-04-15 RX ADMIN — POLYETHYLENE GLYCOL 3350 17 GRAM(S): 17 POWDER, FOR SOLUTION ORAL at 12:26

## 2020-04-15 RX ADMIN — SENNA PLUS 2 TABLET(S): 8.6 TABLET ORAL at 20:29

## 2020-04-15 RX ADMIN — Medication 25 MILLIGRAM(S): at 18:45

## 2020-04-15 RX ADMIN — PANTOPRAZOLE SODIUM 40 MILLIGRAM(S): 20 TABLET, DELAYED RELEASE ORAL at 12:26

## 2020-04-15 RX ADMIN — Medication 25 MILLIGRAM(S): at 05:40

## 2020-04-15 RX ADMIN — APIXABAN 2.5 MILLIGRAM(S): 2.5 TABLET, FILM COATED ORAL at 21:22

## 2020-04-15 RX ADMIN — Medication 100 MILLIGRAM(S): at 12:26

## 2020-04-15 RX ADMIN — APIXABAN 2.5 MILLIGRAM(S): 2.5 TABLET, FILM COATED ORAL at 05:40

## 2020-04-15 RX ADMIN — Medication 5 MILLIGRAM(S): at 20:29

## 2020-04-15 NOTE — PROGRESS NOTE ADULT - NSHPATTENDINGPLANDISCUSS_GEN_ALL_CORE
ICu team
ICU
ICU team
MICu team
MICu team
resident, nurse
nurse
House staff
resident

## 2020-04-15 NOTE — SWALLOW BEDSIDE ASSESSMENT ADULT - SLP PERTINENT HISTORY OF CURRENT PROBLEM
Pt admitted w/ intubated 3/24, extubated 4/5, initial SLP eval received 4/6, reccs for NPO made 2' pt was requiring venti mask at the time to maintain adequate oxygenation. Pt was placed on po diet by MD 4/7 (shefali w/ nectar) new orders received today to upgrade po diet

## 2020-04-15 NOTE — PROGRESS NOTE ADULT - SUBJECTIVE AND OBJECTIVE BOX
CLIFTON LYNN 70y Female      Patient is a 70y old  Female who presents with a chief complaint of covid r/o given fevers, non productive cough (14 Apr 2020 08:57)        INTERVAL HPI/OVERNIGHT EVENTS: No acute events overnight. Patient was seen and evaluated at the bedside. The patient denies pain. Vitals stable. Patient denies fever/chills, chest pain, shortness of breath, abdominal pain, headaches, nausea/vomiting, and diarrhea/constipation.      PHYSICAL EXAM:  GENERAL: NAD  HEAD:  Normocephalic  EYES:  conjunctiva and sclera clear  ENMT: Moist mucous membranes  NECK: Supple  NERVOUS SYSTEM:  Alert, awake  CHEST/LUNG: Good air exchange bilaterally, no wheeze  HEART: Regular rate and rhythm        Vital Signs Last 24 Hrs  T(C): 36.2 (15 Apr 2020 05:34), Max: 36.3 (14 Apr 2020 21:37)  T(F): 97.1 (15 Apr 2020 05:34), Max: 97.3 (14 Apr 2020 21:37)  HR: 80 (15 Apr 2020 05:34) (79 - 106)  BP: 119/58 (15 Apr 2020 05:34) (106/54 - 123/56)  BP(mean): --  RR: 18 (15 Apr 2020 05:34) (18 - 18)  SpO2: 94% (15 Apr 2020 05:34) (87% - 95%)      Consultant(s) Notes Reviewed:  [X] YES  [ ] NO  Care Discussed with Consultants/Other Providers [X] YES  [ ] NO  Imaging Personally Reviewed:  [X] YES  [ ] NO      LABS:                        11.2   9.38  )-----------( 315      ( 15 Apr 2020 06:01 )             34.8     04-15    140  |  102  |  9<L>  ----------------------------<  108<H>  4.1   |  26  |  0.6<L>    Ca    9.3      15 Apr 2020 06:01  Mg     2.0     04-15    TPro  6.2  /  Alb  3.3<L>  /  TBili  0.4  /  DBili  x   /  AST  17  /  ALT  34  /  AlkPhos  111  04-15          CAPILLARY BLOOD GLUCOSE CLIFTON LYNN 70y Female    Patient is a 70y old  Female who presents with a chief complaint of covid r/o given fevers, non productive cough (14 Apr 2020 08:57)    INTERVAL HPI/OVERNIGHT EVENTS: No acute events overnight. Patient was seen and evaluated at the bedside. The patient denies pain. Vitals stable. Patient denies fever/chills, chest pain, shortness of breath, abdominal pain, headaches, nausea/vomiting, and diarrhea/constipation.      PHYSICAL EXAM:  GENERAL: NAD  HEAD:  Normocephalic  EYES:  conjunctiva and sclera clear  ENMT: Moist mucous membranes  NECK: Supple  NERVOUS SYSTEM:  Alert, awake  CHEST/LUNG: Good air exchange bilaterally, no wheeze  HEART: Regular rate and rhythm        Vital Signs Last 24 Hrs  T(C): 36.2 (15 Apr 2020 05:34), Max: 36.3 (14 Apr 2020 21:37)  T(F): 97.1 (15 Apr 2020 05:34), Max: 97.3 (14 Apr 2020 21:37)  HR: 80 (15 Apr 2020 05:34) (79 - 106)  BP: 119/58 (15 Apr 2020 05:34) (106/54 - 123/56)  BP(mean): --  RR: 18 (15 Apr 2020 05:34) (18 - 18)  SpO2: 94% (15 Apr 2020 05:34) (87% - 95%)      Consultant(s) Notes Reviewed:  [X] YES  [ ] NO  Care Discussed with Consultants/Other Providers [X] YES  [ ] NO  Imaging Personally Reviewed:  [X] YES  [ ] NO      LABS:                        11.2   9.38  )-----------( 315      ( 15 Apr 2020 06:01 )             34.8     04-15    140  |  102  |  9<L>  ----------------------------<  108<H>  4.1   |  26  |  0.6<L>    Ca    9.3      15 Apr 2020 06:01  Mg     2.0     04-15    TPro  6.2  /  Alb  3.3<L>  /  TBili  0.4  /  DBili  x   /  AST  17  /  ALT  34  /  AlkPhos  111  04-15          CAPILLARY BLOOD GLUCOSE

## 2020-04-15 NOTE — PROGRESS NOTE ADULT - ATTENDING COMMENTS
Patient stable for d/c to [facility] on 2L/min via NC during PT/ambulation, patient SpO2  97% at rest  include Xanax 0.25 mg PO BID and Toviaz 4 mg qhs Patient stable for d/c to [facility] on 2-3L/min via NC during PT/ambulation, patient SpO2  97% at rest, however due to shortage of oxygen compressors [facility] is not able to accept the patient at this time, will transfer to [facility]   include Xanax 0.25 mg PO BID and Toviaz 4 mg qhs in transfer meds

## 2020-04-15 NOTE — PROGRESS NOTE ADULT - ASSESSMENT
69 y/o female with pmh of a-fib, c/o fever, non-productive cough, body aches and decreased appetite x 7 days. No known sick contacts. No diarrhea. No ABD pain. Patient was found to be COVID +. She required intubated on 3/24/2020 for hypoxic respiratory failure. Extubated 4/5.     # Acute hypoxic respiratory failure due to COVID-19   - required intubation on 3/24, extubated on 4/5. Currently on room air at 92%.   - Completed courses of Unasyn for possible aspiration PNA, Plaquenil, solumedrol, and tocilizumab  - Dispo may includes SNF v [facility] after she ambulates Needs intensive PT as she is very debilitated. Was anticipated, but desatted significantly into the 70's%.   - Inflammatory markers have been stable. CXR improved    # Chronic A.fib with high rate A.fib, controlled   - Oral amiodarone and metoprolol 25 BID   - Eliquis 2.5 twice daily     # HTN   - continue metoprolol  - dc Norvasc as pressures have been soft     Diet: Regular diet with thin liquids   DVT prophylaxis: Eliquis   Ambulation: bed to chair

## 2020-04-15 NOTE — CHART NOTE - NSCHARTNOTEFT_GEN_A_CORE
Patient requested to speak with her family. We called the family through [employer] and allowed Face to face communication. Pt and family were very satisfied with this. Treatment plan was discusses with the patient's son. They would like daily communication. [phone]

## 2020-04-15 NOTE — CHART NOTE - NSCHARTNOTEFT_GEN_A_CORE
TRANSFER NOTE TO [facility] [place]A [id number]    Vital Signs Last 24 Hrs  T(C): 36.4 (15 Apr 2020 13:04), Max: 36.4 (15 Apr 2020 13:04)  T(F): 97.6 (15 Apr 2020 13:04), Max: 97.6 (15 Apr 2020 13:04)  HR: 88 (15 Apr 2020 13:04) (79 - 106)  BP: 104/55 (15 Apr 2020 13:04) (104/55 - 123/56)  BP(mean): --  RR: 18 (15 Apr 2020 13:04) (18 - 18)  SpO2: 94% (15 Apr 2020 05:34) (87% - 95%)    (04-15 @ 06:01)                      11.2  9.38 )-----------( 315                 34.8    Neutrophils = 7.56 (80.7%)  Lymphocytes = 0.52 (5.5%)  Eosinophils = 0.35 (3.7%)  Basophils = 0.03 (0.3%)  Monocytes = 0.85 (9.1%)  Bands = --%    04-15    140  |  102  |  9<L>  ----------------------------<  108<H>  4.1   |  26  |  0.6<L>    Ca    9.3      15 Apr 2020 06:01  Mg     2.0     04-15    TPro  6.2  /  Alb  3.3<L>  /  TBili  0.4  /  DBili  x   /  AST  17  /  ALT  34  /  AlkPhos  111  04-15      MEDICATIONS  (STANDING):  ALPRAZolam 0.25 milliGRAM(s) Oral two times a day  aMIOdarone    Tablet 100 milliGRAM(s) Oral daily  apixaban 2.5 milliGRAM(s) Oral every 12 hours  melatonin 5 milliGRAM(s) Oral at bedtime  metoprolol tartrate 25 milliGRAM(s) Oral two times a day  pantoprazole   Suspension 40 milliGRAM(s) Oral daily  polyethylene glycol 3350 17 Gram(s) Oral daily  senna 2 Tablet(s) Oral at bedtime  thiamine 100 milliGRAM(s) Oral daily    MEDICATIONS  (PRN):  acetaminophen   Tablet .. 650 milliGRAM(s) Oral every 6 hours PRN Temp greater or equal to 38C (100.4F)    69 y/o female with pmh of a-fib, c/o fever, non-productive cough, body aches and decreased appetite x 7 days. No known sick contacts. No diarrhea. No ABD pain. Patient was found to be COVID +. She required intubated on 3/24/2020 for hypoxic respiratory failure. Extubated 4/5.     # Acute hypoxic respiratory failure due to COVID-19   - required intubation on 3/24, extubated on 4/5. Currently on room air at 92%.   - Completed courses of Unasyn for possible aspiration PNA, Plaquenil, solumedrol, and tocilizumab  - Dispo may includes [facility] after she ambulates Needs intensive PT as she is very debilitated. Was anticipated, but desatted significantly into the 70's%.   - Inflammatory markers have been stable. CXR improved    # Chronic A.fib with high rate A.fib, controlled   - Oral amiodarone and metoprolol 25 BID   - Eliquis 2.5 twice daily     # HTN   - continue metoprolol  - dc Norvasc as pressures have been soft     Diet: Regular diet with thin liquids   DVT prophylaxis: Eliquis   Ambulation: bed to chair

## 2020-04-15 NOTE — SWALLOW BEDSIDE ASSESSMENT ADULT - SLP GENERAL OBSERVATIONS
RN provided po trials as directed by SLP w/ +toleration, pt judged to be appropriate for po diet upgrade

## 2020-04-15 NOTE — SWALLOW BEDSIDE ASSESSMENT ADULT - SWALLOW EVAL: DIAGNOSIS
+gross toleration of thin liquids per RN w/o overt s/s of penetration/aspiration. +full set of dentition, clear speech

## 2020-04-16 ENCOUNTER — TRANSCRIPTION ENCOUNTER (OUTPATIENT)
Age: 70
End: 2020-04-16

## 2020-04-16 VITALS
DIASTOLIC BLOOD PRESSURE: 73 MMHG | SYSTOLIC BLOOD PRESSURE: 120 MMHG | HEART RATE: 102 BPM | RESPIRATION RATE: 20 BRPM | OXYGEN SATURATION: 93 % | TEMPERATURE: 98 F

## 2020-04-16 LAB
ALBUMIN SERPL ELPH-MCNC: 3.5 G/DL — SIGNIFICANT CHANGE UP (ref 3.5–5.2)
ALP SERPL-CCNC: 111 U/L — SIGNIFICANT CHANGE UP (ref 30–115)
ALT FLD-CCNC: 35 U/L — SIGNIFICANT CHANGE UP (ref 0–41)
ANION GAP SERPL CALC-SCNC: 14 MMOL/L — SIGNIFICANT CHANGE UP (ref 7–14)
AST SERPL-CCNC: 20 U/L — SIGNIFICANT CHANGE UP (ref 0–41)
BASOPHILS # BLD AUTO: 0.03 K/UL — SIGNIFICANT CHANGE UP (ref 0–0.2)
BASOPHILS NFR BLD AUTO: 0.3 % — SIGNIFICANT CHANGE UP (ref 0–1)
BILIRUB SERPL-MCNC: 0.5 MG/DL — SIGNIFICANT CHANGE UP (ref 0.2–1.2)
BUN SERPL-MCNC: 10 MG/DL — SIGNIFICANT CHANGE UP (ref 10–20)
CALCIUM SERPL-MCNC: 9.5 MG/DL — SIGNIFICANT CHANGE UP (ref 8.5–10.1)
CHLORIDE SERPL-SCNC: 101 MMOL/L — SIGNIFICANT CHANGE UP (ref 98–110)
CO2 SERPL-SCNC: 25 MMOL/L — SIGNIFICANT CHANGE UP (ref 17–32)
CREAT SERPL-MCNC: 0.7 MG/DL — SIGNIFICANT CHANGE UP (ref 0.7–1.5)
CRP SERPL-MCNC: 9.46 MG/DL — HIGH (ref 0–0.4)
EOSINOPHIL # BLD AUTO: 0.44 K/UL — SIGNIFICANT CHANGE UP (ref 0–0.7)
EOSINOPHIL NFR BLD AUTO: 4.9 % — SIGNIFICANT CHANGE UP (ref 0–8)
GLUCOSE SERPL-MCNC: 103 MG/DL — HIGH (ref 70–99)
HCT VFR BLD CALC: 37.6 % — SIGNIFICANT CHANGE UP (ref 37–47)
HGB BLD-MCNC: 12.3 G/DL — SIGNIFICANT CHANGE UP (ref 12–16)
IMM GRANULOCYTES NFR BLD AUTO: 0.4 % — HIGH (ref 0.1–0.3)
LYMPHOCYTES # BLD AUTO: 0.56 K/UL — LOW (ref 1.2–3.4)
LYMPHOCYTES # BLD AUTO: 6.2 % — LOW (ref 20.5–51.1)
MAGNESIUM SERPL-MCNC: 2 MG/DL — SIGNIFICANT CHANGE UP (ref 1.8–2.4)
MCHC RBC-ENTMCNC: 30.1 PG — SIGNIFICANT CHANGE UP (ref 27–31)
MCHC RBC-ENTMCNC: 32.7 G/DL — SIGNIFICANT CHANGE UP (ref 32–37)
MCV RBC AUTO: 91.9 FL — SIGNIFICANT CHANGE UP (ref 81–99)
MONOCYTES # BLD AUTO: 0.74 K/UL — HIGH (ref 0.1–0.6)
MONOCYTES NFR BLD AUTO: 8.2 % — SIGNIFICANT CHANGE UP (ref 1.7–9.3)
NEUTROPHILS # BLD AUTO: 7.22 K/UL — HIGH (ref 1.4–6.5)
NEUTROPHILS NFR BLD AUTO: 80 % — HIGH (ref 42.2–75.2)
NRBC # BLD: 0 /100 WBCS — SIGNIFICANT CHANGE UP (ref 0–0)
PLATELET # BLD AUTO: 355 K/UL — SIGNIFICANT CHANGE UP (ref 130–400)
POTASSIUM SERPL-MCNC: 4.4 MMOL/L — SIGNIFICANT CHANGE UP (ref 3.5–5)
POTASSIUM SERPL-SCNC: 4.4 MMOL/L — SIGNIFICANT CHANGE UP (ref 3.5–5)
PROCALCITONIN SERPL-MCNC: 0.05 NG/ML — SIGNIFICANT CHANGE UP (ref 0.02–0.1)
PROT SERPL-MCNC: 6.6 G/DL — SIGNIFICANT CHANGE UP (ref 6–8)
RBC # BLD: 4.09 M/UL — LOW (ref 4.2–5.4)
RBC # FLD: 15.6 % — HIGH (ref 11.5–14.5)
SODIUM SERPL-SCNC: 140 MMOL/L — SIGNIFICANT CHANGE UP (ref 135–146)
T3 SERPL-MCNC: 69 NG/DL — LOW (ref 80–200)
T4 AB SER-ACNC: 8.5 UG/DL — SIGNIFICANT CHANGE UP (ref 4.6–12)
TSH SERPL-MCNC: 2.84 UIU/ML — SIGNIFICANT CHANGE UP (ref 0.27–4.2)
WBC # BLD: 9.03 K/UL — SIGNIFICANT CHANGE UP (ref 4.8–10.8)
WBC # FLD AUTO: 9.03 K/UL — SIGNIFICANT CHANGE UP (ref 4.8–10.8)

## 2020-04-16 PROCEDURE — 99238 HOSP IP/OBS DSCHRG MGMT 30/<: CPT | Mod: CS

## 2020-04-16 RX ORDER — ACETAMINOPHEN 500 MG
2 TABLET ORAL
Qty: 0 | Refills: 0 | DISCHARGE
Start: 2020-04-16

## 2020-04-16 RX ADMIN — POLYETHYLENE GLYCOL 3350 17 GRAM(S): 17 POWDER, FOR SOLUTION ORAL at 17:37

## 2020-04-16 RX ADMIN — AMIODARONE HYDROCHLORIDE 100 MILLIGRAM(S): 400 TABLET ORAL at 07:25

## 2020-04-16 RX ADMIN — APIXABAN 2.5 MILLIGRAM(S): 2.5 TABLET, FILM COATED ORAL at 17:36

## 2020-04-16 RX ADMIN — Medication 25 MILLIGRAM(S): at 05:34

## 2020-04-16 RX ADMIN — Medication 25 MILLIGRAM(S): at 17:36

## 2020-04-16 RX ADMIN — Medication 100 MILLIGRAM(S): at 17:37

## 2020-04-16 RX ADMIN — APIXABAN 2.5 MILLIGRAM(S): 2.5 TABLET, FILM COATED ORAL at 05:31

## 2020-04-16 RX ADMIN — PANTOPRAZOLE SODIUM 40 MILLIGRAM(S): 20 TABLET, DELAYED RELEASE ORAL at 17:36

## 2020-04-16 NOTE — OCCUPATIONAL THERAPY INITIAL EVALUATION ADULT - GENERAL OBSERVATIONS, REHAB EVAL
Pt found sitting in hallway chair in NAD with O2 via NC 5L/min, SpO2 88%.  Pt agreeable to OT Evaluation.  Pt left sitting in hallway chair in NAD with O2 via NC 5L/min, SpO2 88%, bell within reach.

## 2020-04-16 NOTE — PROGRESS NOTE ADULT - PROVIDER SPECIALTY LIST ADULT
Critical Care
Infectious Disease
Internal Medicine
Critical Care

## 2020-04-16 NOTE — CHART NOTE - NSCHARTNOTEFT_GEN_A_CORE
Spoke with patient's son several times today. He is updated and aware of the patients plan. She will be discharged to [facility] today.

## 2020-04-16 NOTE — OCCUPATIONAL THERAPY INITIAL EVALUATION ADULT - EATING, PREVIOUS LEVEL OF FUNCTION, OT EVAL
Try Ibuprofen 400-600mg (2-3 otc tablets) with food every 4-6 hours if needed for the numbness in your left hand to control inflammation. Try otc wrist brace and/or sling to keep arm and wrist stable at night.      Consider EMG testing to determine true cause  Of your numbness.        Preventive Health Recommendations  Female Ages 40 to 49    Yearly exam:     See your health care provider every year in order to  1. Review health changes.   2. Discuss preventive care.    3. Review your medicines if your doctor prescribed any.      Get a Pap test every three years (unless you have an abnormal result and your provider advises testing more often).      If you get Pap tests with HPV test, you only need to test every 5 years, unless you have an abnormal result. You do not need a Pap test if your uterus was removed (hysterectomy) and you have not had cancer.      You should be tested each year for STDs (sexually transmitted diseases), if you're at risk.       Ask your doctor if you should have a mammogram.      Have a colonoscopy (test for colon cancer) if someone in your family has had colon cancer or polyps before age 50.       Have a cholesterol test every 5 years.       Have a diabetes test (fasting glucose) after age 45. If you are at risk for diabetes, you should have this test every 3 years.    Shots: Get a flu shot each year. Get a tetanus shot every 10 years.     Nutrition:     Eat at least 5 servings of fruits and vegetables each day.    Eat whole-grain bread, whole-wheat pasta and brown rice instead of white grains and rice.    Talk to your provider about Calcium and Vitamin D.     Lifestyle    Exercise at least 150 minutes a week (an average of 30 minutes a day, 5 days a week). This will help you control your weight and prevent disease.    Limit alcohol to one drink per day.    No smoking.     Wear sunscreen to prevent skin cancer.    See your dentist every six months for an exam and cleaning.                         Carpal Tunnel Syndrome               What is carpal tunnel syndrome?   Carpal tunnel syndrome is a common, painful disorder of the wrist and hand.   How does it occur?   Carpal tunnel syndrome is caused by pressure on the median nerve in your wrist. People who use their hands and wrists in the same motion over and over tend to develop carpal tunnel syndrome. It is common in cashiers, , assembly-line workers, and people who work on the computer.   Swelling from a broken bone or other injury can cause pressure on the nerve as well. Diseases such as arthritis, diabetes, or hypothyroidism can cause swelling and pressure in the wrist. Sometimes it happens during pregnancy.   What are the symptoms?   The symptoms include:   pain, numbness, or tingling in your hand and wrist, especially in the thumb and index and middle fingers; pain may radiate up into the forearm   increased pain with increased use of your hand, such as when you are driving or reading the newspaper   increased pain at night   weak  and tendency to drop objects held in the hand   sensitivity to cold   muscle deterioration especially in the thumb (in later stages)   How is it diagnosed?   Your healthcare provider will review your symptoms, examine you, and discuss the ways you use your hands. He or she may also do the following tests:   Your provider may tap the inside middle of your wrist over the median nerve. You may feel pain or a sensation like an electric shock.   You may be asked to bend your wrist down for one minute to see if this causes symptoms.   Your provider may arrange to test the response of your nerves and muscles to electrical stimulation.   How is it treated?   If you have a disease that is causing carpal tunnel syndrome (such as rheumatoid arthritis), treating the disease may relieve your symptoms.   Other treatment focuses on relieving irritation and pressure on the nerve in your wrist. To relieve pressure your  healthcare provider may suggest:   restricting use of your hand or changing the way you use it   changing the position of your desk, computer, and chair to one that irritates your wrist less   wearing a wrist splint   exercises   Your provider may prescribe a steroid medicine or a nonsteroidal anti-inflammatory medicine, such as ibuprofen. Nonsteroidal anti-inflammatory medicines (NSAIDs) may cause stomach bleeding and other problems. These risks increase with age. Read the label and take as directed. Unless recommended by your healthcare provider, do not take for more than 10 days.   Your provider may give you an injection of a corticosteroid medicine.   In some cases surgery may be necessary.   How long will the effects last?   How long the symptoms of carpal tunnel syndrome last depends on the cause and your response to treatment. Sometimes the symptoms go away without any treatment, or they may be relieved by nonsurgical treatment. Surgery may be needed to relieve the symptoms if they do not respond to treatment or they get worse. Surgery usually relieves the symptoms, especially if there is no permanent damage to the nerve.   Symptoms of carpal tunnel syndrome that occur during pregnancy usually disappear following delivery.   How can I take care of myself?   Follow your healthcare provider's recommendations. Also try the following:   Raise your arm on a pillow when you sit or lie down.   Avoid activities that overuse your hand.   When you use a computer mouse, use it with the hand that does not have carpal tunnel syndrome.   Find a different way to use your hand by using another tool or try to use the other hand.   Avoid bending your wrists.   When can I return to my normal activities?   Everyone recovers from an injury at a different rate. Return to your activities depends on how soon your wrist recovers, not by how many days or weeks it has been since your injury has occurred. In general, the longer you have  symptoms before you start treatment, the longer it will take to get better. The goal is to return to your normal activities as soon as is safely possible. If you return too soon you may worsen your injury.   You may return to your activities when you are able to painlessly  objects and have full range of motion and strength back in your wrist.   What can I do to help prevent carpal tunnel syndrome?   Make sure that your hands and wrists are supported, especially if you do repetitive work. Take regular breaks from the repetitive motion. Do not rest your wrists on hard or ridged surfaces for long periods of time.   In some cases the cause is not known and carpal tunnel syndrome cannot be prevented.            Carpal Tunnel Rehabilitation Exercise            You may do all of these exercises right away.   Wrist Range of Motion   0. Flexion: Gently bend your wrist forward. Hold for 5 seconds. Do 3 sets of 10.   0. Extension: Gently bend your wrist backward. Hold this position 5 seconds. Do 3 sets of 10.   0. Side to side: Gently move your wrist from side to side (a handshake motion). Hold for 5 seconds in each direction. Do 3 sets of 10.   Wrist stretch: Press the back of the hand on your injured side with your other hand to help bend your wrist. Hold for 15 to 30 seconds. Next, stretch the hand back by pressing the fingers in a backward direction. Hold for 15 to 30 seconds. Keep the arm on your injured side straight during this exercise. Do 3 sets.   Mid-trap exercise: Lie on your stomach on a firm surface and place a folded pillow underneath your chest. Place your arms out straight to your sides with your elbows straight and thumbs toward the ceiling. Slowly raise your arms toward the ceiling as you squeeze your shoulder blades together. Lower slowly. Do 3 sets of 15. As the exercise gets easier to do, hold soup cans or small weights in your hands.   Pectoralis stretch:  a doorway or corner with both hands  slightly above your head on the door frame or wall. Slowly lean forward until you feel a stretch in the front of your shoulders. Hold 15 to 30 seconds. Repeat 3 times.   Scalene stretch: Sit or stand and clasp both hands behind your back. Lower your left shoulder and tilt your head toward the right until you feel a stretch. Hold this position for 15 to 30 seconds and then come back to the starting position. Then lower your right shoulder and tilt your head toward the left. Hold for 15 to 30 seconds. Repeat 3 times on each side.   Thoracic extension: While sitting in a chair, clasp both arms behind your head. Gently arch backward and look up toward the ceiling. Repeat 10 times. Do this several times each day.   Scapular squeeze: While sitting or standing with your arms by your sides, squeeze your shoulder blades together and hold for 5 seconds. Do 3 sets of 10.   Wrist extension: Hold a soup can or hammer handle in your hand with your palm facing down. Slowly bend your wrist up. Slowly lower the weight down into the starting position. Do 3 sets of 10. Gradually increase the weight of the object you are holding.    strengthening: Squeeze a soft rubber ball and hold the squeeze for 5 seconds. Do 3 sets of 10.            Published by [employer].  This content is reviewed periodically and is subject to change as new health information becomes available. The information is intended to inform and educate and is not a replacement for medical evaluation, advice, diagnosis or treatment by a healthcare professional.   Written by Rachelle Wallace MS, PT, and Maggi Bojorquez PT, [facility], [facility], for [employer] [facility].   ? 2010 [facility] and/or its affiliates. All Rights Reserved.   Copyright   [employer] Reference Systems 2011    What Is Cubital Tunnel Syndrome?  Cubital tunnel syndrome is a set of symptoms that may occur if the ulnar nerve in your elbow gets pinched. This may happen if you bend or lean on your elbows often.    Your  "cubital tunnel  The cubital tunnel is a groove in a bone near your elbow. This narrow groove provides a passage for the ulnar nerve, one of the main nerves in your arm. The ulnar nerve can cause  funny bone  pain if your elbow gets bumped. Your cubital tunnel helps protect this nerve as it passes through your elbow and down to your fingers.  Compressing the ulnar nerve  Bending your elbow compresses the ulnar nerve inside the cubital tunnel. The nerve can get inflamed (irritated) after constant bending and pinching or after getting hurt. Over time, this can lead to pain or numbness. The pain is often felt in your ring fingers and little fingers.     Bending your elbow as you hold a phone can cause problems over time.    What are its symptoms?    Numbness or tingling in the ring fingers and little fingers    Loss of finger or hand strength    Inability to straighten fingers    Sharp, sudden pain when elbow is touched  The road to healing  You can keep cubital tunnel syndrome from flaring up. Avoid pinching the ulnar nerve by keeping your arm straight as much as you can, even while sleeping. And use phone headsets and elbow pads. If you still have pain, tell your doctor.   Date Last Reviewed: 9/8/2015 2000-2017 [employer]. [address] [place] [address], [place] [address]. All rights reserved. This information is not intended as a substitute for professional medical care. Always follow your healthcare professional's instructions.        Ulnar Nerve Palsy  The ulnar nerve travels down the arm from the shoulder to the hand. It controls movement and feeling in the wrist and hand. Damage to this nerve can cause a condition called ulnar nerve palsy.  A common cause of ulnar nerve palsy is leaning on the elbow for long periods of time. Injury to the arm or elbow, such as a fracture, can also damage the ulnar nerve.  Symptoms of ulnar nerve palsy include:    Numbness, tingling, or burning (often described as \"pins " "and needles\")    Pain    Weakness in the hand and fingers  The treatment depends on the cause of the nerve damage. In some cases, the problem will go away on its once pressure to the nerve is relieved. Splinting the wrist to limit movement may help with healing. If the problem is due to trauma or injury, physical therapy may be prescribed. Corticosteroids injections into the area may reduce swelling and pressure on the nerve. Surgery to repair the nerve may be needed for chronic symptoms that do not respond to simpler treatments.  Home care    Rest the wrist and arm until normal feeling and strength return.    If you were given a splint or sling, wear it as directed.    Avoid positions leaning on your elbows.    If medicines were prescribed for pain or nerve sensations, take them as directed.  Follow-up care  Follow up with your healthcare provider or as advised by our staff.  When to seek medical advice  Call your healthcare provider right away if you have any of the following:    Increasing arm swelling or pain    Numbness or weakness of the arm    Symptoms spread to other parts of the body    Slurred speech, confusion    Trouble speaking, walking, or seeing  Date Last Reviewed: 9/25/2015 2000-2017 [employer]. [address] [place] [address]. All rights reserved. This information is not intended as a substitute for professional medical care. Always follow your healthcare professional's instructions.                   Ganglion Cyst          What is a ganglion cyst?   A ganglion cyst is a swollen, closed sac under the skin. The sac is attached to the sheath of a tendon or may be attached to a joint. The cyst contains fluid similar to the fluid that is in your joints. It can vary in size from a small pea to a golf ball. Ganglion cysts most often occur on the wrist, at the end joint of a finger, or at the base of a finger. They may also occur on the foot.   How does it occur?   The cause of " ganglion cysts is not known.   What are the symptoms?   You may feel discomfort or pain. Sometimes the area of the cyst becomes swollen or disfigured.   How is it diagnosed?   If there is any question about the diagnosis, your healthcare provider may stick a needle into the cyst to take a sample of the fluid inside it. The fluid can be tested for other problems, such as infection.   How is it treated?   Unless a cyst hurts, it does not need to be treated. If it does hurt, put an ice pack, gel pack, or package of frozen vegetables, wrapped in a cloth, on the area for up to 20 minutes at a time every 3 to 4 hours, or at least once daily, until it becomes less painful. Taking an anti-inflammatory drug, such as aspirin or ibuprofen, may also help. Check with your healthcare provider before you give any medicine that contains aspirin or salicylates to a child or teen. This includes medicines like baby aspirin, some cold medicines, and Pepto Bismol. Children and teens who take aspirin are at risk for a serious illness called Reye's syndrome. Nonsteroidal anti-inflammatory medicines (NSAIDs) may cause stomach bleeding and other problems. These risks increase with age. Read the label and take as directed. Unless recommended by your healthcare provider, do not take for more than 10 days for any reason.   The fluid can be removed with a needle, but the cysts tend to fill up again with fluid.   Do not try to smash the cyst with a heavy object. Even if this home remedy succeeds at first, the cyst will almost always fill up again with fluid. In addition, you could seriously damage your wrist or finger.   If a ganglion cyst is painful, limits activity, or is unsightly, it can be surgically removed. Surgery to remove the cyst requires making a small cut through the skin. The cut usually heals quickly and leaves a small scar.   How long will the effects last?   Sometimes cysts eventually go away whether they are treated or not. If  your cyst is painful or interferes with your activities, you may need to have surgery. Even with surgical treatment, a cyst may come back.   How can I take care of myself?   Follow the treatment recommended by your healthcare provider.   How can I help prevent ganglion cysts?   There is no known way to prevent these cysts because their cause is not known.     Published by [employer].  This content is reviewed periodically and is subject to change as new health information becomes available. The information is intended to inform and educate and is not a replacement for medical evaluation, advice, diagnosis or treatment by a healthcare professional.   Developed by [employer].   ? 2010 [employer] and/or its affiliates. All Rights Reserved.   Copyright   Clinical Reference Systems 2011               independent

## 2020-04-16 NOTE — PROGRESS NOTE ADULT - ASSESSMENT
Assessment and Plan  71 y/o female with pmh of a-fib, c/o fever, non-productive cough, body aches and decreased appetite.. No known sick contacts. No diarrhea. No ABD pain. Patient was found to be COVID +. She required intubated on 3/24/2020 for hypoxic respiratory failure. Extubated 4/5. Transferred to [facility] on 4/15 for continuation of care.    # Acute hypoxic respiratory failure due to COVID-19   - required intubation on 3/24, extubated on 4/5. Currently sitting in a chair 91% on 5L NC.  - Desaturates with activity.  - Completed courses of Unasyn for possible aspiration PNA, Plaquenil, solumedrol, and tocilizumab  - Inflammatory markers have been stable. CXR improved  - Disposition may include SNF, needs intensive PT    # Chronic A.fib with high rate A.fib, controlled   - Oral amiodarone and metoprolol 25 BID   - Eliquis 2.5 twice daily     # HTN   - continue metoprolol  - dc Norvasc as pressures has been soft     Diet: Regular diet with thin liquids   DVT prophylaxis: Eliquis   Ambulation: bed to chair.

## 2020-04-16 NOTE — PROGRESS NOTE ADULT - SUBJECTIVE AND OBJECTIVE BOX
Medicine Progress Note    Patient is a 70y old  Female who presents with a chief complaint of covid r/o given fevers, non productive cough (15 Apr 2020 10:08)  69 y/o female with hx of a-fib, presented to the ED on 3/22 with complaints of fever, non-productive cough, body aches and decreased appetite. No known sick contacts. No diarrhea. No ABD pain. No H/A, no neck pain. No dysuria/frequency/urgency.     Patient was found to be COVID +. She required intubation on 3/24/2020 for hypoxic respiratory failure. Extubated 4/5.   Completed courses of Unasyn for possible aspiration PNA, Plaquenil, solumedrol, and tocilizumab.    Patient examined bedside and reports doing okay this morning. She was saturating at 93% on 1 NC. However, upon moving OOB to chair she desaturated to 80%. Oxygen was increased to 5L and patient was clinically stable SPO2 91%. Patient becomes anxious easily regarding her clinical state. Patient is hospital Day 25. She denies any fever, chills, chest pain, or shortness of breath.    SUBJECTIVE / OVERNIGHT EVENTS:  No overnight events.  SPO2 91% on 5L NC.       MEDICATIONS  (STANDING):  ALPRAZolam 0.25 milliGRAM(s) Oral two times a day  aMIOdarone    Tablet 100 milliGRAM(s) Oral daily  apixaban 2.5 milliGRAM(s) Oral every 12 hours  melatonin 5 milliGRAM(s) Oral at bedtime  metoprolol tartrate 25 milliGRAM(s) Oral two times a day  pantoprazole   Suspension 40 milliGRAM(s) Oral daily  polyethylene glycol 3350 17 Gram(s) Oral daily  senna 2 Tablet(s) Oral at bedtime  thiamine 100 milliGRAM(s) Oral daily    MEDICATIONS  (PRN):  acetaminophen   Tablet .. 650 milliGRAM(s) Oral every 6 hours PRN Temp greater or equal to 38C (100.4F)    CAPILLARY BLOOD GLUCOSE      Review of Systems    Constitutional: (-) fever  Eyes/ENT: (-) blurry vision, (-) epistaxis  Cardiovascular: (-) chest pain, (-) syncope  Respiratory: (-) cough, (-) shortness of breath  Gastrointestinal: (-) vomiting, (-) diarrhea  Musculoskeletal: (-) neck pain, (-) back pain, (-) joint pain  Integumentary: (-) rash, (-) edema  Neurological: (-) headache, (-) altered mental status  Psychiatric: (-) hallucinations  Allergic/Immunologic: (-) pruritus      PHYSICAL EXAM:  Vital Signs Last 24 Hrs  T(C): 36.4 (16 Apr 2020 04:00), Max: 36.6 (15 Apr 2020 16:00)  T(F): 97.6 (16 Apr 2020 04:00), Max: 97.8 (15 Apr 2020 16:00)  HR: 84 (16 Apr 2020 04:00) (76 - 98)  BP: 120/73 (16 Apr 2020 04:00) (102/66 - 120/73)  RR: 18 (16 Apr 2020 04:00) (16 - 22)  SpO2: 94% (16 Apr 2020 04:00) (92% - 94%)    Gen: Alert, NAD, well appearing  Head: NC, AT, PERRL, EOMI, normal lids/conjunctiva  Neck: +supple, no tenderness/meningismus/JVD, +Trachea midline  Pulm: Bilateral BS, normal resp effort, no wheeze/stridor/retractions  CV: RRR, no M/R/G, +dist pulses  Mskel: no edema/erythema/cyanosis  Skin: no rash, warm/dry  Neuro: AAOx3, no sensory/motor deficits, CN 2-12 intact        LABS:                        12.3   9.03  )-----------( 355      ( 16 Apr 2020 07:45 )             37.6     04-16    140  |  101  |  10  ----------------------------<  103<H>  4.4   |  25  |  0.7    Ca    9.5      16 Apr 2020 07:45  Mg     2.0     04-16    TPro  6.6  /  Alb  3.5  /  TBili  0.5  /  DBili  x   /  AST  20  /  ALT  35  /  AlkPhos  111  04-16          COVID-19 PCR: Detected (22 Mar 2020 13:20)      RADIOLOGY & ADDITIONAL TESTS:  Imaging from Last 24 Hours:  EXAM:  XR CHEST PORTABLE URGENT 1V            PROCEDURE DATE:  04/14/2020        INTERPRETATION:  Clinical History / Reason for exam: Hypoxia.    Comparison : Chest radiograph April 8, 2020.    Technique/Positioning: Frontal portable.    Findings:    Support devices: None.    Cardiac/mediastinum/hilum: Unremarkable.    Lung parenchyma/Pleura: Patchy bilateral opacities.    Skeleton/soft tissues: Unchanged.    Impression:      Patchy bilateral opacities, improved.        MOOKIE BYRD M.D., ATTENDING RADIOLOGIST  This document has been electronically signed. Apr 14 2020  2:51PM                COORDINATION OF CARE:  Care Discussed with Consultants/Other Providers:

## 2020-04-16 NOTE — PROGRESS NOTE ADULT - REASON FOR ADMISSION
covid r/o given fevers, non productive cough

## 2020-04-16 NOTE — DISCHARGE NOTE NURSING/CASE MANAGEMENT/SOCIAL WORK - PATIENT PORTAL LINK FT
You can access the FollowMyHealth Patient Portal offered by [facility] by registering at the following website: [email or web address]. By joining [employer]’s FollowMyHealth portal, you will also be able to view your health information using other applications (apps) compatible with our system.

## 2020-04-16 NOTE — OCCUPATIONAL THERAPY INITIAL EVALUATION ADULT - RANGE OF MOTION EXAMINATION, UPPER EXTREMITY
bilateral UE Passive ROM was WNL (within normal limits)/RUE AROM @ shoulder 1/2 range with all distal joints WNL.  LUE AROM @ shoulder 1/3 range with all distal joints WNL

## 2020-04-16 NOTE — OCCUPATIONAL THERAPY INITIAL EVALUATION ADULT - LEVEL OF INDEPENDENCE: TOILET, REHAB EVAL
PsO2 71%, heart rate 117 with activity.  Improved to PsO2 88% heart rate 107  with rest/moderate assist (50% patients effort)

## 2020-04-16 NOTE — OCCUPATIONAL THERAPY INITIAL EVALUATION ADULT - PLANNED THERAPY INTERVENTIONS, OT EVAL
balance training/ADL retraining/bed mobility training/strengthening/transfer training/cognitive, visual perceptual

## 2020-04-17 LAB
CRP SERPL-MCNC: 4.17 MG/DL — HIGH (ref 0–0.4)
PROCALCITONIN SERPL-MCNC: 0.05 NG/ML — SIGNIFICANT CHANGE UP (ref 0.02–0.1)

## 2020-04-23 DIAGNOSIS — I10 ESSENTIAL (PRIMARY) HYPERTENSION: ICD-10-CM

## 2020-04-23 DIAGNOSIS — J69.0 PNEUMONITIS DUE TO INHALATION OF FOOD AND VOMIT: ICD-10-CM

## 2020-04-23 DIAGNOSIS — A41.89 OTHER SPECIFIED SEPSIS: ICD-10-CM

## 2020-04-23 DIAGNOSIS — J12.89 OTHER VIRAL PNEUMONIA: ICD-10-CM

## 2020-04-23 DIAGNOSIS — R65.21 SEVERE SEPSIS WITH SEPTIC SHOCK: ICD-10-CM

## 2020-04-23 DIAGNOSIS — I48.20 CHRONIC ATRIAL FIBRILLATION, UNSPECIFIED: ICD-10-CM

## 2020-04-23 DIAGNOSIS — U07.1 COVID-19: ICD-10-CM

## 2020-04-23 DIAGNOSIS — Z88.2 ALLERGY STATUS TO SULFONAMIDES: ICD-10-CM

## 2020-04-23 DIAGNOSIS — E87.2 ACIDOSIS: ICD-10-CM

## 2020-04-23 DIAGNOSIS — J80 ACUTE RESPIRATORY DISTRESS SYNDROME: ICD-10-CM

## 2020-04-23 DIAGNOSIS — E87.0 HYPEROSMOLALITY AND HYPERNATREMIA: ICD-10-CM

## 2020-04-23 DIAGNOSIS — E87.6 HYPOKALEMIA: ICD-10-CM

## 2020-06-23 ENCOUNTER — OUTPATIENT (OUTPATIENT)
Dept: OUTPATIENT SERVICES | Facility: HOSPITAL | Age: 70
LOS: 1 days | Discharge: HOME | End: 2020-06-23
Payer: MEDICARE

## 2020-06-23 DIAGNOSIS — U07.1 COVID-19: ICD-10-CM

## 2020-06-23 PROBLEM — I48.91 UNSPECIFIED ATRIAL FIBRILLATION: Chronic | Status: ACTIVE | Noted: 2020-03-22

## 2020-06-23 PROCEDURE — 71046 X-RAY EXAM CHEST 2 VIEWS: CPT | Mod: 26,CS

## 2020-09-30 ENCOUNTER — RESULT REVIEW (OUTPATIENT)
Age: 70
End: 2020-09-30

## 2020-09-30 ENCOUNTER — OUTPATIENT (OUTPATIENT)
Dept: OUTPATIENT SERVICES | Facility: HOSPITAL | Age: 70
LOS: 1 days | Discharge: HOME | End: 2020-09-30
Payer: MEDICARE

## 2020-09-30 DIAGNOSIS — Z12.31 ENCOUNTER FOR SCREENING MAMMOGRAM FOR MALIGNANT NEOPLASM OF BREAST: ICD-10-CM

## 2020-09-30 DIAGNOSIS — R92.2 INCONCLUSIVE MAMMOGRAM: ICD-10-CM

## 2020-09-30 PROCEDURE — 77067 SCR MAMMO BI INCL CAD: CPT | Mod: 26

## 2020-09-30 PROCEDURE — 76641 ULTRASOUND BREAST COMPLETE: CPT | Mod: 26,50

## 2020-09-30 PROCEDURE — 77063 BREAST TOMOSYNTHESIS BI: CPT | Mod: 26

## 2021-08-25 NOTE — PROGRESS NOTE ADULT - RESPIRATORY

## 2021-10-01 ENCOUNTER — RESULT REVIEW (OUTPATIENT)
Age: 71
End: 2021-10-01

## 2021-10-01 ENCOUNTER — OUTPATIENT (OUTPATIENT)
Dept: OUTPATIENT SERVICES | Facility: HOSPITAL | Age: 71
LOS: 1 days | Discharge: HOME | End: 2021-10-01
Payer: MEDICARE

## 2021-10-01 DIAGNOSIS — R92.2 INCONCLUSIVE MAMMOGRAM: ICD-10-CM

## 2021-10-01 DIAGNOSIS — Z12.31 ENCOUNTER FOR SCREENING MAMMOGRAM FOR MALIGNANT NEOPLASM OF BREAST: ICD-10-CM

## 2021-10-01 PROCEDURE — 76641 ULTRASOUND BREAST COMPLETE: CPT | Mod: 26,50

## 2021-10-01 PROCEDURE — 77067 SCR MAMMO BI INCL CAD: CPT | Mod: 26

## 2021-10-01 PROCEDURE — 77063 BREAST TOMOSYNTHESIS BI: CPT | Mod: 26

## 2022-02-22 ENCOUNTER — APPOINTMENT (OUTPATIENT)
Dept: OBGYN | Facility: CLINIC | Age: 72
End: 2022-02-22
Payer: MEDICARE

## 2022-02-22 PROCEDURE — G0101: CPT

## 2022-03-07 ENCOUNTER — APPOINTMENT (OUTPATIENT)
Dept: OBGYN | Facility: CLINIC | Age: 72
End: 2022-03-07
Payer: MEDICARE

## 2022-03-07 PROCEDURE — 99214 OFFICE O/P EST MOD 30 MIN: CPT | Mod: 25

## 2022-03-07 PROCEDURE — 76830 TRANSVAGINAL US NON-OB: CPT

## 2022-05-03 ENCOUNTER — OUTPATIENT (OUTPATIENT)
Dept: OUTPATIENT SERVICES | Facility: HOSPITAL | Age: 72
LOS: 1 days | Discharge: HOME | End: 2022-05-03
Payer: MEDICARE

## 2022-05-03 DIAGNOSIS — J01.90 ACUTE SINUSITIS, UNSPECIFIED: ICD-10-CM

## 2022-05-03 PROCEDURE — 70220 X-RAY EXAM OF SINUSES: CPT | Mod: 26

## 2022-10-11 ENCOUNTER — RESULT REVIEW (OUTPATIENT)
Age: 72
End: 2022-10-11

## 2022-10-11 ENCOUNTER — OUTPATIENT (OUTPATIENT)
Dept: OUTPATIENT SERVICES | Facility: HOSPITAL | Age: 72
LOS: 1 days | Discharge: HOME | End: 2022-10-11

## 2022-10-11 DIAGNOSIS — Z12.31 ENCOUNTER FOR SCREENING MAMMOGRAM FOR MALIGNANT NEOPLASM OF BREAST: ICD-10-CM

## 2022-10-11 DIAGNOSIS — R92.2 INCONCLUSIVE MAMMOGRAM: ICD-10-CM

## 2022-10-11 PROCEDURE — 77067 SCR MAMMO BI INCL CAD: CPT | Mod: 26

## 2022-10-11 PROCEDURE — 77063 BREAST TOMOSYNTHESIS BI: CPT | Mod: 26

## 2022-10-11 PROCEDURE — 76641 ULTRASOUND BREAST COMPLETE: CPT | Mod: 26,50

## 2022-10-17 NOTE — PROGRESS NOTE ADULT - ASSESSMENT
trying to rescheduled for earlier that day no answer LVM   IMPRESSION:    Acute Hypoxemic Respiratory Failure 2/2 COVID 19  ARDS  Sepsis present on admission  Septic shock   H/O Afib on Eliquis    PLAN:    CNS: Continue sedation.  MSO4 2 mg per hr    HEENT: ET care ,oral care    PULMONARY:  HOB @ 45 degrees. Aspiration precautions. Vent changes as follows: ARDS network protocol/.  Wean O2.  ABG when Sats around 94-95    CARDIOVASCULAR: Avoid volume overload.  FU QTC.  Amiodarone oral.  Wean Levophed      GI: GI prophylaxis.  OG feeding, Bowel regimen.      RENAL:  Follow up lytes.  Correct as needed    INFECTIOUS DISEASE: Follow up cultures.      HEMATOLOGICAL:  DVT prophylaxis / Therapy.  On LMWH     ENDOCRINE:  Follow up FS.  Insulin protocol if needed.  Solumedrol D#2    MUSCULOSKELETAL: Bedrest    Lines: TLC     Disposition: MICU monitoring

## 2022-12-20 NOTE — CONSULT NOTE ADULT - RESPIRATORY
Detail Level: Zone Sunscreen Recommendations: I recommend Sunscreen with Zinc, at least SPF#30 and reapply every 2 hours with any sun exposure. detailed exam Detail Level: Detailed

## 2023-03-02 ENCOUNTER — APPOINTMENT (OUTPATIENT)
Dept: OBGYN | Facility: CLINIC | Age: 73
End: 2023-03-02
Payer: MEDICARE

## 2023-03-02 ENCOUNTER — APPOINTMENT (OUTPATIENT)
Dept: OBGYN | Facility: CLINIC | Age: 73
End: 2023-03-02

## 2023-03-02 DIAGNOSIS — R10.2 PELVIC AND PERINEAL PAIN: ICD-10-CM

## 2023-03-02 PROCEDURE — 99213 OFFICE O/P EST LOW 20 MIN: CPT

## 2023-03-02 PROCEDURE — 76830 TRANSVAGINAL US NON-OB: CPT

## 2023-03-02 NOTE — HISTORY OF PRESENT ILLNESS
[FreeTextEntry1] : ---- 74 Y/O HERE C/O PELVIC PAIN ON AND OFF\par PMHX;/ATRIAL FIB\par PSHX;/GB\par SOCIAL HX;-ETOH -CIGG \par STD;   /        DISCUSSED CONDOMS\par FAMILY HISTORY OF BREAST CANCER\par REVIEW OF SYMPTOMS DONE;\par ALLERGIES; pt answered AUGMENIN\par Medication reconciliation was completed by reviewing, with the patient's\par involvement, the patient's current outpatient medications and those \par ordered for the patient today. \par \par PE;ABDOMEN SOFT NT ND\par NL SIZE NT\par ADNEXA NT - MASSES\par EXT -CCE\par \par A;P;PELVIC PAIN\par -SONO REVIEWED\par -F-U PRN.

## 2023-03-02 NOTE — HISTORY OF PRESENT ILLNESS
[FreeTextEntry1] : ---- 72 Y/O HERE C/O PELVIC PAIN ON AND OFF\par PMHX;/ATRIAL FIB\par PSHX;/GB\par SOCIAL HX;-ETOH -CIGG \par STD;   /        DISCUSSED CONDOMS\par FAMILY HISTORY OF BREAST CANCER\par REVIEW OF SYMPTOMS DONE;\par ALLERGIES; pt answered AUGMENIN\par Medication reconciliation was completed by reviewing, with the patient's\par involvement, the patient's current outpatient medications and those \par ordered for the patient today. \par \par PE;ABDOMEN SOFT NT ND\par NL SIZE NT\par ADNEXA NT - MASSES\par EXT -CCE\par \par A;P;PELVIC PAIN\par -SONO REVIEWED\par -F-U PRN.

## 2023-06-13 NOTE — ED ADULT TRIAGE NOTE - SPO2 (%)
Progress Note     Patient: Damon Rod               Sex: male           MRN: [id number]       YOB: 1975      Age:  48 year old               Subjective:  Patient denies shortness of breath, chest pain, nausea or vomiting.   Objective:  Physical Exam:   Visit Vitals  /64 (BP Location: LUE - Left upper extremity)   Pulse 75   Temp 98.1 °F (36.7 °C) (Oral)   Resp 16   Wt 92.5 kg (203 lb 14.8 oz)   SpO2 100%   BMI 30.11 kg/m²       I/O last 3 completed shifts:  In: 650 [P.O.:240; I.V.:410]  Out: 1750 [Urine:650; Other:1000; Stool:100]    Physical Exam:  General Appearance: Alert and oriented, no distress   Pulmonary:   Clear to auscultation bilaterally, respirations unlabored   Cardiovascular:  Regular rate and rhythm, no murmur, gallop or rub.    Gastrointestinal:   Soft, non-distended, bowel sounds active, diverting loop transverse colostomy noted   Musculoskeletal: No edema   HD Access: [facility]     Lab/Data Reviewed:  Recent Labs   Lab 06/13/23 0455 06/12/23 0450 06/11/23  0530 06/10/23  0454 06/09/23  0513   SODIUM 138 134* 135 135 135   POTASSIUM 3.9 4.3 4.1 4.0 4.3   CHLORIDE 103 105 101 101 102   CO2 29 26 29 30 27   BUN 25* 40* 27* 17 28*   CREATININE 2.24* 3.45* 2.98* 2.35* 2.99*   GLUCOSE 195* 250* 117* 91 103*   CALCIUM 9.4 9.1 9.1 9.2 9.3   PHOS  --  3.8  --   --   --    MG  --  2.0  --   --   --       Recent Labs   Lab 06/13/23 0455 06/12/23 0450 06/11/23  0530   WBC 8.8 9.5 9.1   HGB 8.2* 7.5* 7.6*   HCT 27.5* 25.9* 26.2*    354 372        Current and past Medications Reviewed.     Assessment/Plan:  1. End Stage Renal Disease/Hypertension with Hypertensive CKD stage 5/Dependence on dialysis  -Currently on Monday, Wednesday, Friday regimen.      2. Hyponatremia  -This is stable, maintain at > 130 mmol/L    3. Hypertension with ESRD,  status post stroke  -BP stable, will monitor for stability given need for ultrafiltration with dialysis    4. Anemia of chronic kidney  disease  -continue AJIT   -transfuse as needed    5. Secondary hyperparathyroidism of renal origin  -monitor phosph and calcium    6. Sacral wound -wound debridement on June 12, 2023  -Status post laparoscopic transverse loop colostomy    6/13/2023 11:14 AM  [facility]  Phone: [phone]  Fax: [phone]      97

## 2023-06-27 ENCOUNTER — EMERGENCY (EMERGENCY)
Facility: HOSPITAL | Age: 73
LOS: 0 days | Discharge: HOME | End: 2023-06-28
Attending: STUDENT IN AN ORGANIZED HEALTH CARE EDUCATION/TRAINING PROGRAM
Payer: MEDICARE

## 2023-06-27 VITALS
TEMPERATURE: 97 F | DIASTOLIC BLOOD PRESSURE: 64 MMHG | WEIGHT: 125 LBS | SYSTOLIC BLOOD PRESSURE: 112 MMHG | OXYGEN SATURATION: 99 % | HEART RATE: 88 BPM | RESPIRATION RATE: 18 BRPM | HEIGHT: 64 IN

## 2023-06-27 DIAGNOSIS — R55 SYNCOPE AND COLLAPSE: ICD-10-CM

## 2023-06-27 DIAGNOSIS — Z79.01 LONG TERM (CURRENT) USE OF ANTICOAGULANTS: ICD-10-CM

## 2023-06-27 DIAGNOSIS — Z88.2 ALLERGY STATUS TO SULFONAMIDES: ICD-10-CM

## 2023-06-27 DIAGNOSIS — I48.91 UNSPECIFIED ATRIAL FIBRILLATION: ICD-10-CM

## 2023-06-27 LAB
ALBUMIN SERPL ELPH-MCNC: 3.7 G/DL — SIGNIFICANT CHANGE UP (ref 3.5–5.2)
ALP SERPL-CCNC: 82 U/L — SIGNIFICANT CHANGE UP (ref 30–115)
ALT FLD-CCNC: 23 U/L — SIGNIFICANT CHANGE UP (ref 0–41)
ANION GAP SERPL CALC-SCNC: 12 MMOL/L — SIGNIFICANT CHANGE UP (ref 7–14)
APPEARANCE UR: CLEAR — SIGNIFICANT CHANGE UP
APTT BLD: 32.7 SEC — SIGNIFICANT CHANGE UP (ref 27–39.2)
AST SERPL-CCNC: 26 U/L — SIGNIFICANT CHANGE UP (ref 0–41)
BASOPHILS # BLD AUTO: 0.06 K/UL — SIGNIFICANT CHANGE UP (ref 0–0.2)
BASOPHILS NFR BLD AUTO: 0.7 % — SIGNIFICANT CHANGE UP (ref 0–1)
BILIRUB SERPL-MCNC: 1 MG/DL — SIGNIFICANT CHANGE UP (ref 0.2–1.2)
BILIRUB UR-MCNC: NEGATIVE — SIGNIFICANT CHANGE UP
BUN SERPL-MCNC: 18 MG/DL — SIGNIFICANT CHANGE UP (ref 10–20)
CALCIUM SERPL-MCNC: 9 MG/DL — SIGNIFICANT CHANGE UP (ref 8.4–10.4)
CHLORIDE SERPL-SCNC: 106 MMOL/L — SIGNIFICANT CHANGE UP (ref 98–110)
CO2 SERPL-SCNC: 23 MMOL/L — SIGNIFICANT CHANGE UP (ref 17–32)
COLOR SPEC: COLORLESS — SIGNIFICANT CHANGE UP
CREAT SERPL-MCNC: 0.8 MG/DL — SIGNIFICANT CHANGE UP (ref 0.7–1.5)
DIFF PNL FLD: NEGATIVE — SIGNIFICANT CHANGE UP
EGFR: 77 ML/MIN/1.73M2 — SIGNIFICANT CHANGE UP
EOSINOPHIL # BLD AUTO: 0.18 K/UL — SIGNIFICANT CHANGE UP (ref 0–0.7)
EOSINOPHIL NFR BLD AUTO: 2.1 % — SIGNIFICANT CHANGE UP (ref 0–8)
ETHANOL SERPL-MCNC: <10 MG/DL — SIGNIFICANT CHANGE UP
GLUCOSE SERPL-MCNC: 104 MG/DL — HIGH (ref 70–99)
GLUCOSE UR QL: NEGATIVE — SIGNIFICANT CHANGE UP
HCT VFR BLD CALC: 40.2 % — SIGNIFICANT CHANGE UP (ref 37–47)
HGB BLD-MCNC: 13.1 G/DL — SIGNIFICANT CHANGE UP (ref 12–16)
IMM GRANULOCYTES NFR BLD AUTO: 0.4 % — HIGH (ref 0.1–0.3)
INR BLD: 1.07 RATIO — SIGNIFICANT CHANGE UP (ref 0.65–1.3)
KETONES UR-MCNC: NEGATIVE — SIGNIFICANT CHANGE UP
LACTATE SERPL-SCNC: 0.9 MMOL/L — SIGNIFICANT CHANGE UP (ref 0.7–2)
LEUKOCYTE ESTERASE UR-ACNC: NEGATIVE — SIGNIFICANT CHANGE UP
LIDOCAIN IGE QN: 58 U/L — SIGNIFICANT CHANGE UP (ref 7–60)
LYMPHOCYTES # BLD AUTO: 1.6 K/UL — SIGNIFICANT CHANGE UP (ref 1.2–3.4)
LYMPHOCYTES # BLD AUTO: 18.9 % — LOW (ref 20.5–51.1)
MCHC RBC-ENTMCNC: 29.3 PG — SIGNIFICANT CHANGE UP (ref 27–31)
MCHC RBC-ENTMCNC: 32.6 G/DL — SIGNIFICANT CHANGE UP (ref 32–37)
MCV RBC AUTO: 89.9 FL — SIGNIFICANT CHANGE UP (ref 81–99)
MONOCYTES # BLD AUTO: 0.9 K/UL — HIGH (ref 0.1–0.6)
MONOCYTES NFR BLD AUTO: 10.6 % — HIGH (ref 1.7–9.3)
NEUTROPHILS # BLD AUTO: 5.69 K/UL — SIGNIFICANT CHANGE UP (ref 1.4–6.5)
NEUTROPHILS NFR BLD AUTO: 67.3 % — SIGNIFICANT CHANGE UP (ref 42.2–75.2)
NITRITE UR-MCNC: NEGATIVE — SIGNIFICANT CHANGE UP
NRBC # BLD: 0 /100 WBCS — SIGNIFICANT CHANGE UP (ref 0–0)
PH UR: 6.5 — SIGNIFICANT CHANGE UP (ref 5–8)
PLATELET # BLD AUTO: 295 K/UL — SIGNIFICANT CHANGE UP (ref 130–400)
PMV BLD: 10.9 FL — HIGH (ref 7.4–10.4)
POTASSIUM SERPL-MCNC: 3.8 MMOL/L — SIGNIFICANT CHANGE UP (ref 3.5–5)
POTASSIUM SERPL-SCNC: 3.8 MMOL/L — SIGNIFICANT CHANGE UP (ref 3.5–5)
PROT SERPL-MCNC: 6 G/DL — SIGNIFICANT CHANGE UP (ref 6–8)
PROT UR-MCNC: NEGATIVE — SIGNIFICANT CHANGE UP
PROTHROM AB SERPL-ACNC: 12.2 SEC — SIGNIFICANT CHANGE UP (ref 9.95–12.87)
RBC # BLD: 4.47 M/UL — SIGNIFICANT CHANGE UP (ref 4.2–5.4)
RBC # FLD: 13.4 % — SIGNIFICANT CHANGE UP (ref 11.5–14.5)
SODIUM SERPL-SCNC: 141 MMOL/L — SIGNIFICANT CHANGE UP (ref 135–146)
SP GR SPEC: 1 — LOW (ref 1.01–1.03)
TROPONIN T SERPL-MCNC: <0.01 NG/ML — SIGNIFICANT CHANGE UP
TROPONIN T SERPL-MCNC: <0.01 NG/ML — SIGNIFICANT CHANGE UP
UROBILINOGEN FLD QL: SIGNIFICANT CHANGE UP
WBC # BLD: 8.46 K/UL — SIGNIFICANT CHANGE UP (ref 4.8–10.8)
WBC # FLD AUTO: 8.46 K/UL — SIGNIFICANT CHANGE UP (ref 4.8–10.8)

## 2023-06-27 PROCEDURE — 74177 CT ABD & PELVIS W/CONTRAST: CPT | Mod: 26,MA

## 2023-06-27 PROCEDURE — 93306 TTE W/DOPPLER COMPLETE: CPT

## 2023-06-27 PROCEDURE — 72170 X-RAY EXAM OF PELVIS: CPT

## 2023-06-27 PROCEDURE — 99223 1ST HOSP IP/OBS HIGH 75: CPT | Mod: FS

## 2023-06-27 PROCEDURE — 82962 GLUCOSE BLOOD TEST: CPT

## 2023-06-27 PROCEDURE — G0378: CPT

## 2023-06-27 PROCEDURE — 93010 ELECTROCARDIOGRAM REPORT: CPT | Mod: 77

## 2023-06-27 PROCEDURE — 70450 CT HEAD/BRAIN W/O DYE: CPT | Mod: MA

## 2023-06-27 PROCEDURE — 36415 COLL VENOUS BLD VENIPUNCTURE: CPT

## 2023-06-27 PROCEDURE — 83690 ASSAY OF LIPASE: CPT

## 2023-06-27 PROCEDURE — 99284 EMERGENCY DEPT VISIT MOD MDM: CPT

## 2023-06-27 PROCEDURE — 99285 EMERGENCY DEPT VISIT HI MDM: CPT | Mod: 25

## 2023-06-27 PROCEDURE — 71260 CT THORAX DX C+: CPT | Mod: 26,MA

## 2023-06-27 PROCEDURE — 84484 ASSAY OF TROPONIN QUANT: CPT | Mod: 59

## 2023-06-27 PROCEDURE — 72125 CT NECK SPINE W/O DYE: CPT | Mod: MA

## 2023-06-27 PROCEDURE — 80307 DRUG TEST PRSMV CHEM ANLYZR: CPT

## 2023-06-27 PROCEDURE — 85610 PROTHROMBIN TIME: CPT

## 2023-06-27 PROCEDURE — 81003 URINALYSIS AUTO W/O SCOPE: CPT

## 2023-06-27 PROCEDURE — 71045 X-RAY EXAM CHEST 1 VIEW: CPT

## 2023-06-27 PROCEDURE — 70450 CT HEAD/BRAIN W/O DYE: CPT | Mod: 26,MA

## 2023-06-27 PROCEDURE — 74177 CT ABD & PELVIS W/CONTRAST: CPT | Mod: MA

## 2023-06-27 PROCEDURE — 71260 CT THORAX DX C+: CPT | Mod: MA

## 2023-06-27 PROCEDURE — 93010 ELECTROCARDIOGRAM REPORT: CPT

## 2023-06-27 PROCEDURE — 72170 X-RAY EXAM OF PELVIS: CPT | Mod: 26

## 2023-06-27 PROCEDURE — 85730 THROMBOPLASTIN TIME PARTIAL: CPT

## 2023-06-27 PROCEDURE — 85025 COMPLETE CBC W/AUTO DIFF WBC: CPT

## 2023-06-27 PROCEDURE — 80053 COMPREHEN METABOLIC PANEL: CPT

## 2023-06-27 PROCEDURE — 71045 X-RAY EXAM CHEST 1 VIEW: CPT | Mod: 26

## 2023-06-27 PROCEDURE — 93005 ELECTROCARDIOGRAM TRACING: CPT

## 2023-06-27 PROCEDURE — 72125 CT NECK SPINE W/O DYE: CPT | Mod: 26,MA

## 2023-06-27 PROCEDURE — 83605 ASSAY OF LACTIC ACID: CPT

## 2023-06-27 RX ORDER — METOPROLOL TARTRATE 50 MG
25 TABLET ORAL DAILY
Refills: 0 | Status: DISCONTINUED | OUTPATIENT
Start: 2023-06-27 | End: 2023-06-28

## 2023-06-27 RX ORDER — SODIUM CHLORIDE 9 MG/ML
1000 INJECTION INTRAMUSCULAR; INTRAVENOUS; SUBCUTANEOUS ONCE
Refills: 0 | Status: COMPLETED | OUTPATIENT
Start: 2023-06-27 | End: 2023-06-27

## 2023-06-27 RX ORDER — APIXABAN 2.5 MG/1
2.5 TABLET, FILM COATED ORAL ONCE
Refills: 0 | Status: COMPLETED | OUTPATIENT
Start: 2023-06-27 | End: 2023-06-27

## 2023-06-27 RX ADMIN — SODIUM CHLORIDE 1000 MILLILITER(S): 9 INJECTION INTRAMUSCULAR; INTRAVENOUS; SUBCUTANEOUS at 15:33

## 2023-06-27 NOTE — CONSULT NOTE ADULT - SUBJECTIVE AND OBJECTIVE BOX
TRAUMA ACTIVATION LEVEL:  CODE / ALERT  / CONSULT  ACTIVATED BY: EMS**  /  ED**  INTUBATED: YES** / NO**    MECHANISM OF INJURY:   [] Blunt     [] MVC	  [x] Fall	  [] Pedestrian Struck	  [] Motorcycle     [] Assault     [] Bicycle collision    [] Sports injury    [] Penetrating    [] Gun Shot Wound      [] Stab Wound    GCS: 15 	E: 4	V: 5	M: 6    HPI: "73-year-old female, past medical history of atrial fibrillation on Eliquis, presents emergency department status post syncopal episode just prior to arrival.  It was witnessed by her  who states she most likely hit her head.  She had preceding lightheadedness, anxiety, and shortness of breath.  She has no complaints at this time."    Trauma assessment in ED: ABCs intact , GCS 15 , AAOx3.    Obvious external signs of injury: None    PAST MEDICAL & SURGICAL HISTORY:  Afib      No significant past surgical history        Allergies    Originally Entered as [Unknown] reaction to [green pepper] (Unknown)  Originally Entered as [Unknown] reaction to [pepper] (Unknown)  sulfa drugs (Unknown)    Intolerances      Home Medications:  acetaminophen 325 mg oral tablet: 2 tab(s) orally every 6 hours, As needed, Temp greater or equal to 38C (100.4F) (16 Apr 2020 16:00)  apixaban 2.5 mg oral tablet: 1 tab(s) orally 2 times a day (22 Mar 2020 20:54)  metoprolol tartrate 25 mg oral tablet: 1 tab(s) orally 2 times a day (22 Mar 2020 20:53)      ROS: 10-system review is otherwise negative except HPI above.      Primary Survey:    A - airway intact  B - bilateral breath sounds and good chest rise  C - palpable pulses in all extremities  D - GCS 15 on arrival, ESPINAL  Exposure obtained    Vital Signs Last 24 Hrs  T(C): 36.1 (27 Jun 2023 15:00), Max: 36.1 (27 Jun 2023 15:00)  T(F): 97 (27 Jun 2023 15:00), Max: 97 (27 Jun 2023 15:00)  HR: 89 (27 Jun 2023 15:20) (88 - 89)  BP: 104/57 (27 Jun 2023 15:20) (104/57 - 112/64)  BP(mean): --  RR: 18 (27 Jun 2023 15:20) (18 - 18)  SpO2: 98% (27 Jun 2023 15:20) (98% - 99%)    Parameters below as of 27 Jun 2023 15:20  Patient On (Oxygen Delivery Method): room air        Secondary Survey:   General: NAD  HEENT: Normocephalic, atraumatic, EOMI, PEERLA. no scalp lacerations   Neck: Soft, midline trachea. no c-spine tenderness  Chest: No chest wall tenderness, no subcutaneous emphysema   Cardiac: S1, S2, RRR  Respiratory: Bilateral breath sounds, clear and equal bilaterally  Abdomen: Soft, non-distended, non-tender, no rebound, no guarding.  Groin: Normal appearing, pelvis stable   Ext:  Moving b/l upper and lower extremities. Palpable Radial b/l UE, b/l DP palpable in LE.   Back: No T/L/S spine tenderness, No palpable runoff/stepoff/deformity      FAST:     Labs:  CAPILLARY BLOOD GLUCOSE                      LFTs:         Coags:                        RADIOLOGY & ADDITIONAL STUDIES:  ---------------------------------------------------------------------------------------   TRAUMA ACTIVATION LEVEL:  CODE / ALERT  / CONSULT  ACTIVATED BY: EMS**  /  ED**  INTUBATED: YES** / NO**    MECHANISM OF INJURY:   [] Blunt     [] MVC	  [x] Fall	  [] Pedestrian Struck	  [] Motorcycle     [] Assault     [] Bicycle collision    [] Sports injury    [] Penetrating    [] Gun Shot Wound      [] Stab Wound    GCS: 15 	E: 4	V: 5	M: 6    HPI: "73-year-old female, past medical history of atrial fibrillation on Eliquis, presents emergency department status post syncopal episode just prior to arrival.  It was witnessed by her  who states she most likely hit her head.  She had preceding lightheadedness, anxiety, and shortness of breath.  She has no complaints at this time."    Trauma assessment in ED: ABCs intact , GCS 15 , AAOx3.    Obvious external signs of injury: None    PAST MEDICAL & SURGICAL HISTORY:  Afib      No significant past surgical history        Allergies    Originally Entered as [Unknown] reaction to [green pepper] (Unknown)  Originally Entered as [Unknown] reaction to [pepper] (Unknown)  sulfa drugs (Unknown)    Intolerances      Home Medications:  acetaminophen 325 mg oral tablet: 2 tab(s) orally every 6 hours, As needed, Temp greater or equal to 38C (100.4F) (16 Apr 2020 16:00)  apixaban 2.5 mg oral tablet: 1 tab(s) orally 2 times a day (22 Mar 2020 20:54)  metoprolol tartrate 25 mg oral tablet: 1 tab(s) orally 2 times a day (22 Mar 2020 20:53)      ROS: 10-system review is otherwise negative except HPI above.      Primary Survey:    A - airway intact  B - bilateral breath sounds and good chest rise  C - palpable pulses in all extremities  D - GCS 15 on arrival, ESPINAL  Exposure obtained    Vital Signs Last 24 Hrs  T(C): 36.1 (27 Jun 2023 15:00), Max: 36.1 (27 Jun 2023 15:00)  T(F): 97 (27 Jun 2023 15:00), Max: 97 (27 Jun 2023 15:00)  HR: 89 (27 Jun 2023 15:20) (88 - 89)  BP: 104/57 (27 Jun 2023 15:20) (104/57 - 112/64)  BP(mean): --  RR: 18 (27 Jun 2023 15:20) (18 - 18)  SpO2: 98% (27 Jun 2023 15:20) (98% - 99%)    Parameters below as of 27 Jun 2023 15:20  Patient On (Oxygen Delivery Method): room air        Secondary Survey:   General: NAD  HEENT: Normocephalic, atraumatic, EOMI, PEERLA. no scalp lacerations   Neck: Soft, midline trachea. no c-spine tenderness  Chest: No chest wall tenderness, no subcutaneous emphysema   Cardiac: S1, S2, RRR  Respiratory: Bilateral breath sounds, clear and equal bilaterally  Abdomen: Soft, non-distended, non-tender, no rebound, no guarding.  Groin: Normal appearing, pelvis stable   Ext:  Moving b/l upper and lower extremities. Palpable Radial b/l UE, b/l DP palpable in LE.   Back: No T/L/S spine tenderness, No palpable runoff/stepoff/deformity      FAST:     Labs:  CAPILLARY BLOOD GLUCOSE                      LFTs:         Coags:                        RADIOLOGY & ADDITIONAL STUDIES:  ---------------------------------------------------------------------------------------  < from: CT Head No Cont (06.27.23 @ 16:05) >  IMPRESSION:    No evidence of acute intracranial pathology.    --- End of Report ---    < end of copied text >  < from: CT Cervical Spine No Cont (06.27.23 @ 16:11) >    IMPRESSION:    1.  No evidence of acute cervical spine fracture or subluxation.    2.  Mild degenerative changes.    3.  Erosive changes across the C3-4 right facet joint may be degenerative   versus inflammatory. Recommend correlation with point tenderness.    --- End of Report ---    < end of copied text >  < from: CT Abdomen and Pelvis w/ IV Cont (06.27.23 @ 16:17) >  IMPRESSION:    No evidence of thoracic, abdominal or pelvic visceral injury, laceration,   or hematoma.    No evidence of acute fracture.    --- End of Report ---    < end of copied text >

## 2023-06-27 NOTE — CONSULT NOTE ADULT - ASSESSMENT
ASSESSMENT:  73F w/ PMH of Afib on Eliquis who presents s/p syncopal fall +HT +LOC +AC, pending the following trauma workup    Injuries identified:   -   -   -     PLAN:   - Trauma Labs: (CBC, BMP, Coags, T&S, UA, EtOH level)  Additional studies:  EKG  Utox    Trauma Imaging to include the following:  - CXR, Pelvic Xray  - CT Head,  CT C-spine, CT Max/Face, CT Chest, CT Abd/Pelvis  - Extremity films: None        Disposition pending results of above labs and imaging  Above plan discussed with Trauma attending, Dr. Brewster, patient, patient family, and ED team  --------------------------------------------------------------------------------------  06-27-23 @ 17:26    TRAUMA SENIOR SPECTRA: [id number]  TRAUMA TEAM SPECTRA: [id number] ASSESSMENT:  73F w/ PMH of Afib on Eliquis who presents s/p syncopal fall +HT +LOC +AC, pending the following trauma workup    Injuries identified:   - None    PLAN:   CTs reviewed, as above. No acute traumatic findings  No acute trauma surgical intervention  Plain film wet reads as per ED  Dispo as per ED  If pt admitted, trauma team to perform TTS in AM  Surgical Attending aware and agrees with plan      Disposition pending results of above labs and imaging  Above plan discussed with Trauma attending, Dr. Brewster, patient, patient family, and ED team  --------------------------------------------------------------------------------------  06-27-23 @ 17:26    TRAUMA SENIOR SPECTRA: [id number]  TRAUMA TEAM SPECTRA: [id number]

## 2023-06-27 NOTE — ED ADULT NURSE NOTE - NSFALLRISKINTERV_ED_ALL_ED

## 2023-06-27 NOTE — ED CDU PROVIDER INITIAL DAY NOTE - CLINICAL SUMMARY MEDICAL DECISION MAKING FREE TEXT BOX
73-year-old female, past medical history of atrial fibrillation on Eliquis, presents emergency department status post syncopal episode just prior to arrival. In ED Trauma alert activated and PAN Scans performed which demonstrated < from: CT Head No Cont (06.27.23 @ 16:05) >No evidence of acute intracranial pathology.< from: CT Cervical Spine No Cont (06.27.23 @ 16:11) > 1.  No evidence of acute cervical spine fracture or subluxation.2.  Mild degenerative changes.< from: CT Chest w/ IV Cont (06.27.23 @ 16:17) >  No evidence of thoracic, abdominal or pelvic visceral injury, laceration, or hematoma.No evidence of acute fracture. troponin x 2 negative. Placed in obs for echo.

## 2023-06-27 NOTE — ED ADULT NURSE NOTE - CHIEF COMPLAINT QUOTE
s/p syncope while in the back yard, patient states she fell and hit her head, patient on Eliquis, trauma alert called to ED, FS in triage 120

## 2023-06-27 NOTE — ED ADULT TRIAGE NOTE - CHIEF COMPLAINT QUOTE
s/p syncope while in the back yard, patient states she fell and hit her head, patient on Eliquis, trauma alert called to ED s/p syncope while in the back yard, patient states she fell and hit her head, patient on Eliquis, trauma alert called to ED, FS in triage 120

## 2023-06-27 NOTE — ED ADULT NURSE NOTE - OBJECTIVE STATEMENT
Pt presents to ED s/p fall. pt had an episode of syncope  which she fell and hit her head. + for LOC, pt on eliquis. MD/trauma team  by bedside pt A&Ox4, calm, breathing with ease in Room air. V/S stable at this time.   nursing care contiues

## 2023-06-27 NOTE — ED PROVIDER NOTE - ATTENDING CONTRIBUTION TO CARE
73-year-old female to ED with syncope at home.  She does take Eliquis and unsure of how she fell.  Positive head strike no complaints of any upper extremity lower extremity trauma.  Otherwise well-appearing at baseline.  Trauma alert activated due to mechanism.    exam as noted, CTAB, RRR, abdomen soft NTND, (+) bowel sounds,

## 2023-06-27 NOTE — ED PROVIDER NOTE - PHYSICAL EXAMINATION
GENERAL: Well-developed; well-nourished; in no acute distress.   SKIN: warm, dry  HEAD: Normocephalic; atraumatic.  EYES: 3mm PERRLA, EOMI, no conjunctival erythema  ENT: No nasal discharge; airway clear.  NECK: Supple; non tender.  CARD: S1, S2 normal; no murmurs, gallops, or rubs. Regular rate and rhythm. 2/4 radial, PT and DP pulses  RESP: LCTAB; No wheezes, rales, rhonchi, or stridor.  ABD: soft, nontender, and nondistended  EXT: Normal ROM.  No LE TTP or edema bilaterally.  LYMPH: No acute cervical adenopathy.  NEURO: Alert, oriented, normal sensation, 5/5x4 strength, normal motor   PSYCH: Cooperative, appropriate.

## 2023-06-27 NOTE — ED PROVIDER NOTE - CLINICAL SUMMARY MEDICAL DECISION MAKING FREE TEXT BOX
Endorsed from Dr. Eatsman  72 yo woman w/ syncope on DOAC  Trauma team seen and recs in  Pending imaging, pt will require admit for syncope  no high risk features excepting for age, ECG w/o acute pathology  Labs pending, imaging pending and will reassess Endorsed from Dr. Eastman  72 yo woman w/ syncope on DOAC  Trauma team seen and recs in  Pending imaging, pt will require admit for syncope  no high risk features excepting for age, ECG w/o acute pathology  Labs pending, imaging pending and will reassess    No acute injuries on trauma scans.   Pt w/ no prodrome and no hx of syncope in past.  Echo 3 years prior  Given eval, will place on obs for further evaluation and monitoring for syncope

## 2023-06-27 NOTE — ED CDU PROVIDER INITIAL DAY NOTE - ATTENDING CONTRIBUTION TO CARE
I personally evaluated the patient. I reviewed the Resident’s or Physician Assistant’s note (as assigned above), and agree with the findings and plan except as documented in my note.  73-year-old female, past medical history of atrial fibrillation on Eliquis, presents emergency department status post syncopal episode just prior to arrival. In ED Trauma alert activated and PAN Scans performed which demonstrated < from: CT Head No Cont (06.27.23 @ 16:05) >No evidence of acute intracranial pathology.< from: CT Cervical Spine No Cont (06.27.23 @ 16:11) > 1.  No evidence of acute cervical spine fracture or subluxation.2.  Mild degenerative changes.< from: CT Chest w/ IV Cont (06.27.23 @ 16:17) >  No evidence of thoracic, abdominal or pelvic visceral injury, laceration, or hematoma.No evidence of acute fracture. troponin x 2 negative. Placed in obs for echo. Advancement Flap (Double) Text: The defect edges were debeveled with a #15 scalpel blade.  Given the location of the defect and the proximity to free margins a double advancement flap was deemed most appropriate.  Using a sterile surgical marker, the appropriate advancement flaps were drawn incorporating the defect and placing the expected incisions within the relaxed skin tension lines where possible.    The area thus outlined was incised deep to adipose tissue with a #15 scalpel blade.  The skin margins were undermined to an appropriate distance in all directions utilizing iris scissors.

## 2023-06-27 NOTE — ED CDU PROVIDER INITIAL DAY NOTE - WR ORDER NAME 1
October 7, 2019        Nica Sheikh MD  [address]  [address]  [facility] [address]             [facility] - Urology 4th Floor  [address]  [place] [address]  Phone: [phone]   Patient: Rodríguez Graves   MR Number: [id number]   YOB: 1985   Date of Visit: 10/7/2019       Dear Dr. Sheikh:    Thank you for referring Rodríguez Graves to me for evaluation. Attached you will find relevant portions of my assessment and plan of care.    If you have questions, please do not hesitate to call me. I look forward to following Rodríguez Graves along with you.    Sincerely,      Ho Spence MD            CC  No Recipients    Enclosure          Xray Chest 1 View AP/PA

## 2023-06-27 NOTE — ED PROVIDER NOTE - ST/T WAVE
no acute ischemic changes. No WPW, Brugada, HCM, QTc prolongation/shortening, ARVC as interpreted by me

## 2023-06-27 NOTE — ED PROVIDER NOTE - OBJECTIVE STATEMENT
73-year-old female, past medical history of atrial fibrillation on Eliquis, presents emergency department status post syncopal episode just prior to arrival.  It was witnessed by her  who states she most likely hit her head.  She had preceding lightheadedness, anxiety, and shortness of breath.  She has no complaints at this time.

## 2023-06-28 VITALS
RESPIRATION RATE: 18 BRPM | HEART RATE: 70 BPM | DIASTOLIC BLOOD PRESSURE: 61 MMHG | OXYGEN SATURATION: 100 % | TEMPERATURE: 97 F | SYSTOLIC BLOOD PRESSURE: 121 MMHG

## 2023-06-28 LAB — GLUCOSE BLDC GLUCOMTR-MCNC: 120 MG/DL — HIGH (ref 70–99)

## 2023-06-28 PROCEDURE — 93306 TTE W/DOPPLER COMPLETE: CPT | Mod: 26

## 2023-06-28 PROCEDURE — 99238 HOSP IP/OBS DSCHRG MGMT 30/<: CPT

## 2023-06-28 NOTE — ED CDU PROVIDER DISPOSITION NOTE - PATIENT PORTAL LINK FT
You can access the FollowMyHealth Patient Portal offered by [facility] by registering at the following website: [email or web address]. By joining [employer]’s FollowMyHealth portal, you will also be able to view your health information using other applications (apps) compatible with our system.

## 2023-06-28 NOTE — ED CDU PROVIDER DISPOSITION NOTE - PROVIDER TOKENS
FREE:[LAST:[follow up with your primary doctor],PHONE:[(   )    -],FAX:[(   )    -],FOLLOWUP:[4-6 Days]],PROVIDER:[TOKEN:[[id number]],FOLLOWUP:[4-6 Days]],FREE:[LAST:[follow up with your cardiologist],PHONE:[(   )    -],FAX:[(   )    -],FOLLOWUP:[4-6 Days]]

## 2023-06-28 NOTE — ED CDU PROVIDER DISPOSITION NOTE - CARE PROVIDER_API CALL
follow up with your primary doctor,   Phone: (   )    -  Fax: (   )    -  Follow Up Time: 4-6 Days    COLE MASON  [address] [place] [address]  Phone: ()-  Fax: ()-  Follow Up Time: 4-6 Days    follow up with your cardiologist,   Phone: (   )    -  Fax: (   )    -  Follow Up Time: 4-6 Days

## 2023-06-28 NOTE — ED CDU PROVIDER SUBSEQUENT DAY NOTE - CLINICAL SUMMARY MEDICAL DECISION MAKING FREE TEXT BOX
73-year-old female, past medical history of atrial fibrillation on Eliquis, presents emergency department status post syncopal episode just prior to arrival. In ED Trauma alert activated and PAN Scans performed which demonstrated < from: CT Head No Cont (06.27.23 @ 16:05) >No evidence of acute intracranial pathology.< from: CT Cervical Spine No Cont (06.27.23 @ 16:11) > 1.  No evidence of acute cervical spine fracture or subluxation.2.  Mild degenerative changes.< from: CT Chest w/ IV Cont (06.27.23 @ 16:17) >  No evidence of thoracic, abdominal or pelvic visceral injury, laceration, or hematoma.No evidence of acute fracture. troponin x 2 negative. Placed in obs for echo. Patient evaluated today felt better. Has a cardiologist Dr. Bueno and PCP in [facility]. Echo performed demonstrating < from: TTE Echo Complete w/ Contrast w/ Doppler (06.28.23 @ 08:04) >  Pericardium: There is no evidence of pericardial effusion. Patient ambulated in ed. Patient a spoken to in detail about results  All questions addressed.  Results of ED work up discussed and patient given a copy of the results. Patient has proper follow up. Return precautions given.

## 2023-06-28 NOTE — ED CDU PROVIDER SUBSEQUENT DAY NOTE - ATTENDING CONTRIBUTION TO CARE
I personally evaluated the patient. I reviewed the Resident’s or Physician Assistant’s note (as assigned above), and agree with the findings and plan except as documented in my note.  73-year-old female, past medical history of atrial fibrillation on Eliquis, presents emergency department status post syncopal episode just prior to arrival. In ED Trauma alert activated and PAN Scans performed which demonstrated < from: CT Head No Cont (06.27.23 @ 16:05) >No evidence of acute intracranial pathology.< from: CT Cervical Spine No Cont (06.27.23 @ 16:11) > 1.  No evidence of acute cervical spine fracture or subluxation.2.  Mild degenerative changes.< from: CT Chest w/ IV Cont (06.27.23 @ 16:17) >  No evidence of thoracic, abdominal or pelvic visceral injury, laceration, or hematoma.No evidence of acute fracture. troponin x 2 negative. Placed in obs for echo.  Patient evaluated today felt better. Has a cardiologist Dr. Bueno and PCP in [facility]

## 2023-10-23 NOTE — ED CDU PROVIDER SUBSEQUENT DAY NOTE - WR ORDER NAME 2
normal and advised him to call me back if any issues  Review in 6 months           1. Roxann Boyd received counseling on the following healthy behaviors: nutrition and exercise    2. Prior labs and health maintenance reviewed. 3.  Discussed use, benefit, and side effects of prescribed medications. Barriers to medication compliance addressed. All his questions were answered. Pt voiced understanding. Roxann Boyd will continue current medications, diet and exercise. Orders Placed This Encounter   Medications    metFORMIN (GLUCOPHAGE) 500 MG tablet     Sig: Take 1 tablet by mouth daily (with breakfast)     Dispense:  90 tablet     Refill:  0          Completed Refills               Requested Prescriptions     Signed Prescriptions Disp Refills    metFORMIN (GLUCOPHAGE) 500 MG tablet 90 tablet 0     Sig: Take 1 tablet by mouth daily (with breakfast)     4. Patient given educational materials - see patient instructions    5. Was a self-tracking handout given in paper form or via [facility]?   NO    Orders Placed This Encounter   Procedures    Hemoglobin A1C     Standing Status:   Future     Number of Occurrences:   1     Standing Expiration Date:   10/22/2024    Comprehensive Metabolic Panel     Standing Status:   Future     Number of Occurrences:   1     Standing Expiration Date:   10/22/2024    Microalbumin / Creatinine Urine Ratio     Standing Status:   Future     Number of Occurrences:   1     Standing Expiration Date:   10/22/2024    Lipid Panel     Standing Status:   Future     Number of Occurrences:   1     Standing Expiration Date:   10/22/2024     Order Specific Question:   Is Patient Fasting?/# of Hours     Answer:   no,zero    TSH     Standing Status:   Future     Number of Occurrences:   1     Standing Expiration Date:   10/22/2024    T4, Free     Standing Status:   Future     Number of Occurrences:   1     Standing Expiration Date:   10/22/2024    CBC     Standing Status:   Future     Number of
Xray Pelvis AP only

## 2023-11-09 ENCOUNTER — OUTPATIENT (OUTPATIENT)
Dept: OUTPATIENT SERVICES | Facility: HOSPITAL | Age: 73
LOS: 1 days | End: 2023-11-09
Payer: MEDICARE

## 2023-11-09 ENCOUNTER — RESULT REVIEW (OUTPATIENT)
Age: 73
End: 2023-11-09

## 2023-11-09 DIAGNOSIS — R92.2 INCONCLUSIVE MAMMOGRAM: ICD-10-CM

## 2023-11-09 DIAGNOSIS — Z12.31 ENCOUNTER FOR SCREENING MAMMOGRAM FOR MALIGNANT NEOPLASM OF BREAST: ICD-10-CM

## 2023-11-09 PROCEDURE — 77067 SCR MAMMO BI INCL CAD: CPT | Mod: 26

## 2023-11-09 PROCEDURE — 77067 SCR MAMMO BI INCL CAD: CPT

## 2023-11-09 PROCEDURE — 77063 BREAST TOMOSYNTHESIS BI: CPT

## 2023-11-09 PROCEDURE — 77063 BREAST TOMOSYNTHESIS BI: CPT | Mod: 26

## 2023-11-10 DIAGNOSIS — Z12.31 ENCOUNTER FOR SCREENING MAMMOGRAM FOR MALIGNANT NEOPLASM OF BREAST: ICD-10-CM

## 2023-11-10 NOTE — PROCEDURE NOTE - NSTOLERANCE_GEN_A_CORE
Patient called for results. [facility]. Compliance visit scheduled.   
Patient tolerated procedure well.
Patient tolerated procedure well.

## 2024-02-27 ENCOUNTER — APPOINTMENT (OUTPATIENT)
Dept: OBGYN | Facility: CLINIC | Age: 74
End: 2024-02-27
Payer: MEDICARE

## 2024-02-27 DIAGNOSIS — Z01.419 ENCOUNTER FOR GYNECOLOGICAL EXAMINATION (GENERAL) (ROUTINE) W/OUT ABNORMAL FINDINGS: ICD-10-CM

## 2024-02-27 PROCEDURE — 76830 TRANSVAGINAL US NON-OB: CPT

## 2024-02-27 PROCEDURE — G0101: CPT

## 2024-02-27 NOTE — HISTORY OF PRESENT ILLNESS
[FreeTextEntry1] : ---- 75 Y/O HERE  FOR CHECK UP.F PMHX;/ATRIAL FIB PSHX;/GB SOCIAL HX;-ETOH -CIGG  STD;   /        DISCUSSED [facility] FAMILY HISTORY OF BREAST CANCER REVIEW OF SYMPTOMS DONE; ALLERGIES; pt answered AUGMENIN Medication reconciliation was completed by reviewing, with the patient's involvement, the patient's current outpatient medications and those  ordered for the patient today.   PE;BREASTS;-MASSES DC NODES SBE ABDOMEN SOFT NT ND NL GENITALIA VAGINA -DC CX-CMT UTERUS NL SIZE NT ADNEXA NT - MASSES EXT -CCE  A;P;. CERVICAL OR VAGINAL CANCER SCREENING;PELVIC AND CLINICAL BREAST EXAMINATION -SCREENING OF [place] SMEAR;OBTAINING,PREPARING AND CONVEYANCE OF CERVICAL OR VAGINAL SMEAR TO LABORATORY -[facility]ARCUS UP TO DATE

## 2024-02-29 LAB — HPV HIGH+LOW RISK DNA PNL CVX: NOT DETECTED

## 2024-03-04 LAB — CYTOLOGY CVX/VAG DOC THIN PREP: ABNORMAL

## 2024-03-30 ENCOUNTER — RESULT REVIEW (OUTPATIENT)
Age: 74
End: 2024-03-30

## 2024-03-30 ENCOUNTER — OUTPATIENT (OUTPATIENT)
Dept: OUTPATIENT SERVICES | Facility: HOSPITAL | Age: 74
LOS: 1 days | End: 2024-03-30
Payer: MEDICARE

## 2024-03-30 DIAGNOSIS — Z12.31 ENCOUNTER FOR SCREENING MAMMOGRAM FOR MALIGNANT NEOPLASM OF BREAST: ICD-10-CM

## 2024-03-30 DIAGNOSIS — R92.2 INCONCLUSIVE MAMMOGRAM: ICD-10-CM

## 2024-03-30 PROCEDURE — 76641 ULTRASOUND BREAST COMPLETE: CPT | Mod: 50

## 2024-03-30 PROCEDURE — 76641 ULTRASOUND BREAST COMPLETE: CPT | Mod: 26,50

## 2024-03-31 DIAGNOSIS — R92.2 INCONCLUSIVE MAMMOGRAM: ICD-10-CM

## 2024-11-14 ENCOUNTER — OUTPATIENT (OUTPATIENT)
Dept: OUTPATIENT SERVICES | Facility: HOSPITAL | Age: 74
LOS: 1 days | End: 2024-11-14
Payer: MEDICARE

## 2024-11-14 ENCOUNTER — RESULT REVIEW (OUTPATIENT)
Age: 74
End: 2024-11-14

## 2024-11-14 DIAGNOSIS — Z12.31 ENCOUNTER FOR SCREENING MAMMOGRAM FOR MALIGNANT NEOPLASM OF BREAST: ICD-10-CM

## 2024-11-14 PROCEDURE — 77063 BREAST TOMOSYNTHESIS BI: CPT | Mod: 26

## 2024-11-14 PROCEDURE — 77067 SCR MAMMO BI INCL CAD: CPT | Mod: 26

## 2024-11-14 PROCEDURE — 77063 BREAST TOMOSYNTHESIS BI: CPT

## 2024-11-14 PROCEDURE — 77067 SCR MAMMO BI INCL CAD: CPT

## 2024-11-15 DIAGNOSIS — Z12.31 ENCOUNTER FOR SCREENING MAMMOGRAM FOR MALIGNANT NEOPLASM OF BREAST: ICD-10-CM

## 2025-01-23 ENCOUNTER — RESULT REVIEW (OUTPATIENT)
Age: 75
End: 2025-01-23

## 2025-01-23 ENCOUNTER — OUTPATIENT (OUTPATIENT)
Dept: OUTPATIENT SERVICES | Facility: HOSPITAL | Age: 75
LOS: 1 days | End: 2025-01-23
Payer: MEDICARE

## 2025-01-23 DIAGNOSIS — Z00.8 ENCOUNTER FOR OTHER GENERAL EXAMINATION: ICD-10-CM

## 2025-01-23 DIAGNOSIS — R10.2 PELVIC AND PERINEAL PAIN: ICD-10-CM

## 2025-01-23 PROCEDURE — 76856 US EXAM PELVIC COMPLETE: CPT

## 2025-01-23 PROCEDURE — 76856 US EXAM PELVIC COMPLETE: CPT | Mod: 26

## 2025-01-24 DIAGNOSIS — R10.2 PELVIC AND PERINEAL PAIN: ICD-10-CM

## 2025-01-28 ENCOUNTER — RESULT REVIEW (OUTPATIENT)
Age: 75
End: 2025-01-28

## 2025-01-28 ENCOUNTER — OUTPATIENT (OUTPATIENT)
Dept: OUTPATIENT SERVICES | Facility: HOSPITAL | Age: 75
LOS: 1 days | End: 2025-01-28
Payer: MEDICARE

## 2025-01-28 DIAGNOSIS — R92.2 INCONCLUSIVE MAMMOGRAM: ICD-10-CM

## 2025-01-28 PROCEDURE — 76641 ULTRASOUND BREAST COMPLETE: CPT | Mod: 26,50

## 2025-01-28 PROCEDURE — 76641 ULTRASOUND BREAST COMPLETE: CPT | Mod: 50

## 2025-01-29 DIAGNOSIS — R92.2 INCONCLUSIVE MAMMOGRAM: ICD-10-CM

## 2025-02-13 ENCOUNTER — NON-APPOINTMENT (OUTPATIENT)
Age: 75
End: 2025-02-13

## 2025-04-07 NOTE — CHART NOTE - NSCHARTNOTEFT_GEN_A_CORE
Spoke to Paulo Francisco - son  On [phone]  Updated on plan. Quality 226: Preventive Care And Screening: Tobacco Use: Screening And Cessation Intervention: Patient screened for tobacco use and is an ex/non-smoker Detail Level: Detailed